# Patient Record
Sex: FEMALE | Race: BLACK OR AFRICAN AMERICAN | NOT HISPANIC OR LATINO | URBAN - METROPOLITAN AREA
[De-identification: names, ages, dates, MRNs, and addresses within clinical notes are randomized per-mention and may not be internally consistent; named-entity substitution may affect disease eponyms.]

---

## 2017-06-23 ENCOUNTER — OUTPATIENT (OUTPATIENT)
Dept: OUTPATIENT SERVICES | Facility: HOSPITAL | Age: 62
LOS: 1 days | End: 2017-06-23
Payer: COMMERCIAL

## 2017-06-23 DIAGNOSIS — Z98.89 OTHER SPECIFIED POSTPROCEDURAL STATES: Chronic | ICD-10-CM

## 2017-06-23 PROCEDURE — G0202: CPT | Mod: 26

## 2017-06-23 PROCEDURE — 77067 SCR MAMMO BI INCL CAD: CPT

## 2017-06-26 ENCOUNTER — FORM ENCOUNTER (OUTPATIENT)
Age: 62
End: 2017-06-26

## 2017-06-27 ENCOUNTER — OUTPATIENT (OUTPATIENT)
Dept: OUTPATIENT SERVICES | Facility: HOSPITAL | Age: 62
LOS: 1 days | End: 2017-06-27
Payer: COMMERCIAL

## 2017-06-27 ENCOUNTER — APPOINTMENT (OUTPATIENT)
Dept: ORTHOPEDIC SURGERY | Facility: CLINIC | Age: 62
End: 2017-06-27

## 2017-06-27 DIAGNOSIS — Z98.89 OTHER SPECIFIED POSTPROCEDURAL STATES: Chronic | ICD-10-CM

## 2017-06-27 PROCEDURE — 73564 X-RAY EXAM KNEE 4 OR MORE: CPT

## 2017-06-27 PROCEDURE — 73564 X-RAY EXAM KNEE 4 OR MORE: CPT | Mod: 26,LT

## 2017-08-08 ENCOUNTER — RX RENEWAL (OUTPATIENT)
Age: 62
End: 2017-08-08

## 2018-06-21 ENCOUNTER — FORM ENCOUNTER (OUTPATIENT)
Age: 63
End: 2018-06-21

## 2018-06-22 ENCOUNTER — APPOINTMENT (OUTPATIENT)
Dept: ORTHOPEDIC SURGERY | Facility: CLINIC | Age: 63
End: 2018-06-22
Payer: COMMERCIAL

## 2018-06-22 ENCOUNTER — OUTPATIENT (OUTPATIENT)
Dept: OUTPATIENT SERVICES | Facility: HOSPITAL | Age: 63
LOS: 1 days | End: 2018-06-22
Payer: COMMERCIAL

## 2018-06-22 VITALS — BODY MASS INDEX: 36.65 KG/M2 | WEIGHT: 220 LBS | HEIGHT: 65 IN

## 2018-06-22 DIAGNOSIS — S80.02XA CONTUSION OF LEFT KNEE, INITIAL ENCOUNTER: ICD-10-CM

## 2018-06-22 DIAGNOSIS — M17.12 UNILATERAL PRIMARY OSTEOARTHRITIS, LEFT KNEE: ICD-10-CM

## 2018-06-22 DIAGNOSIS — Z98.89 OTHER SPECIFIED POSTPROCEDURAL STATES: Chronic | ICD-10-CM

## 2018-06-22 DIAGNOSIS — S80.12XA CONTUSION OF LEFT KNEE, INITIAL ENCOUNTER: ICD-10-CM

## 2018-06-22 PROCEDURE — 20610 DRAIN/INJ JOINT/BURSA W/O US: CPT | Mod: LT

## 2018-06-22 PROCEDURE — 72020 X-RAY EXAM OF SPINE 1 VIEW: CPT | Mod: 26

## 2018-06-22 PROCEDURE — 73564 X-RAY EXAM KNEE 4 OR MORE: CPT | Mod: 26,LT

## 2018-06-22 PROCEDURE — 73564 X-RAY EXAM KNEE 4 OR MORE: CPT

## 2018-06-22 PROCEDURE — 73521 X-RAY EXAM HIPS BI 2 VIEWS: CPT

## 2018-06-22 PROCEDURE — 99214 OFFICE O/P EST MOD 30 MIN: CPT | Mod: 25

## 2018-06-22 PROCEDURE — 72020 X-RAY EXAM OF SPINE 1 VIEW: CPT

## 2018-06-22 PROCEDURE — 73521 X-RAY EXAM HIPS BI 2 VIEWS: CPT | Mod: 26

## 2018-06-25 ENCOUNTER — APPOINTMENT (OUTPATIENT)
Dept: MAMMOGRAPHY | Facility: HOSPITAL | Age: 63
End: 2018-06-25
Payer: COMMERCIAL

## 2018-06-25 ENCOUNTER — OUTPATIENT (OUTPATIENT)
Dept: OUTPATIENT SERVICES | Facility: HOSPITAL | Age: 63
LOS: 1 days | End: 2018-06-25
Payer: COMMERCIAL

## 2018-06-25 DIAGNOSIS — Z98.89 OTHER SPECIFIED POSTPROCEDURAL STATES: Chronic | ICD-10-CM

## 2018-06-25 PROCEDURE — 77063 BREAST TOMOSYNTHESIS BI: CPT | Mod: 26

## 2018-06-25 PROCEDURE — 77067 SCR MAMMO BI INCL CAD: CPT

## 2018-06-25 PROCEDURE — 77063 BREAST TOMOSYNTHESIS BI: CPT

## 2018-06-25 PROCEDURE — 77067 SCR MAMMO BI INCL CAD: CPT | Mod: 26

## 2018-07-10 ENCOUNTER — TRANSCRIPTION ENCOUNTER (OUTPATIENT)
Age: 63
End: 2018-07-10

## 2018-07-27 ENCOUNTER — APPOINTMENT (OUTPATIENT)
Dept: INTERNAL MEDICINE | Facility: CLINIC | Age: 63
End: 2018-07-27
Payer: COMMERCIAL

## 2018-07-27 VITALS — BODY MASS INDEX: 37.11 KG/M2 | WEIGHT: 223 LBS

## 2018-07-27 PROCEDURE — 97802 MEDICAL NUTRITION INDIV IN: CPT

## 2018-08-20 ENCOUNTER — APPOINTMENT (OUTPATIENT)
Dept: INTERNAL MEDICINE | Facility: CLINIC | Age: 63
End: 2018-08-20

## 2018-09-07 ENCOUNTER — APPOINTMENT (OUTPATIENT)
Dept: INTERNAL MEDICINE | Facility: CLINIC | Age: 63
End: 2018-09-07
Payer: COMMERCIAL

## 2018-09-07 VITALS — BODY MASS INDEX: 37.11 KG/M2 | WEIGHT: 223 LBS

## 2018-09-07 PROCEDURE — 97803 MED NUTRITION INDIV SUBSEQ: CPT

## 2018-09-24 ENCOUNTER — OTHER (OUTPATIENT)
Age: 63
End: 2018-09-24

## 2018-09-25 ENCOUNTER — FORM ENCOUNTER (OUTPATIENT)
Age: 63
End: 2018-09-25

## 2018-09-26 ENCOUNTER — OUTPATIENT (OUTPATIENT)
Dept: OUTPATIENT SERVICES | Facility: HOSPITAL | Age: 63
LOS: 1 days | End: 2018-09-26
Payer: COMMERCIAL

## 2018-09-26 ENCOUNTER — APPOINTMENT (OUTPATIENT)
Dept: ORTHOPEDIC SURGERY | Facility: CLINIC | Age: 63
End: 2018-09-26
Payer: COMMERCIAL

## 2018-09-26 VITALS
HEIGHT: 65 IN | DIASTOLIC BLOOD PRESSURE: 80 MMHG | BODY MASS INDEX: 36.65 KG/M2 | WEIGHT: 220 LBS | SYSTOLIC BLOOD PRESSURE: 120 MMHG

## 2018-09-26 DIAGNOSIS — M51.36 OTHER INTERVERTEBRAL DISC DEGENERATION, LUMBAR REGION: ICD-10-CM

## 2018-09-26 DIAGNOSIS — Z98.89 OTHER SPECIFIED POSTPROCEDURAL STATES: Chronic | ICD-10-CM

## 2018-09-26 PROCEDURE — 72084 X-RAY EXAM ENTIRE SPI 6/> VW: CPT | Mod: 26

## 2018-09-26 PROCEDURE — 72082 X-RAY EXAM ENTIRE SPI 2/3 VW: CPT

## 2018-09-26 PROCEDURE — 72110 X-RAY EXAM L-2 SPINE 4/>VWS: CPT

## 2018-09-26 PROCEDURE — 72110 X-RAY EXAM L-2 SPINE 4/>VWS: CPT | Mod: 26,59

## 2018-09-26 PROCEDURE — 99215 OFFICE O/P EST HI 40 MIN: CPT

## 2018-09-26 RX ORDER — HYDROCHLOROTHIAZIDE 25 MG/1
25 TABLET ORAL
Qty: 90 | Refills: 0 | Status: DISCONTINUED | COMMUNITY
Start: 2018-03-11 | End: 2018-09-26

## 2018-09-26 RX ORDER — MELOXICAM 15 MG/1
15 TABLET ORAL
Qty: 30 | Refills: 1 | Status: DISCONTINUED | COMMUNITY
Start: 2017-06-27 | End: 2018-09-26

## 2018-10-07 ENCOUNTER — FORM ENCOUNTER (OUTPATIENT)
Age: 63
End: 2018-10-07

## 2018-10-08 ENCOUNTER — APPOINTMENT (OUTPATIENT)
Dept: MRI IMAGING | Facility: HOSPITAL | Age: 63
End: 2018-10-08
Payer: COMMERCIAL

## 2018-10-08 ENCOUNTER — OUTPATIENT (OUTPATIENT)
Dept: OUTPATIENT SERVICES | Facility: HOSPITAL | Age: 63
LOS: 1 days | End: 2018-10-08
Payer: COMMERCIAL

## 2018-10-08 DIAGNOSIS — Z98.89 OTHER SPECIFIED POSTPROCEDURAL STATES: Chronic | ICD-10-CM

## 2018-10-08 PROCEDURE — 72148 MRI LUMBAR SPINE W/O DYE: CPT

## 2018-10-08 PROCEDURE — 72148 MRI LUMBAR SPINE W/O DYE: CPT | Mod: 26

## 2018-10-12 ENCOUNTER — APPOINTMENT (OUTPATIENT)
Dept: ORTHOPEDIC SURGERY | Facility: CLINIC | Age: 63
End: 2018-10-12
Payer: COMMERCIAL

## 2018-10-12 PROCEDURE — 20610 DRAIN/INJ JOINT/BURSA W/O US: CPT | Mod: LT

## 2018-10-12 RX ORDER — CHROMIUM 200 MCG
TABLET ORAL
Refills: 0 | Status: DISCONTINUED | COMMUNITY
End: 2018-10-12

## 2018-10-12 RX ADMIN — Medication 1 MG/2ML: at 00:00

## 2018-10-19 ENCOUNTER — APPOINTMENT (OUTPATIENT)
Dept: ORTHOPEDIC SURGERY | Facility: CLINIC | Age: 63
End: 2018-10-19
Payer: COMMERCIAL

## 2018-10-19 PROCEDURE — 20610 DRAIN/INJ JOINT/BURSA W/O US: CPT | Mod: RT

## 2018-10-19 RX ADMIN — Medication 1 MG/2ML: at 00:00

## 2018-10-26 ENCOUNTER — APPOINTMENT (OUTPATIENT)
Dept: ORTHOPEDIC SURGERY | Facility: CLINIC | Age: 63
End: 2018-10-26
Payer: COMMERCIAL

## 2018-10-26 PROCEDURE — 20610 DRAIN/INJ JOINT/BURSA W/O US: CPT | Mod: LT

## 2018-10-26 RX ADMIN — Medication 1 MG/2ML: at 00:00

## 2018-10-29 ENCOUNTER — APPOINTMENT (OUTPATIENT)
Dept: ORTHOPEDIC SURGERY | Facility: HOSPITAL | Age: 63
End: 2018-10-29
Payer: COMMERCIAL

## 2018-10-29 PROCEDURE — 99214 OFFICE O/P EST MOD 30 MIN: CPT

## 2018-11-02 ENCOUNTER — APPOINTMENT (OUTPATIENT)
Dept: INTERNAL MEDICINE | Facility: CLINIC | Age: 63
End: 2018-11-02

## 2019-06-04 ENCOUNTER — TRANSCRIPTION ENCOUNTER (OUTPATIENT)
Age: 64
End: 2019-06-04

## 2019-06-19 ENCOUNTER — FORM ENCOUNTER (OUTPATIENT)
Age: 64
End: 2019-06-19

## 2019-06-20 ENCOUNTER — APPOINTMENT (OUTPATIENT)
Dept: ORTHOPEDIC SURGERY | Facility: CLINIC | Age: 64
End: 2019-06-20
Payer: MEDICAID

## 2019-06-20 ENCOUNTER — OUTPATIENT (OUTPATIENT)
Dept: OUTPATIENT SERVICES | Facility: HOSPITAL | Age: 64
LOS: 1 days | End: 2019-06-20
Payer: COMMERCIAL

## 2019-06-20 VITALS — BODY MASS INDEX: 36.65 KG/M2 | HEIGHT: 65 IN | WEIGHT: 220 LBS

## 2019-06-20 DIAGNOSIS — Z98.89 OTHER SPECIFIED POSTPROCEDURAL STATES: Chronic | ICD-10-CM

## 2019-06-20 PROCEDURE — 99213 OFFICE O/P EST LOW 20 MIN: CPT | Mod: 25

## 2019-06-20 PROCEDURE — 73564 X-RAY EXAM KNEE 4 OR MORE: CPT

## 2019-06-20 PROCEDURE — 73564 X-RAY EXAM KNEE 4 OR MORE: CPT | Mod: 26,50

## 2019-06-20 PROCEDURE — 20610 DRAIN/INJ JOINT/BURSA W/O US: CPT | Mod: RT

## 2019-06-20 RX ORDER — DEXAMETHASONE SODIUM PHOSPHATE 4 MG/ML
120 INJECTION, SOLUTION INTRA-ARTICULAR; INTRALESIONAL; INTRAMUSCULAR; INTRAVENOUS; SOFT TISSUE
Refills: 0 | Status: COMPLETED | OUTPATIENT
Start: 2019-06-20

## 2019-06-20 RX ADMIN — DEXAMETHASONE SODIUM PHOSPHATE 1 MG/30ML: 4 INJECTION, SOLUTION INTRA-ARTICULAR; INTRALESIONAL; INTRAMUSCULAR; INTRAVENOUS; SOFT TISSUE at 00:00

## 2019-06-20 NOTE — PHYSICAL EXAM
[de-identified] : Constitutional: Well appearing.  No acute distress.\par Mental Status: Alert & oriented to person, place and time. Normal affect.\par Pulmonary: No respiratory distress. Normal chest excursion.\par Abdomen: Soft and not distended.\par Gait: Slight Antalgic.\par Ambulatory assist devices: None.\par \par Cervical spine: Skin intact. No visible deformity.  Painless active ROM without evident restriction.\par Bilateral upper extremities: Skin intact. No deformity. Painless active ROM without evident restriction.\par Thoracolumbar spine: No deformity. No tenderness. No radicular pain on passive straight leg raise bilaterally.\par Pelvis: No pelvic obliquity. No tenderness.\par Leg lengths: Equal.\par \par Bilateral Hips: Well-healed surgical scars. No swelling or deformity. No focal tenderness. Painless and unrestricted range of motion.  No crepitation. Hip flexor and abductor power 5/5.\par \par Right Knee:\par Skin intact.\par No surgical scars.\par No erythema or ecchymosis.\par Moderate effusion.\par No deformity.\par Medial > lateral tenderness.\par Mildly painful ROM from full extension to 115 degrees of flexion\par Central patellar tracking.\par Pain with patellar grind.\par + crepitation.\par No instability.\par Quadriceps power 5/5. \par \par Left Knee:\par Skin intact.\par No surgical scars.\par No erythema or ecchymosis.\par Mild effusion.\par No deformity.\par Medial and lateral joint line tenderness.\par ROM from full extension to 120 degrees of flexion, pain with terminal flexion.\par Central patellar tracking.\par Pain with patellar grind.\par + crepitation.\par No instability.\par Quadriceps power 5/5. \par  \par Neurological: Intact distal crude touch sensation. Normal distal motor power. Symmetric knee jerk and ankle jerk reflexes bilaterally.\par Cardiovascular: Palpable dorsalis pedis and posterior tibialis pulses. Brisk capillary refill. No peripheral edema.\par Lymphatics: No peripheral adenopathy appreciated. \par  [de-identified] : X-rays taken today demonstrate bilateral knees with severe joint space narrowing with subchondral sclerosis and osteophyte formation, right worse than left dyspnea

## 2019-06-20 NOTE — HISTORY OF PRESENT ILLNESS
[de-identified] : Patient presents for follow up of bilateral knee pain. States last HA injections provided about 4 months of relief and she would like repeat injections. Her pain is worse when walking long distances and going up and downstairs. She is taking Advil with mild relief and finds this to be more effective than Meloxicam.

## 2019-06-20 NOTE — PROCEDURE
[Injection] : Injection [Bilateral] : of bilateral [Knee Joint] : knee joint [Osteoarthritis] : Osteoarthritis [Joint Pain] : Joint pain [Patient] : patient [Risk] : risk [Benefits] : benefits [Alternatives] : alternatives [Bleeding] : bleeding [Allergic Reaction] : allergic reaction [Infection] : infection [Verbal Consent Obtained] : verbal consent was obtained prior to the procedure [Alcohol] : Alcohol [Betadine] : Betadine [Ethyl Chloride Spray] : ethyl chloride spray was used as a topical anesthetic [Lateral] : lateral [20] : a 20-gauge [1.5  inch] : 1.5 inch needle [1% Lidocaine___(mL)] : [unfilled] mL of 1% Lidocaine [Dexameth. 4mg/mL___(mL)] : [unfilled] ~U mL of 4 mg/mL dexamethasone [Tolerated Well] : The patient tolerated the procedure well [Bandage Applied] : a bandage [No Strenuous Activity___day(s)] : avoid strenuous activity for [unfilled] day(s) [None] : none [PRN] : as needed

## 2019-06-20 NOTE — ADDENDUM
[FreeTextEntry1] : The patient’s case was personally discussed with Dr. Raygoza who was in agreement with the assessment and plan\par \par

## 2019-06-20 NOTE — DISCUSSION/SUMMARY
[de-identified] : Diagnosis, prognosis and treatment options discussed. Conservative management including activity modification, therapeutic exercise with PT, topical and oral antiinflammatories, steroid and HA injections discussed. Patient understands the nature and progression of arthritis and that they will likely require knee replacement in the future. Will request HA authorization for HA. Patient was given bilateral steroid injections today for pain relief while awaiting HA approval. Follow up for injections.

## 2019-07-02 ENCOUNTER — APPOINTMENT (OUTPATIENT)
Dept: MAMMOGRAPHY | Facility: HOSPITAL | Age: 64
End: 2019-07-02
Payer: MEDICAID

## 2019-07-17 ENCOUNTER — OUTPATIENT (OUTPATIENT)
Dept: OUTPATIENT SERVICES | Facility: HOSPITAL | Age: 64
LOS: 1 days | End: 2019-07-17
Payer: COMMERCIAL

## 2019-07-17 ENCOUNTER — APPOINTMENT (OUTPATIENT)
Dept: MAMMOGRAPHY | Facility: HOSPITAL | Age: 64
End: 2019-07-17
Payer: MEDICAID

## 2019-07-17 DIAGNOSIS — Z98.89 OTHER SPECIFIED POSTPROCEDURAL STATES: Chronic | ICD-10-CM

## 2019-07-17 PROCEDURE — 77067 SCR MAMMO BI INCL CAD: CPT | Mod: 26

## 2019-07-17 PROCEDURE — 77063 BREAST TOMOSYNTHESIS BI: CPT | Mod: 26

## 2019-07-17 PROCEDURE — 77067 SCR MAMMO BI INCL CAD: CPT

## 2019-07-17 PROCEDURE — 77063 BREAST TOMOSYNTHESIS BI: CPT

## 2019-07-31 ENCOUNTER — FORM ENCOUNTER (OUTPATIENT)
Age: 64
End: 2019-07-31

## 2019-08-01 ENCOUNTER — APPOINTMENT (OUTPATIENT)
Dept: ORTHOPEDIC SURGERY | Facility: CLINIC | Age: 64
End: 2019-08-01
Payer: MEDICAID

## 2019-08-01 ENCOUNTER — OUTPATIENT (OUTPATIENT)
Dept: OUTPATIENT SERVICES | Facility: HOSPITAL | Age: 64
LOS: 1 days | End: 2019-08-01
Payer: COMMERCIAL

## 2019-08-01 VITALS — WEIGHT: 220 LBS | HEIGHT: 65 IN | BODY MASS INDEX: 36.65 KG/M2

## 2019-08-01 DIAGNOSIS — Z98.89 OTHER SPECIFIED POSTPROCEDURAL STATES: Chronic | ICD-10-CM

## 2019-08-01 PROCEDURE — 99214 OFFICE O/P EST MOD 30 MIN: CPT

## 2019-08-01 PROCEDURE — 73521 X-RAY EXAM HIPS BI 2 VIEWS: CPT | Mod: 26

## 2019-08-01 PROCEDURE — 73521 X-RAY EXAM HIPS BI 2 VIEWS: CPT

## 2019-08-01 NOTE — DISCUSSION/SUMMARY
[de-identified] : Ms. VALERO  has progressively severe hip pain and disability secondary to DJD and has failed extensive conservative care including activity modification, analgesic medications and therapeutic exercise. The patient was indicated for left total hip replacement.\par \par We discussed the details of the procedure, the expected recovery period, and the expected outcome. Bearing surface and fixation options were discussed, along with surgical approaches. For this patient I recommended a posterior approach to ensure adequate surgical exposure.\par \par We discussed the likelihood of satisfaction after complete recovery, and the potential causes of dissatisfaction. Specific risks of total hip replacement were discussed in detail. We discussed the risk of surgical site complications including but not limited to: surgical site infection, wound healing complications, bone fracture, prosthetic hip dislocation, neurovascular injury, hemorrhage, limb length inequality, persistent pain and need for reoperation. We discussed surgical blood loss and the possible need for blood transfusion. We discussed the risk of perioperative medical complications, including but not limited to catheter-associated urinary tract infection, venous thromboembolism and other cardiopulmonary complications. We discussed anesthetic options and the risk of anesthesia-related complications. We discussed the durability of prosthetic hips and limitations related to wear, osteolysis and loosening. The patient was given a copy of my preoperative packet with additional information about the procedure.\par \par I advised the patient to attempt to be the most lean, limber, strong and coordinated version of herself prior to the operation. I encouraged her to lose weight and referred her to a nutritionist program.\par \par The patient gave informed consent for the surgical procedure and was instructed to speak to my surgical coordinator to arrange the logistics of surgical scheduling, presurgical testing, and medical optimization and clearance.

## 2019-08-01 NOTE — PHYSICAL EXAM
[de-identified] : Well appearing. NAD. Alert and oriented x 3. No respiratory distress.\par Bilateral upper extremities: Skin intact. No deformity. Painless active ROM without evident restriction.\par Cervical, thoracic and lumbar spine: No visible deformity. Painless active ROM without evident restriction. No radicular pain on passive straight leg raise bilaterally.\par \par Pelvis: No pelvic obliquity. No tenderness.\par Leg lengths: Similar. Left ~2 mm shorter than right.\par \par Gait: Left coxalgic.\par Ambulatory assist devices: None.\par \par Right Hip:\par Skin intact. (+) Well healed posterior surgical scar. No erythema. No ecchymosis. No swelling. No deformity. No focal tenderness.\par Painless ROM from full extension to 115 degrees of flexion. 5 degrees of internal rotation. 55 degrees of external rotation. 60 degrees of abduction. 10 degrees of adduction.\par JOSE painless. Impingement (FADIR) painless. Stinchfield painless.\par No crepitation. No instability. Abductor power 5/5. \par \par Left Hip:\par Skin intact. No surgical scars. No erythema. No ecchymosis. No swelling. No deformity. No focal tenderness.\par Painful ROM from full extension to 90 degrees of flexion. 15 degrees of obligate external rotation. 40 degrees of external rotation. 25 degrees of abduction. 0 degrees of adduction.\par JOSE painful. Impingement (FADIR) painful. Stinchfield painful.\par No crepitation. No instability.\par Abductor power 5/5. Flexor power 4-/5.\par \par Bilateral Knees: Skin intact. No surgical scars. No erythema or ecchymosis. No swelling or effusion. No deformity. No focal tenderness. Central patellar tracking. (+) crepitation. No instability. ROM from <5 degree flexion contracture to 115 degrees of flexion.\par \par Neurological: Intact distal crude touch sensation. Normal distal motor power. \par Cardiovascular: Lower extremities warm and well perfused. No peripheral edema. [de-identified] : X-ray imaging of the AP pelvis and bilateral hips done here today demonstrates right hip with well-fixed right THR in overall good alignment, severe osteoarthritis of left hip, bone on bone with subchondral sclerosis, cysts and osteophyte formation\par

## 2019-08-01 NOTE — HISTORY OF PRESENT ILLNESS
[de-identified] : 63 year old F presents today for follow up evaluation of left hip pain. She reports moderate, daily left hip pain localized to the side of the hip and the buttock that occurs upon rising in the morning. She also reports some bilateral knee pain but the hip is more severe and disabling. Patient can walk less than 5 blocks with a slight limp. She has difficulty using stairs. \par Ms. Lai is s/p right THR 10/25/16 and is doing well overall.

## 2019-08-01 NOTE — END OF VISIT
[>50% of Time Spent on Counseling for ____] : Greater than 50% of the encounter time was spent on counseling for [unfilled] [FreeTextEntry3] : All medical record entries made by Michelle Espinoza acting as a scribe for the performing provider (Al Raygoza MD and/or KRISTEN De La Torre) on 08/01/2019. All entries were dictated to me by the performing medical provider. In signing this record, the medical provider affirms that they have personally performed the history, physical exam, assessment and plan and have also directed, reviewed and agreed to the documentation in the chart. [Time Spent: ___ minutes] : I have spent [unfilled] minutes of face to face time with the patient

## 2019-08-14 ENCOUNTER — OTHER (OUTPATIENT)
Age: 64
End: 2019-08-14

## 2019-08-19 ENCOUNTER — FORM ENCOUNTER (OUTPATIENT)
Age: 64
End: 2019-08-19

## 2019-08-20 ENCOUNTER — OUTPATIENT (OUTPATIENT)
Dept: OUTPATIENT SERVICES | Facility: HOSPITAL | Age: 64
LOS: 1 days | End: 2019-08-20
Payer: COMMERCIAL

## 2019-08-20 ENCOUNTER — APPOINTMENT (OUTPATIENT)
Dept: ORTHOPEDIC SURGERY | Facility: CLINIC | Age: 64
End: 2019-08-20
Payer: MEDICAID

## 2019-08-20 DIAGNOSIS — Z98.89 OTHER SPECIFIED POSTPROCEDURAL STATES: Chronic | ICD-10-CM

## 2019-08-20 PROCEDURE — 99215 OFFICE O/P EST HI 40 MIN: CPT

## 2019-08-20 PROCEDURE — 72110 X-RAY EXAM L-2 SPINE 4/>VWS: CPT

## 2019-08-20 PROCEDURE — 72110 X-RAY EXAM L-2 SPINE 4/>VWS: CPT | Mod: 26

## 2019-08-25 ENCOUNTER — FORM ENCOUNTER (OUTPATIENT)
Age: 64
End: 2019-08-25

## 2019-08-26 ENCOUNTER — APPOINTMENT (OUTPATIENT)
Dept: CT IMAGING | Facility: HOSPITAL | Age: 64
End: 2019-08-26
Payer: MEDICAID

## 2019-08-26 ENCOUNTER — OUTPATIENT (OUTPATIENT)
Dept: OUTPATIENT SERVICES | Facility: HOSPITAL | Age: 64
LOS: 1 days | End: 2019-08-26
Payer: MEDICAID

## 2019-08-26 ENCOUNTER — OUTPATIENT (OUTPATIENT)
Dept: OUTPATIENT SERVICES | Facility: HOSPITAL | Age: 64
LOS: 1 days | End: 2019-08-26

## 2019-08-26 DIAGNOSIS — Z98.89 OTHER SPECIFIED POSTPROCEDURAL STATES: Chronic | ICD-10-CM

## 2019-08-26 DIAGNOSIS — Z01.818 ENCOUNTER FOR OTHER PREPROCEDURAL EXAMINATION: ICD-10-CM

## 2019-08-26 DIAGNOSIS — Z22.321 CARRIER OR SUSPECTED CARRIER OF METHICILLIN SUSCEPTIBLE STAPHYLOCOCCUS AUREUS: ICD-10-CM

## 2019-08-26 LAB
ANION GAP SERPL CALC-SCNC: 10 MMOL/L — SIGNIFICANT CHANGE UP (ref 5–17)
APPEARANCE UR: CLEAR — SIGNIFICANT CHANGE UP
APTT BLD: 37.8 SEC — HIGH (ref 27.5–36.3)
BILIRUB UR-MCNC: NEGATIVE — SIGNIFICANT CHANGE UP
BUN SERPL-MCNC: 15 MG/DL — SIGNIFICANT CHANGE UP (ref 7–23)
CALCIUM SERPL-MCNC: 10 MG/DL — SIGNIFICANT CHANGE UP (ref 8.4–10.5)
CHLORIDE SERPL-SCNC: 105 MMOL/L — SIGNIFICANT CHANGE UP (ref 96–108)
CO2 SERPL-SCNC: 27 MMOL/L — SIGNIFICANT CHANGE UP (ref 22–31)
COLOR SPEC: YELLOW — SIGNIFICANT CHANGE UP
CREAT SERPL-MCNC: 0.65 MG/DL — SIGNIFICANT CHANGE UP (ref 0.5–1.3)
DIFF PNL FLD: NEGATIVE — SIGNIFICANT CHANGE UP
GLUCOSE SERPL-MCNC: 86 MG/DL — SIGNIFICANT CHANGE UP (ref 70–99)
GLUCOSE UR QL: NEGATIVE — SIGNIFICANT CHANGE UP
HBA1C BLD-MCNC: 5.5 % — SIGNIFICANT CHANGE UP (ref 4–5.6)
HCT VFR BLD CALC: 40.3 % — SIGNIFICANT CHANGE UP (ref 34.5–45)
HGB BLD-MCNC: 12.7 G/DL — SIGNIFICANT CHANGE UP (ref 11.5–15.5)
INR BLD: 1.12 — SIGNIFICANT CHANGE UP (ref 0.88–1.16)
KETONES UR-MCNC: ABNORMAL MG/DL
LEUKOCYTE ESTERASE UR-ACNC: NEGATIVE — SIGNIFICANT CHANGE UP
MCHC RBC-ENTMCNC: 30.2 PG — SIGNIFICANT CHANGE UP (ref 27–34)
MCHC RBC-ENTMCNC: 31.5 GM/DL — LOW (ref 32–36)
MCV RBC AUTO: 95.7 FL — SIGNIFICANT CHANGE UP (ref 80–100)
MRSA PCR RESULT.: NEGATIVE — SIGNIFICANT CHANGE UP
NITRITE UR-MCNC: NEGATIVE — SIGNIFICANT CHANGE UP
NRBC # BLD: 0 /100 WBCS — SIGNIFICANT CHANGE UP (ref 0–0)
PH UR: 5.5 — SIGNIFICANT CHANGE UP (ref 5–8)
PLATELET # BLD AUTO: 244 K/UL — SIGNIFICANT CHANGE UP (ref 150–400)
POTASSIUM SERPL-MCNC: 4.2 MMOL/L — SIGNIFICANT CHANGE UP (ref 3.5–5.3)
POTASSIUM SERPL-SCNC: 4.2 MMOL/L — SIGNIFICANT CHANGE UP (ref 3.5–5.3)
PROT UR-MCNC: NEGATIVE MG/DL — SIGNIFICANT CHANGE UP
PROTHROM AB SERPL-ACNC: 12.7 SEC — SIGNIFICANT CHANGE UP (ref 10–12.9)
RBC # BLD: 4.21 M/UL — SIGNIFICANT CHANGE UP (ref 3.8–5.2)
RBC # FLD: 14.4 % — SIGNIFICANT CHANGE UP (ref 10.3–14.5)
S AUREUS DNA NOSE QL NAA+PROBE: NEGATIVE — SIGNIFICANT CHANGE UP
SODIUM SERPL-SCNC: 142 MMOL/L — SIGNIFICANT CHANGE UP (ref 135–145)
SP GR SPEC: >=1.03 — SIGNIFICANT CHANGE UP (ref 1–1.03)
UROBILINOGEN FLD QL: 0.2 E.U./DL — SIGNIFICANT CHANGE UP
WBC # BLD: 6.47 K/UL — SIGNIFICANT CHANGE UP (ref 3.8–10.5)
WBC # FLD AUTO: 6.47 K/UL — SIGNIFICANT CHANGE UP (ref 3.8–10.5)

## 2019-08-26 PROCEDURE — 71046 X-RAY EXAM CHEST 2 VIEWS: CPT | Mod: 26

## 2019-08-26 PROCEDURE — 71046 X-RAY EXAM CHEST 2 VIEWS: CPT

## 2019-08-26 PROCEDURE — 85610 PROTHROMBIN TIME: CPT

## 2019-08-26 PROCEDURE — 73700 CT LOWER EXTREMITY W/O DYE: CPT | Mod: 26,LT

## 2019-08-26 PROCEDURE — 87086 URINE CULTURE/COLONY COUNT: CPT

## 2019-08-26 PROCEDURE — 93005 ELECTROCARDIOGRAM TRACING: CPT

## 2019-08-26 PROCEDURE — 80048 BASIC METABOLIC PNL TOTAL CA: CPT

## 2019-08-26 PROCEDURE — 85027 COMPLETE CBC AUTOMATED: CPT

## 2019-08-26 PROCEDURE — 73700 CT LOWER EXTREMITY W/O DYE: CPT

## 2019-08-26 PROCEDURE — 85730 THROMBOPLASTIN TIME PARTIAL: CPT

## 2019-08-26 PROCEDURE — 93010 ELECTROCARDIOGRAM REPORT: CPT

## 2019-08-26 PROCEDURE — 81003 URINALYSIS AUTO W/O SCOPE: CPT

## 2019-08-26 PROCEDURE — 83036 HEMOGLOBIN GLYCOSYLATED A1C: CPT

## 2019-08-28 LAB
CULTURE RESULTS: SIGNIFICANT CHANGE UP
SPECIMEN SOURCE: SIGNIFICANT CHANGE UP

## 2019-08-29 LAB
ANABASINE UR-MCNC: <2 NG/ML — SIGNIFICANT CHANGE UP
COTININE UR-MCNC: <5 NG/ML — SIGNIFICANT CHANGE UP
NICOTINE UR-MCNC: <5 NG/ML — SIGNIFICANT CHANGE UP
NORNICOTINE UR-MCNC: <2 NG/ML — SIGNIFICANT CHANGE UP

## 2019-09-01 NOTE — PHYSICAL EXAM
[de-identified] : Spine: Spine: General: patient is well developed, well nourished, in no acute \par distress, alert and oriented x 3. \par \par Mood and affect: normal\par \par Respiratory: no respiratory distress noted\par \par Skin: no scars over spine, skin intact, no erythema, increased warmth\par \par Alignment:The spine is well compensated in the coronal and sagittal plane. There is no asymmetry on the perry forward bend test\par \par Gait: The patient is able to toe walk and heel walk without difficulty. The patient is able to tandem gait without difficulty.\par \par Palpation: no tenderness to palpation spine or paraspinal region\par \par Range of motion: Lumbar spine ROM is restricted\par \par Neurologic Exam:\par Motor: Manual Muscle testing in the lower extremities is 5 out of 5 in all muscle groups. There is no evidence of muscular atrophy in the lower extremities. Sensory: Sensation to light touch is grossly intact in the lower extremities\par \par Reflexes: DTR are present and symmetric throughout, negative lugo bilaterally, negative inverted radial reflex bilaterally, no clonus, plantar responses are flexor\par \par Hip Exam: + mild pain with IR of left hip, No pain with internal or external rotation of right hip\par \par Special tests: Straight leg raise test negative. Cross straight leg test negative. JOSE test negative\par \par Vascular: Examination of the peripheral vascular system demonstrates no evidence of congestion or edema. no evidence of lymphedema bilateral lower extremities, pulses are present and symmetric in both lower extremities.\par   [de-identified] : XR thoracolumbar 9/26/18: 9mm L4/5 anterolisthesis without instability on flex/ext; multilevel moderate degenerative changes; moderate osteoarthritis left hip; no acute fractures. \par \par MRI lumbar 10/8/18: L4/5 moderate/severe central canal stenosis, moderate/severe bilateral foraminal stenosis, L4 on L5 anterolisthesis; multilevel degenerative changes with mild central canal stenosis, mild/moderate bilateral foraminal stenosis

## 2019-09-01 NOTE — HISTORY OF PRESENT ILLNESS
[de-identified] : Follow Up 8/20/19: Patient continues to have low back and buttock pain. Denies new neurologic symptoms. Has seen Dr. Goetz for pain management. Has left THR scheduled with Dr. Raygoza 9/23/19.\par \par Follow Up 10/29/18: Patient continues to have right sided low back pain that radiates to her right buttock. Pain is mild/moderate in severity and unchanged since last visit 1 month ago. Denies new neurologic symptoms. Denies bowel/bladder symptoms. Here to discuss MRI lumbar. \par \par Initial 9/26/18: Ms. VALERO is a very pleasant 62 year old female who complains of right sided low back pain for 1 year, atraumatic. On initial presentation symptoms were as follows: Patient described the pain as intermittent. Patient rated the pain as 2/10 today, average 2/10 this week, 5/10 at its worst. The pain localized to the right side of the lower back and radiates to her right buttock. Patient denies extremity numbness and paresthesias. The pain is relieved by lying down and sitting. The pain is worsened by activity. Patient currently taking ibuprofen and tylenol as needed, with moderate temporary relief. The patient has not had physical therapy for her low back or steroid injections. Patient had right JOVI with Dr. Raygoza 10/2016 with complete relief of right hip pain at at that time. Low back and buttock pain onset about 1 year after JOVI.\par \par The patient reports no loss of hand dexterity.\par The patient states there no loss of balance when walking.\par There no sensory loss in the arms or legs\par The patent no difficulty with urination.\par \par The patient no history of previous spine surgery.\par The patient no previous spine consultation.\par \par Pain:\par Back 50 Right Leg 50 Left Leg 0\par \par The patient has no history of unexpected weight loss, no history of active cancer, no history bladder or bowel dysfunction, no night pain, no fevers or chills.\par \par The past medical history, surgical history, family history, allergies, medications, review of systems, family history and social history were reviewed and non contributory.

## 2019-09-01 NOTE — DISCUSSION/SUMMARY
[de-identified] : Discussed the results of the patient's history, physical exam, and imaging. Ms. Lai continues to suffer from low back and right buttock pain. She is planning a left THR with Dr. Raygoza 9/23/19. Neurologically intact on exam. MRI lumbar shows L4/5 severe central canal stenosis and moderate/severe bilateral foraminal stenosis. She also has an unstable 9mm L4 on L5 anterolisthesis. This likely explains her low back and buttock pain. Discussed this may require surgical intervention in the future. I am recommending an updated XR lumbar 4 view and MRI lumbar spine without contrast. She will complete MRI lumbar and then follow up with Dr. Goetz with new imaging for consideration of LISETTE. Can proceed with left THR, consult with Dr. Raygoza if scheduling LISETTE around the time of scheduled THR.  The patient will follow up with me 12/2019, sooner if there is an issue.  All questions were answered.\par \par \par \par \par

## 2019-09-20 VITALS
WEIGHT: 225.09 LBS | TEMPERATURE: 97 F | SYSTOLIC BLOOD PRESSURE: 99 MMHG | HEART RATE: 98 BPM | OXYGEN SATURATION: 99 % | RESPIRATION RATE: 18 BRPM | DIASTOLIC BLOOD PRESSURE: 65 MMHG | HEIGHT: 64 IN

## 2019-09-20 NOTE — PRE-OP CHECKLIST - BOWEL PREP
[address] [id number]   [facility] [address]     DISCHARGE SUMMARY     PATIENT: Margarito Pemberton     MRN: [id number]   ADMIT DATE: 2017   [facility]   DISCHARGE DATE:      ATTENDING: Ashley Stout MD   DICTATING: RYNE Stein         ADMISSION DIAGNOSIS: OSTEOARTHRITIS LEFT KNEE  Rheumatoid arthritis involving left knee ([facility] [address] [address]    DISCHARGE DIAGNOSIS: Status post LEFT TOTAL KNEE ARTHROPLASTY    HISTORY OF PRESENT ILLNESS: The patient is a 36y.o. year-old female   with ongoing left knee pain secondary to rheumatoid arthritis of her left knee. The patient's pain has persisted and progressed despite conservative treatments and therapies. The patient has at this time opted for surgical intervention. PAST MEDICAL HISTORY:   Past Medical History:   Diagnosis Date    Arthritis 2005    Rhematoid arthritis    Autoimmune disease ([facility] [address]     RA    Chronic pain     RA    Osteoarthritis of left knee 2017    Rheumatoid arthritis involving left knee ([facility] 2017       PAST SURGICAL HISTORY:   Past Surgical History:   Procedure Laterality Date    HX GYN      BTL       ALLERGIES: No Known Allergies     CURRENT MEDICATIONS:  A list of medications prior to the time of admission include:  Prior to Admission medications    Medication Sig Start Date End Date Taking? Authorizing Provider   acetaminophen (TYLENOL ARTHRITIS PAIN) 650 mg CR tablet Take 650 mg by mouth every six (6) hours as needed for Pain (low to moderate pain). Yes Historical Provider   aspirin (ASPIRIN) 325 mg tablet Take 1 Tab by mouth two (2) times a day for 21 days. 17 Yes RYNE Stein   meloxicam (MOBIC) 7.5 mg tablet Take 1 Tab by mouth two (2) times a day for 14 days. 17 Yes RYNE Stein   oxyCODONE-acetaminophen (PERCOCET) 5-325 mg per tablet Take 1-2 tablets by mouth every 4-6 hours as needed for pain.  17  Yes RYNE Stein   predniSONE (DELTASONE) 5 mg tablet Take 5 mg by mouth daily as needed. Yes Historical Provider   tofacitinib (XELJANZ XR) 11 mg Tb24 Take 11 mg by mouth nightly. Indications: RHEUMATOID ARTHRITIS   Yes Historical Provider   OTHER     Historical Provider       FAMILY HISTORY: History reviewed. No pertinent family history. SOCIAL HISTORY:   Social History     Social History    Marital status: SINGLE     Spouse name: N/A    Number of children: N/A    Years of education: N/A     Social History Main Topics    Smoking status: Never Smoker    Smokeless tobacco: Never Used    Alcohol use 0.6 oz/week     1 Shots of liquor per week    Drug use: No    Sexual activity: Not Asked     Other Topics Concern    None     Social History Narrative       REVIEW OF SYSTEMS: All review of systems are negative. PHYSICAL EXAMINATION: For a detailed physical exam, please refer to the patient's chart. HOSPITAL COURSE: The patient was taken to surgery the day of admission. she underwent a left total knee replacement. Operative course was benign. Estimated blood loss was approximately 150 cc. The patient was taken to the PACU in stable condition and was later taken to the floor in stable condition. During her hospital stay, the patient progressed well with physical therapy and occupational therapy, adherent to instructions. she had been cleared by physical therapy with stair training. she was placed on Aspirin for DVT prophylaxis. her pain has been well controlled with oral pain medications. her vitals have remained stable. she has also remained hemodynamically stable. The patient has been recommended for discharge home. DISCHARGE INSTRUCTIONS: The patient is to be discharged home. she is to continue on her prior medications per the medication reconciliation form, to which we will add:  1. Aspirin 325 mg; 1 tablet po bid x 21 days  2. Mobic 7.5 mg; 1 tablet po bid x 14 days  3.  Percocet 5/325 mg; 1-2 tablets p.o. every 4 to 6 hours p.r.n. for pain    The patient is to continue at home with home physical therapy 3 times a week to work on gait training, range of motion, strengthening, and weightbearing exercises as tolerated on the left lower extremity. The patient is to progress from a walker to a cane to complete total weightbearing as tolerable. The patient is to continue to keep her incision dry. The patient is to followup with Dr. Hussain Mendoza, Mary Jacques PA-C and/or Oliver Horn PA-C in the office approximately 10-14 days status post for x-rays and further evaluation.     Jovana Bower [id number], [id number]  07/07/17  7:19 AM n/a

## 2019-09-20 NOTE — PRE-OP CHECKLIST - 2.
ERAS- Gatorade done. Meds - Eemend, Celebrex and Tylenol given ERAS- Gatorade done. Meds - Emend, Celebrex and Tylenol given

## 2019-09-23 ENCOUNTER — INPATIENT (INPATIENT)
Facility: HOSPITAL | Age: 64
LOS: 2 days | Discharge: HOME HEALTH | DRG: 470 | End: 2019-09-26
Attending: ORTHOPAEDIC SURGERY | Admitting: ORTHOPAEDIC SURGERY
Payer: COMMERCIAL

## 2019-09-23 ENCOUNTER — APPOINTMENT (OUTPATIENT)
Dept: ORTHOPEDIC SURGERY | Facility: HOSPITAL | Age: 64
End: 2019-09-23

## 2019-09-23 ENCOUNTER — RESULT REVIEW (OUTPATIENT)
Age: 64
End: 2019-09-23

## 2019-09-23 DIAGNOSIS — M16.12 UNILATERAL PRIMARY OSTEOARTHRITIS, LEFT HIP: ICD-10-CM

## 2019-09-23 DIAGNOSIS — E78.5 HYPERLIPIDEMIA, UNSPECIFIED: ICD-10-CM

## 2019-09-23 DIAGNOSIS — Z98.89 OTHER SPECIFIED POSTPROCEDURAL STATES: Chronic | ICD-10-CM

## 2019-09-23 DIAGNOSIS — I10 ESSENTIAL (PRIMARY) HYPERTENSION: ICD-10-CM

## 2019-09-23 DIAGNOSIS — J45.909 UNSPECIFIED ASTHMA, UNCOMPLICATED: ICD-10-CM

## 2019-09-23 DIAGNOSIS — Z96.641 PRESENCE OF RIGHT ARTIFICIAL HIP JOINT: Chronic | ICD-10-CM

## 2019-09-23 LAB
APPEARANCE UR: CLEAR — SIGNIFICANT CHANGE UP
BACTERIA # UR AUTO: ABNORMAL /HPF
BILIRUB UR-MCNC: NEGATIVE — SIGNIFICANT CHANGE UP
COLOR SPEC: YELLOW — SIGNIFICANT CHANGE UP
DIFF PNL FLD: NEGATIVE — SIGNIFICANT CHANGE UP
EPI CELLS # UR: ABNORMAL /HPF (ref 0–5)
GLUCOSE UR QL: NEGATIVE — SIGNIFICANT CHANGE UP
KETONES UR-MCNC: NEGATIVE — SIGNIFICANT CHANGE UP
LEUKOCYTE ESTERASE UR-ACNC: ABNORMAL
NITRITE UR-MCNC: NEGATIVE — SIGNIFICANT CHANGE UP
PH UR: 8 — SIGNIFICANT CHANGE UP (ref 5–8)
PROT UR-MCNC: NEGATIVE MG/DL — SIGNIFICANT CHANGE UP
RBC CASTS # UR COMP ASSIST: < 5 /HPF — SIGNIFICANT CHANGE UP
SP GR SPEC: 1.01 — SIGNIFICANT CHANGE UP (ref 1–1.03)
UROBILINOGEN FLD QL: 0.2 E.U./DL — SIGNIFICANT CHANGE UP
WBC UR QL: ABNORMAL /HPF

## 2019-09-23 PROCEDURE — 20985 CPTR-ASST DIR MS PX: CPT

## 2019-09-23 PROCEDURE — 27130 TOTAL HIP ARTHROPLASTY: CPT | Mod: LT

## 2019-09-23 PROCEDURE — 72170 X-RAY EXAM OF PELVIS: CPT | Mod: 26

## 2019-09-23 RX ORDER — APREPITANT 80 MG/1
40 CAPSULE ORAL ONCE
Refills: 0 | Status: COMPLETED | OUTPATIENT
Start: 2019-09-23 | End: 2019-09-23

## 2019-09-23 RX ORDER — VANCOMYCIN HCL 1 G
1500 VIAL (EA) INTRAVENOUS ONCE
Refills: 0 | Status: COMPLETED | OUTPATIENT
Start: 2019-09-23 | End: 2019-09-23

## 2019-09-23 RX ORDER — SENNA PLUS 8.6 MG/1
2 TABLET ORAL AT BEDTIME
Refills: 0 | Status: DISCONTINUED | OUTPATIENT
Start: 2019-09-23 | End: 2019-09-26

## 2019-09-23 RX ORDER — CHLORHEXIDINE GLUCONATE 213 G/1000ML
1 SOLUTION TOPICAL ONCE
Refills: 0 | Status: COMPLETED | OUTPATIENT
Start: 2019-09-23 | End: 2019-09-23

## 2019-09-23 RX ORDER — OXYCODONE HYDROCHLORIDE 5 MG/1
10 TABLET ORAL EVERY 4 HOURS
Refills: 0 | Status: DISCONTINUED | OUTPATIENT
Start: 2019-09-23 | End: 2019-09-26

## 2019-09-23 RX ORDER — MORPHINE SULFATE 50 MG/1
4 CAPSULE, EXTENDED RELEASE ORAL
Refills: 0 | Status: DISCONTINUED | OUTPATIENT
Start: 2019-09-23 | End: 2019-09-26

## 2019-09-23 RX ORDER — PANTOPRAZOLE SODIUM 20 MG/1
40 TABLET, DELAYED RELEASE ORAL
Refills: 0 | Status: DISCONTINUED | OUTPATIENT
Start: 2019-09-23 | End: 2019-09-26

## 2019-09-23 RX ORDER — OXYCODONE HYDROCHLORIDE 5 MG/1
5 TABLET ORAL EVERY 4 HOURS
Refills: 0 | Status: DISCONTINUED | OUTPATIENT
Start: 2019-09-23 | End: 2019-09-26

## 2019-09-23 RX ORDER — SODIUM CHLORIDE 9 MG/ML
1000 INJECTION, SOLUTION INTRAVENOUS
Refills: 0 | Status: DISCONTINUED | OUTPATIENT
Start: 2019-09-23 | End: 2019-09-26

## 2019-09-23 RX ORDER — ACETAMINOPHEN 500 MG
1000 TABLET ORAL ONCE
Refills: 0 | Status: COMPLETED | OUTPATIENT
Start: 2019-09-23 | End: 2019-09-23

## 2019-09-23 RX ORDER — POLYETHYLENE GLYCOL 3350 17 G/17G
17 POWDER, FOR SOLUTION ORAL DAILY
Refills: 0 | Status: DISCONTINUED | OUTPATIENT
Start: 2019-09-23 | End: 2019-09-26

## 2019-09-23 RX ORDER — MORPHINE SULFATE 50 MG/1
4 CAPSULE, EXTENDED RELEASE ORAL EVERY 4 HOURS
Refills: 0 | Status: DISCONTINUED | OUTPATIENT
Start: 2019-09-23 | End: 2019-09-26

## 2019-09-23 RX ORDER — CELECOXIB 200 MG/1
200 CAPSULE ORAL
Refills: 0 | Status: DISCONTINUED | OUTPATIENT
Start: 2019-09-23 | End: 2019-09-23

## 2019-09-23 RX ORDER — CELECOXIB 200 MG/1
400 CAPSULE ORAL ONCE
Refills: 0 | Status: COMPLETED | OUTPATIENT
Start: 2019-09-23 | End: 2019-09-23

## 2019-09-23 RX ORDER — TRIAMTERENE/HYDROCHLOROTHIAZID 75 MG-50MG
1 TABLET ORAL DAILY
Refills: 0 | Status: DISCONTINUED | OUTPATIENT
Start: 2019-09-23 | End: 2019-09-26

## 2019-09-23 RX ORDER — ONDANSETRON 8 MG/1
4 TABLET, FILM COATED ORAL EVERY 6 HOURS
Refills: 0 | Status: DISCONTINUED | OUTPATIENT
Start: 2019-09-23 | End: 2019-09-26

## 2019-09-23 RX ORDER — KETOROLAC TROMETHAMINE 30 MG/ML
15 SYRINGE (ML) INJECTION EVERY 6 HOURS
Refills: 0 | Status: DISCONTINUED | OUTPATIENT
Start: 2019-09-23 | End: 2019-09-25

## 2019-09-23 RX ORDER — CELECOXIB 200 MG/1
200 CAPSULE ORAL
Refills: 0 | Status: DISCONTINUED | OUTPATIENT
Start: 2019-09-23 | End: 2019-09-26

## 2019-09-23 RX ORDER — DOCUSATE SODIUM 100 MG
100 CAPSULE ORAL THREE TIMES A DAY
Refills: 0 | Status: DISCONTINUED | OUTPATIENT
Start: 2019-09-23 | End: 2019-09-26

## 2019-09-23 RX ORDER — ASPIRIN/CALCIUM CARB/MAGNESIUM 324 MG
325 TABLET ORAL
Refills: 0 | Status: DISCONTINUED | OUTPATIENT
Start: 2019-09-23 | End: 2019-09-26

## 2019-09-23 RX ORDER — ACETAMINOPHEN 500 MG
975 TABLET ORAL EVERY 8 HOURS
Refills: 0 | Status: DISCONTINUED | OUTPATIENT
Start: 2019-09-23 | End: 2019-09-26

## 2019-09-23 RX ORDER — MAGNESIUM HYDROXIDE 400 MG/1
30 TABLET, CHEWABLE ORAL DAILY
Refills: 0 | Status: DISCONTINUED | OUTPATIENT
Start: 2019-09-23 | End: 2019-09-26

## 2019-09-23 RX ORDER — BUPIVACAINE 13.3 MG/ML
20 INJECTION, SUSPENSION, LIPOSOMAL INFILTRATION ONCE
Refills: 0 | Status: DISCONTINUED | OUTPATIENT
Start: 2019-09-23 | End: 2019-09-26

## 2019-09-23 RX ADMIN — Medication 100 MILLIGRAM(S): at 22:17

## 2019-09-23 RX ADMIN — Medication 15 MILLIGRAM(S): at 23:50

## 2019-09-23 RX ADMIN — Medication 15 MILLIGRAM(S): at 23:35

## 2019-09-23 RX ADMIN — APREPITANT 40 MILLIGRAM(S): 80 CAPSULE ORAL at 10:34

## 2019-09-23 RX ADMIN — Medication 1000 MILLIGRAM(S): at 10:34

## 2019-09-23 RX ADMIN — Medication 975 MILLIGRAM(S): at 22:16

## 2019-09-23 RX ADMIN — CELECOXIB 400 MILLIGRAM(S): 200 CAPSULE ORAL at 10:34

## 2019-09-23 RX ADMIN — Medication 300 MILLIGRAM(S): at 23:35

## 2019-09-23 RX ADMIN — CHLORHEXIDINE GLUCONATE 1 APPLICATION(S): 213 SOLUTION TOPICAL at 10:03

## 2019-09-23 RX ADMIN — MORPHINE SULFATE 4 MILLIGRAM(S): 50 CAPSULE, EXTENDED RELEASE ORAL at 18:54

## 2019-09-23 RX ADMIN — Medication 975 MILLIGRAM(S): at 23:16

## 2019-09-23 RX ADMIN — Medication 15 MILLIGRAM(S): at 18:30

## 2019-09-23 RX ADMIN — MORPHINE SULFATE 4 MILLIGRAM(S): 50 CAPSULE, EXTENDED RELEASE ORAL at 18:39

## 2019-09-23 RX ADMIN — Medication 15 MILLIGRAM(S): at 18:15

## 2019-09-23 NOTE — H&P ADULT - NSHPLABSRESULTS_GEN_ALL_CORE
Preop CBC, BMP, PT/INR, PTT, UA WNL  Urine culture with >100,000 Lactobacillus jensenii - likely contaminant - will repeat UA and urine culture DOS  Nasal swab neg  Preop CXR WNL per clearance   Preop EKG WNL per clearance

## 2019-09-23 NOTE — H&P ADULT - PROBLEM SELECTOR PLAN 1
Admit to Orthopedic service  For elective left total hip replacement, robotic assisted.  Medically cleared for surgery by Admit to Orthopedic service  For elective left total hip replacement, robotic assisted.  Medically cleared for surgery by Dr. Vásquez, cardiac clearance by Dr. Hutson

## 2019-09-23 NOTE — H&P ADULT - NSICDXPASTMEDICALHX_GEN_ALL_CORE_FT
PAST MEDICAL HISTORY:  Asthma childhood    HLD (hyperlipidemia)     Hypertension     Obesity     Osteoarthritis of hips, bilateral

## 2019-09-23 NOTE — PHYSICAL THERAPY INITIAL EVALUATION ADULT - GENERAL OBSERVATIONS, REHAB EVAL
Received supine in NAD, denies pain at rest +EKG, IV hep, B SCD. left as found in recovery pain L hip 3/10

## 2019-09-23 NOTE — H&P ADULT - HISTORY OF PRESENT ILLNESS
62 yo F with left hip pain x     Presents today for elective left total hip replacement, robotic assisted. 62 yo F with left hip pain x 1 month worsened over time without improvement. Failed conservative treatments. Denies numbness, tingling. Ambulates without assist. She had her right hip replaced 3 years ago without issue. Pt denies any recent fevers/chills, chest pain, or shortness of breath. Denies history of DVT/PE. Presents today for elective left total hip replacement, robotic assisted.

## 2019-09-23 NOTE — PHYSICAL THERAPY INITIAL EVALUATION ADULT - PERTINENT HX OF CURRENT PROBLEM, REHAB EVAL
64 yo F with left hip pain x 1 month worsened over time without improvement. Failed conservative treatments. Denies numbness, tingling. Ambulates without assist. She had her right hip replaced 3 years ago without issue

## 2019-09-23 NOTE — H&P ADULT - NSHPPHYSICALEXAM_GEN_ALL_CORE
MSK: decreased ROM of left hip secondary to pain    Rest of PE per medical clearance NAD, sitting comfortably in chair  MSK: Decreased ROM of left hip secondary to pain, sensation intact to light touch in bilateral lower extremities, DP 2+ bilaterally, EHL/FHL/TA/GS 5/5 BLE  Rest of PE per medical clearance

## 2019-09-23 NOTE — PROGRESS NOTE ADULT - SUBJECTIVE AND OBJECTIVE BOX
POST OPERATIVE DAY #:  0  STATUS POST: Left total hip replacement                SUBJECTIVE: Patient seen and examined. States to doing well. Patient denies any CP, SOB, fever, chills, numbness/tingling.     Pain:  well controlled      OBJECTIVE:     Vital Signs Last 24 Hrs  T(C): 36.2 (23 Sep 2019 18:19), Max: 36.2 (23 Sep 2019 18:19)  T(F): 97.1 (23 Sep 2019 18:19), Max: 97.1 (23 Sep 2019 18:19)  HR: 88 (23 Sep 2019 18:19) (88 - 100)  BP: 147/89 (23 Sep 2019 18:19) (115/72 - 147/89)  BP(mean): 109 (23 Sep 2019 17:22) (89 - 109)  RR: 17 (23 Sep 2019 18:19) (12 - 20)  SpO2: 100% (23 Sep 2019 18:19) (96% - 100%)    Affected extremity:          Dressing: clean/dry/intact          Sensation: intact to light touch          Motor exam: 5/5 to EHL/TA/GS         warm, well-perfused; capillary refill < 3 seconds              I&O's Detail      LABS:            Urinalysis Basic - ( 23 Sep 2019 10:57 )    Color: Yellow / Appearance: Clear / S.015 / pH: x  Gluc: x / Ketone: NEGATIVE  / Bili: Negative / Urobili: 0.2 E.U./dL   Blood: x / Protein: NEGATIVE mg/dL / Nitrite: NEGATIVE   Leuk Esterase: Small / RBC: < 5 /HPF / WBC 5-10 /HPF   Sq Epi: x / Non Sq Epi: Moderate /HPF / Bacteria: Many /HPF        MEDICATIONS:    acetaminophen   Tablet .. 975 milliGRAM(s) Oral every 8 hours  celecoxib 200 milliGRAM(s) Oral two times a day  ketorolac   Injectable 15 milliGRAM(s) IV Push every 6 hours  morphine  - Injectable 4 milliGRAM(s) IV Push every 15 minutes PRN  morphine  - Injectable 4 milliGRAM(s) IV Push every 4 hours PRN  ondansetron Injectable 4 milliGRAM(s) IV Push every 6 hours PRN  oxyCODONE    IR 5 milliGRAM(s) Oral every 4 hours PRN  oxyCODONE    IR 10 milliGRAM(s) Oral every 4 hours PRN    aspirin enteric coated 325 milliGRAM(s) Oral two times a day      RADIOLOGY & ADDITIONAL STUDIES: Left hip: Hardware is intact and in good alignment. No acute fracture or dislocation,     ASSESSMENT AND PLAN:     1. Analgesic pain control  2. DVT prophylaxis: ASA       SCDs      Other:   3. Continue PT  4. Weight Bearing Status:  Weight bearing as tolerated     5. Disposition: Pending PT eval

## 2019-09-23 NOTE — PHYSICAL THERAPY INITIAL EVALUATION ADULT - LEVEL OF INDEPENDENCE: SUPINE/SIT, REHAB EVAL
dangled x 10 sec, patient became dizzy and nauseous; further therapy not appropriate, VSS, 144/87 mmHg, 105 bpm, 96% on room air/minimum assist (75% patients effort)

## 2019-09-23 NOTE — H&P ADULT - NSICDXPASTSURGICALHX_GEN_ALL_CORE_FT
PAST SURGICAL HISTORY:  H/O breast surgery bilateral lift    History of hip replacement, total, right 2016

## 2019-09-24 ENCOUNTER — TRANSCRIPTION ENCOUNTER (OUTPATIENT)
Age: 64
End: 2019-09-24

## 2019-09-24 ENCOUNTER — MEDICATION RENEWAL (OUTPATIENT)
Age: 64
End: 2019-09-24

## 2019-09-24 LAB
ANION GAP SERPL CALC-SCNC: 10 MMOL/L — SIGNIFICANT CHANGE UP (ref 5–17)
BUN SERPL-MCNC: 14 MG/DL — SIGNIFICANT CHANGE UP (ref 7–23)
CALCIUM SERPL-MCNC: 8.5 MG/DL — SIGNIFICANT CHANGE UP (ref 8.4–10.5)
CHLORIDE SERPL-SCNC: 106 MMOL/L — SIGNIFICANT CHANGE UP (ref 96–108)
CO2 SERPL-SCNC: 25 MMOL/L — SIGNIFICANT CHANGE UP (ref 22–31)
CREAT SERPL-MCNC: 0.62 MG/DL — SIGNIFICANT CHANGE UP (ref 0.5–1.3)
CULTURE RESULTS: SIGNIFICANT CHANGE UP
GLUCOSE SERPL-MCNC: 130 MG/DL — HIGH (ref 70–99)
HCT VFR BLD CALC: 32.9 % — LOW (ref 34.5–45)
HCV AB S/CO SERPL IA: 0.13 S/CO — SIGNIFICANT CHANGE UP
HCV AB SERPL-IMP: SIGNIFICANT CHANGE UP
HGB BLD-MCNC: 10.6 G/DL — LOW (ref 11.5–15.5)
MCHC RBC-ENTMCNC: 30.5 PG — SIGNIFICANT CHANGE UP (ref 27–34)
MCHC RBC-ENTMCNC: 32.2 GM/DL — SIGNIFICANT CHANGE UP (ref 32–36)
MCV RBC AUTO: 94.5 FL — SIGNIFICANT CHANGE UP (ref 80–100)
NRBC # BLD: 0 /100 WBCS — SIGNIFICANT CHANGE UP (ref 0–0)
PLATELET # BLD AUTO: 194 K/UL — SIGNIFICANT CHANGE UP (ref 150–400)
POTASSIUM SERPL-MCNC: 4.3 MMOL/L — SIGNIFICANT CHANGE UP (ref 3.5–5.3)
POTASSIUM SERPL-SCNC: 4.3 MMOL/L — SIGNIFICANT CHANGE UP (ref 3.5–5.3)
RBC # BLD: 3.48 M/UL — LOW (ref 3.8–5.2)
RBC # FLD: 13.7 % — SIGNIFICANT CHANGE UP (ref 10.3–14.5)
SODIUM SERPL-SCNC: 141 MMOL/L — SIGNIFICANT CHANGE UP (ref 135–145)
SPECIMEN SOURCE: SIGNIFICANT CHANGE UP
WBC # BLD: 14.17 K/UL — HIGH (ref 3.8–10.5)
WBC # FLD AUTO: 14.17 K/UL — HIGH (ref 3.8–10.5)

## 2019-09-24 RX ORDER — PANTOPRAZOLE SODIUM 20 MG/1
1 TABLET, DELAYED RELEASE ORAL
Qty: 30 | Refills: 0
Start: 2019-09-24 | End: 2019-10-23

## 2019-09-24 RX ORDER — POLYETHYLENE GLYCOL 3350 17 G/17G
17 POWDER, FOR SOLUTION ORAL
Qty: 0 | Refills: 0 | DISCHARGE
Start: 2019-09-24

## 2019-09-24 RX ORDER — ACETAMINOPHEN 500 MG
2 TABLET ORAL
Qty: 0 | Refills: 0 | DISCHARGE

## 2019-09-24 RX ORDER — SENNA PLUS 8.6 MG/1
2 TABLET ORAL
Qty: 0 | Refills: 0 | DISCHARGE
Start: 2019-09-24

## 2019-09-24 RX ORDER — CELECOXIB 200 MG/1
1 CAPSULE ORAL
Qty: 0 | Refills: 0 | DISCHARGE
Start: 2019-09-24

## 2019-09-24 RX ORDER — ASPIRIN/CALCIUM CARB/MAGNESIUM 324 MG
1 TABLET ORAL
Qty: 56 | Refills: 0
Start: 2019-09-24 | End: 2019-10-21

## 2019-09-24 RX ORDER — DOCUSATE SODIUM 100 MG
1 CAPSULE ORAL
Qty: 0 | Refills: 0 | DISCHARGE
Start: 2019-09-24

## 2019-09-24 RX ORDER — ACETAMINOPHEN 500 MG
3 TABLET ORAL
Qty: 0 | Refills: 0 | DISCHARGE
Start: 2019-09-24

## 2019-09-24 RX ADMIN — Medication 15 MILLIGRAM(S): at 19:53

## 2019-09-24 RX ADMIN — Medication 975 MILLIGRAM(S): at 13:20

## 2019-09-24 RX ADMIN — Medication 100 MILLIGRAM(S): at 21:06

## 2019-09-24 RX ADMIN — OXYCODONE HYDROCHLORIDE 10 MILLIGRAM(S): 5 TABLET ORAL at 13:20

## 2019-09-24 RX ADMIN — Medication 100 MILLIGRAM(S): at 05:19

## 2019-09-24 RX ADMIN — OXYCODONE HYDROCHLORIDE 10 MILLIGRAM(S): 5 TABLET ORAL at 07:57

## 2019-09-24 RX ADMIN — Medication 15 MILLIGRAM(S): at 05:33

## 2019-09-24 RX ADMIN — POLYETHYLENE GLYCOL 3350 17 GRAM(S): 17 POWDER, FOR SOLUTION ORAL at 12:54

## 2019-09-24 RX ADMIN — Medication 975 MILLIGRAM(S): at 05:19

## 2019-09-24 RX ADMIN — ONDANSETRON 4 MILLIGRAM(S): 8 TABLET, FILM COATED ORAL at 15:23

## 2019-09-24 RX ADMIN — Medication 975 MILLIGRAM(S): at 12:56

## 2019-09-24 RX ADMIN — PANTOPRAZOLE SODIUM 40 MILLIGRAM(S): 20 TABLET, DELAYED RELEASE ORAL at 05:19

## 2019-09-24 RX ADMIN — CELECOXIB 200 MILLIGRAM(S): 200 CAPSULE ORAL at 20:15

## 2019-09-24 RX ADMIN — Medication 15 MILLIGRAM(S): at 13:20

## 2019-09-24 RX ADMIN — OXYCODONE HYDROCHLORIDE 10 MILLIGRAM(S): 5 TABLET ORAL at 12:57

## 2019-09-24 RX ADMIN — CELECOXIB 200 MILLIGRAM(S): 200 CAPSULE ORAL at 19:53

## 2019-09-24 RX ADMIN — Medication 15 MILLIGRAM(S): at 20:15

## 2019-09-24 RX ADMIN — OXYCODONE HYDROCHLORIDE 10 MILLIGRAM(S): 5 TABLET ORAL at 08:30

## 2019-09-24 RX ADMIN — Medication 975 MILLIGRAM(S): at 06:19

## 2019-09-24 RX ADMIN — Medication 1 TABLET(S): at 12:59

## 2019-09-24 RX ADMIN — Medication 325 MILLIGRAM(S): at 05:18

## 2019-09-24 RX ADMIN — Medication 15 MILLIGRAM(S): at 05:18

## 2019-09-24 RX ADMIN — Medication 975 MILLIGRAM(S): at 21:06

## 2019-09-24 RX ADMIN — Medication 15 MILLIGRAM(S): at 12:56

## 2019-09-24 RX ADMIN — Medication 325 MILLIGRAM(S): at 19:53

## 2019-09-24 RX ADMIN — Medication 100 MILLIGRAM(S): at 12:57

## 2019-09-24 NOTE — PROGRESS NOTE ADULT - ASSESSMENT
A/P: 63yFemale s/p L JOVI  - Stable  - Pain/Nausea Control  - Home meds  - AM labs unremarkable  - DVT ppx:  BID  - WBS: WBAT LLE  - PT: pending full PT eval      Ortho Pager [phone]

## 2019-09-24 NOTE — DISCHARGE NOTE PROVIDER - CARE PROVIDER_API CALL
Al Raygoza)  Orthopaedic Surgery  [address], [address] [place] [address]  Phone: [phone]  Fax: [phone]  Follow Up Time:

## 2019-09-24 NOTE — DISCHARGE NOTE PROVIDER - HOSPITAL COURSE
Admitted    Surgery 09/23/2019 - left total hip replacement    Rosanne-op Antibiotics    Pain control    DVT prophylaxis    OOB/Physical Therapy

## 2019-09-24 NOTE — DISCHARGE NOTE PROVIDER - NSDCFUADDINST_GEN_ALL_CORE_FT
See attached instructions from Dr. Raygoza  Weight bear as tolerated with assistive device  No strenuous activity, heavy lifting, driving or returning to work until cleared by MD.  You may shower - dressing is water-resistant, no soaking in bathtubs.  Remove dressing after post op day 5-7, then leave incision open to air. Keep incision clean and dry.  Try to have regular bowel movements, take stool softener or laxative if necessary.  May take Pepcid or Zantac for upset stomach.  May take Aleve or Naproxen instead of Meloxicam/Celebrex.  Swelling may travel all the way down leg to foot, this is normal and will subside in a few weeks.  Call to schedule an appt with Dr. Raygoza for follow up, if you have staples or sutures they will be removed in office.  Contact your doctor if you experience: fever greater than 101.5, chills, chest pain, difficulty breathing, redness or excessive drainage around the incision, other concerns.  Follow up with your primary care provider. **Please see Dr. Raygoza's separate discharge instructions sheet. Your medications were sent to [facility], located on the first floor of [facility]. Take medications as prescribed.    _______________________________    Weight bear as tolerated with assistive device  No strenuous activity, heavy lifting, driving or returning to work until cleared by MD.  You may shower - dressing is water-resistant, no soaking in bathtubs.  Remove dressing after post op day 5-7, then leave incision open to air. Keep incision clean and dry.  Try to have regular bowel movements, take stool softener or laxative if necessary.  May take Pepcid or Zantac for upset stomach.  May take Aleve or Naproxen instead of Meloxicam/Celebrex.  Swelling may travel all the way down leg to foot, this is normal and will subside in a few weeks.  Call to schedule an appt with Dr. Raygoza for follow up, if you have staples or sutures they will be removed in office.  Contact your doctor if you experience: fever greater than 101.5, chills, chest pain, difficulty breathing, redness or excessive drainage around the incision, other concerns.  Follow up with your primary care provider.

## 2019-09-24 NOTE — DISCHARGE NOTE PROVIDER - NSDCCPTREATMENT_GEN_ALL_CORE_FT
PRINCIPAL PROCEDURE  Procedure: Left total hip arthroplasty  Findings and Treatment: superior approach

## 2019-09-24 NOTE — DISCHARGE NOTE PROVIDER - NSDCCPCAREPLAN_GEN_ALL_CORE_FT
PRINCIPAL DISCHARGE DIAGNOSIS  Diagnosis: Osteoarthritis of left hip  Assessment and Plan of Treatment: Osteoarthritis of left hip

## 2019-09-24 NOTE — PROGRESS NOTE ADULT - SUBJECTIVE AND OBJECTIVE BOX
Ortho Note    Pt comfortable without complaints, pain controlled  Denies CP, SOB, N/V, numbness/tingling     Vital Signs Last 24 Hrs  T(C): 36.8 (24 Sep 2019 04:55), Max: 36.8 (24 Sep 2019 04:55)  T(F): 98.3 (24 Sep 2019 04:55), Max: 98.3 (24 Sep 2019 04:55)  HR: 88 (24 Sep 2019 04:55) (88 - 100)  BP: 112/68 (24 Sep 2019 07:55) (100/61 - 147/89)  BP(mean): 109 (23 Sep 2019 17:22) (89 - 109)  RR: 17 (24 Sep 2019 04:55) (12 - 20)  SpO2: 96% (24 Sep 2019 04:55) (96% - 100%)      VSS  General: A&Ox3, NAD  LLE: Aquacell DSG C/D/I  Pulses: Foot WWP; DP pulse 2+; Cap refill < 2 sec  Sensation: SILT distally and symmetric to contralateral extremity  Motor: TA/EHL/FHL/GS 5/5 and symmetric to contralateral extremity                          10.6   14.17 )-----------( 194      ( 24 Sep 2019 05:59 )             32.9     24 Sep 2019 05:59    141    |  106    |  14     ----------------------------<  130    4.3     |  25     |  0.62     Ca    8.5        24 Sep 2019 05:59

## 2019-09-24 NOTE — DISCHARGE NOTE PROVIDER - NSDCHOSPICE_GEN_A_CORE
Chief complaint:  Cough and other troublesome symptoms    This note was dictated using voice recognition software and may contain errors.    History:    She had a 2:00 p.m. appointment on Tuesday September 3, 2019.  Information in her medical record regarding her past medical history, family history, and social history, were reviewed and updated today.  Significant additions, if any, are as noted below.    She stated after her appointment with me on April 1, 2019 she did not perceive improvement in her coughing subsequent to receiving the intramuscular injection of Depo-Medrol 40 mg.  She continues to experience coughing at this time.  The cough may be productive of clear mucus.  The cough can awaken her from sleep.  There is no associated shortness of breath with the coughing.  She stated she is found using Mucinex DM in Zyrtec to be of some benefit.    She mentions that she is experiencing excessive tearing and anterior rhinorrhea.    She stated that she does experience GE reflux.  Currently I do not believe that she is taking any medication for the treatment of reflux.    Review of systems:  See above.  She stated that she has developed tenderness over her anterior face in the past few days.  She stated that she has developed some sores or ulcers inside the right nostril within the past few days.  She stated during the past 2 or 3 weeks she perceives a change in her sensation of taste.  In addition to excessive tearing she stated that she has experienced some crusting of her eyelashes in the morning.    Exam:    In general she is in no distress.  She is alert oriented well-developed in good mood and attentive  Gait steady  Skin no rash noted  Head no swelling noted  Eyes scleral white conjunctiva pink  Nose patent no polyp seen no obvious ulcers noted in the right nasal passage her nostril  Ears not inflamed tympanic membranes not inflamed  Neck no masses or thyromegaly noted  Lymph nodes no significant  cervical or epitrochlear lymphadenopathy noted  Lungs clear to auscultation.  Normal breath sounds.  No wheezing heard.  Heart regular rhythm no murmurs heard  Extremities no swelling or inflammation of the hands legs noted    Impression:    1.  Cough, exact cause uncertain  2.  Chronic rhinitis  3.  GE reflux  4.  Other health concerns as noted in her medical record    Assessment plan:    Her appointment was 60 min in duration spent entirely in face-to-face contact.  More than 50% of the visit was spent in counseling and coordination of care.    We discussed options for treatment.  She elected receive an intramuscular injection of Celestone 9 mg.  While she was here today the injection medication was administered by my nurse.  She was observed for 15 min thereafter.  She tolerated administration of the medication.    I reviewed with her the reasoning for using Celestone as an anti-inflammatory agent.  I told her I could not state was certainty wire cough has been persistent since earlier this year.    We discussed the problem of dry eyes.  We discussed reflex tearing.  She stated that she is aware of the fact that she does have dry eyes.  She uses Systane eye drops.    In view of her facial tenderness I recommended that blood be drawn for performance of a CBC, sed rate, and C reactive protein.  I recommended that sinus x-rays be performed.  In view of the persistence of her cough I recommended that a chest x-ray be performed.  This is agreeable with her.  Arrangements were made to have the diagnostic tests performed after her appointment with me today.  When the results are available to review with her, I will do so.    Using anatomical teaching models of the nose and head I reviewed anatomy with her.  She was instructed in the proper technique for using a nasal spray.    Should she continue to perceive altered taste and other symptoms originating from or involving her tongue, I have recommended that she be seen in  consultation by ENT.  Should the inflammatory lesions in the right nostril and nasal passage persist I recommended she be seen in consultation by ENT    She may consider putting a bland substance such as Vaseline inside the right nostril, a thin film.    Should anterior rhinorrhea continue to be a troublesome problem, evaluation of Atrovent nasal spray may be considered.    She was given the office phone number.  Should she have additional questions or concerns she was instructed to call.         No

## 2019-09-24 NOTE — PROGRESS NOTE ADULT - SUBJECTIVE AND OBJECTIVE BOX
Ortho Note    Pt comfortable without complaints, pain controlled  Denies CP, SOB, N/V, numbness/tingling     Vital Signs Last 24 Hrs  T(C): 37.2 (09-24-19 @ 08:30), Max: 37.2 (09-24-19 @ 08:30)  T(F): 99 (09-24-19 @ 08:30), Max: 99 (09-24-19 @ 08:30)  HR: 78 (09-24-19 @ 08:30) (78 - 78)  BP: 115/62 (09-24-19 @ 08:30) (112/68 - 115/62)  BP(mean): --  RR: 15 (09-24-19 @ 08:30) (15 - 15)  SpO2: 98% (09-24-19 @ 08:30) (98% - 98%)  AVSS    General: Pt Alert and oriented, NAD  DSG C/D/I  Pulses: +2DP, WWP feet  Sensation: SILT  Motor: 5/5 EHL/FHL/TA/GS                          10.6   14.17 )-----------( 194      ( 24 Sep 2019 05:59 )             32.9   24 Sep 2019 05:59    141    |  106    |  14     ----------------------------<  130    4.3     |  25     |  0.62           A/P: 63yFemale POD#1 s/p left total hip replacement (superior)  - Stable  - Pain Control  - DVT ppx: ASA bid  - PT, WBS: WBAT  - continue bowel regimen  - OOB for meals, I/S  - dispo: home with [facility], pending clearance    Ortho Pager [phone]

## 2019-09-24 NOTE — DISCHARGE NOTE PROVIDER - NSDCHHNEEDSERVICE_GEN_ALL_CORE
Observation and assessment/Rehabilitation services/Wound care and assessment/Medication teaching and assessment/Teaching and training

## 2019-09-25 PROCEDURE — 99223 1ST HOSP IP/OBS HIGH 75: CPT

## 2019-09-25 RX ADMIN — OXYCODONE HYDROCHLORIDE 10 MILLIGRAM(S): 5 TABLET ORAL at 11:27

## 2019-09-25 RX ADMIN — Medication 975 MILLIGRAM(S): at 21:22

## 2019-09-25 RX ADMIN — PANTOPRAZOLE SODIUM 40 MILLIGRAM(S): 20 TABLET, DELAYED RELEASE ORAL at 06:30

## 2019-09-25 RX ADMIN — Medication 100 MILLIGRAM(S): at 06:30

## 2019-09-25 RX ADMIN — Medication 975 MILLIGRAM(S): at 06:27

## 2019-09-25 RX ADMIN — Medication 975 MILLIGRAM(S): at 06:30

## 2019-09-25 RX ADMIN — CELECOXIB 200 MILLIGRAM(S): 200 CAPSULE ORAL at 17:29

## 2019-09-25 RX ADMIN — Medication 975 MILLIGRAM(S): at 22:22

## 2019-09-25 RX ADMIN — CELECOXIB 200 MILLIGRAM(S): 200 CAPSULE ORAL at 06:29

## 2019-09-25 RX ADMIN — CELECOXIB 200 MILLIGRAM(S): 200 CAPSULE ORAL at 17:30

## 2019-09-25 RX ADMIN — Medication 100 MILLIGRAM(S): at 14:32

## 2019-09-25 RX ADMIN — Medication 975 MILLIGRAM(S): at 14:32

## 2019-09-25 RX ADMIN — Medication 1 TABLET(S): at 06:30

## 2019-09-25 RX ADMIN — CELECOXIB 200 MILLIGRAM(S): 200 CAPSULE ORAL at 06:30

## 2019-09-25 RX ADMIN — Medication 325 MILLIGRAM(S): at 06:30

## 2019-09-25 RX ADMIN — Medication 325 MILLIGRAM(S): at 17:29

## 2019-09-25 RX ADMIN — POLYETHYLENE GLYCOL 3350 17 GRAM(S): 17 POWDER, FOR SOLUTION ORAL at 11:27

## 2019-09-25 RX ADMIN — Medication 100 MILLIGRAM(S): at 21:22

## 2019-09-25 RX ADMIN — OXYCODONE HYDROCHLORIDE 10 MILLIGRAM(S): 5 TABLET ORAL at 12:27

## 2019-09-25 NOTE — CONSULT NOTE ADULT - SUBJECTIVE AND OBJECTIVE BOX
Patient is a 63y old  Female who presents with a chief complaint of Left hip pain (25 Sep 2019 09:37)      HPI:  64 yo F with left hip pain x 1 month worsened over time without improvement. Failed conservative treatments. Denies numbness, tingling. Ambulates without assist. She had her right hip replaced 3 years ago without issue. Pt denies any recent fevers/chills, chest pain, or shortness of breath. Denies history of DVT/PE. Presents today for elective left total hip replacement, robotic assisted. (23 Sep 2019 08:35)    Seen after OR. Pain controlled.     REVIEW OF SYSTEMS otherwise negative          Allergies    latex (Rash)  penicillin (Rash)    Intolerances        Home Medications:  acetaminophen 325 mg oral tablet: 3 tab(s) orally every 8 hours (24 Sep 2019 13:47)  celecoxib 200 mg oral capsule: 1 cap(s) orally 2 times a day (24 Sep 2019 13:47)  docusate sodium 100 mg oral capsule: 1 cap(s) orally 3 times a day (24 Sep 2019 13:47)  gabapentin 100 mg oral tablet: 100 milligram(s) orally (23 Sep 2019 10:19)  polyethylene glycol 3350 oral powder for reconstitution: 17 gram(s) orally once a day (24 Sep 2019 13:47)  senna oral tablet: 2 tab(s) orally once a day (at bedtime), As needed, Constipation (24 Sep 2019 13:47)  triamterene-hydrochlorothiazide 37.5 mg- 25 mg oral capsule: 1 cap(s) orally once a day (23 Sep 2019 10:19)  vitamin c: 500 milligram(s) orally once a day (23 Sep 2019 10:19)      PAST MEDICAL & SURGICAL HISTORY:  HLD (hyperlipidemia)  Osteoarthritis of hips, bilateral  Obesity  Hypertension  Asthma: childhood  History of hip replacement, total, right: 2016  H/O breast surgery: bilateral lift        Vital Signs Last 24 Hrs  T(C): 36.9 (25 Sep 2019 10:08), Max: 36.9 (25 Sep 2019 10:08)  T(F): 98.4 (25 Sep 2019 10:08), Max: 98.4 (25 Sep 2019 10:08)  HR: 91 (25 Sep 2019 10:08) (76 - 91)  BP: 125/66 (25 Sep 2019 10:08) (103/68 - 125/66)  BP(mean): --  RR: 16 (25 Sep 2019 10:08) (16 - 18)  SpO2: 96% (25 Sep 2019 10:08) (94% - 96%)   I&O's Detail    24 Sep 2019 07:01  -  25 Sep 2019 07:00  --------------------------------------------------------  IN:    Oral Fluid: 600 mL  Total IN: 600 mL    OUT:  Total OUT: 0 mL    Total NET: 600 mL        Daily     Daily     Physical Exam:   GEN: NAD, AAOx3  HEENT: MMM, no icterus  CV: S1 S2 RRR, no MRG  Lung: CTAB  Abd: soft NT ND +BS  Ext: no c/c/e  Neuro: no focal neuro deficit    MEDICATIONS  (STANDING):  acetaminophen   Tablet .. 975 milliGRAM(s) Oral every 8 hours  aspirin enteric coated 325 milliGRAM(s) Oral two times a day  BUpivacaine liposome 1.3% Injectable (no eMAR) 20 milliLiter(s) Local Injection once  celecoxib 200 milliGRAM(s) Oral two times a day  docusate sodium 100 milliGRAM(s) Oral three times a day  lactated ringers. 1000 milliLiter(s) (140 mL/Hr) IV Continuous <Continuous>  pantoprazole    Tablet 40 milliGRAM(s) Oral before breakfast  polyethylene glycol 3350 17 Gram(s) Oral daily  triamterene 37.5 mG/hydrochlorothiazide 25 mG Tablet 1 Tablet(s) Oral daily    MEDICATIONS  (PRN):  aluminum hydroxide/magnesium hydroxide/simethicone Suspension 30 milliLiter(s) Oral four times a day PRN Indigestion  bisacodyl Suppository 10 milliGRAM(s) Rectal daily PRN If no bowel movement by POD#2  magnesium hydroxide Suspension 30 milliLiter(s) Oral daily PRN Constipation  morphine  - Injectable 4 milliGRAM(s) IV Push every 15 minutes PRN pacu pain  morphine  - Injectable 4 milliGRAM(s) IV Push every 4 hours PRN breakthrough floor pain  ondansetron Injectable 4 milliGRAM(s) IV Push every 6 hours PRN Nausea and/or Vomiting  oxyCODONE    IR 5 milliGRAM(s) Oral every 4 hours PRN Moderate Pain (4 - 6)  oxyCODONE    IR 10 milliGRAM(s) Oral every 4 hours PRN Severe Pain (7 - 10)  senna 2 Tablet(s) Oral at bedtime PRN Constipation                LABS:                        10.6   14.17 )-----------( 194      ( 24 Sep 2019 05:59 )             32.9     09-24    141  |  106  |  14  ----------------------------<  130<H>  4.3   |  25  |  0.62    Ca    8.5      24 Sep 2019 05:59            CAPILLARY BLOOD GLUCOSE          Culture - Urine (collected 23 Sep 2019 11:02)  Source: .Urine None  Final Report (24 Sep 2019 08:25):    Specimen appears CONTAMINATED. Lab suggests repeat clean catch specimen.      RADIOLOGY & ADDITIONAL STUDIES:

## 2019-09-25 NOTE — PROGRESS NOTE ADULT - ASSESSMENT
A/P: 63yFemale s/p L superior JOVI  - Stable  - Pain/Nausea Control  - Home meds  - DVT ppx:  BID  - WBS: WBAT LLE  - PT: Home w HPT  - Plan to d/c home when clears      Ortho Pager [phone]

## 2019-09-25 NOTE — CONSULT NOTE ADULT - ASSESSMENT
63 year old F s/p L JOVI, medicine following for post-op medical management    1) Post-op state: pain controlled, c/w pain control, c/w IS, c/w bowel regimen  2) HTN: c/w triamterene/HCTZ  3) Obesity  4) Asthma  5) DVT ppx:  mg BID  6) Leukocytosis: likely reactive to surgery, no signs of infection  7) Anemia: likely blood loss from OR

## 2019-09-25 NOTE — PROGRESS NOTE ADULT - SUBJECTIVE AND OBJECTIVE BOX
Ortho Note    Pt comfortable without complaints, pain controlled  Denies CP, SOB, N/V, numbness/tingling     Vital Signs Last 24 Hrs  T(C): 36.7 (25 Sep 2019 06:01), Max: 37.2 (24 Sep 2019 08:30)  T(F): 98 (25 Sep 2019 06:01), Max: 99 (24 Sep 2019 08:30)  HR: 80 (25 Sep 2019 06:01) (76 - 90)  BP: 112/70 (25 Sep 2019 06:01) (103/68 - 115/62)  BP(mean): --  RR: 18 (25 Sep 2019 06:01) (15 - 18)  SpO2: 95% (25 Sep 2019 06:01) (94% - 98%)    VSS  General: A&Ox3, NAD  LLE: Aquacell DSG C/D/I  Pulses: Foot WWP; DP pulse 2+; Cap refill < 2 sec  Sensation: SILT distally and symmetric to contralateral extremity  Motor: TA/EHL/FHL/GS 5/5 and symmetric to contralateral extremity                          10.6   14.17 )-----------( 194      ( 24 Sep 2019 05:59 )             32.9     24 Sep 2019 05:59    141    |  106    |  14     ----------------------------<  130    4.3     |  25     |  0.62     Ca    8.5        24 Sep 2019 05:59

## 2019-09-26 ENCOUNTER — TRANSCRIPTION ENCOUNTER (OUTPATIENT)
Age: 64
End: 2019-09-26

## 2019-09-26 VITALS
HEART RATE: 94 BPM | TEMPERATURE: 98 F | DIASTOLIC BLOOD PRESSURE: 74 MMHG | SYSTOLIC BLOOD PRESSURE: 109 MMHG | RESPIRATION RATE: 17 BRPM | OXYGEN SATURATION: 95 %

## 2019-09-26 PROCEDURE — 99233 SBSQ HOSP IP/OBS HIGH 50: CPT

## 2019-09-26 RX ADMIN — CELECOXIB 200 MILLIGRAM(S): 200 CAPSULE ORAL at 06:31

## 2019-09-26 RX ADMIN — Medication 325 MILLIGRAM(S): at 05:31

## 2019-09-26 RX ADMIN — OXYCODONE HYDROCHLORIDE 10 MILLIGRAM(S): 5 TABLET ORAL at 04:40

## 2019-09-26 RX ADMIN — OXYCODONE HYDROCHLORIDE 10 MILLIGRAM(S): 5 TABLET ORAL at 05:40

## 2019-09-26 RX ADMIN — Medication 975 MILLIGRAM(S): at 05:31

## 2019-09-26 RX ADMIN — Medication 1 TABLET(S): at 05:31

## 2019-09-26 RX ADMIN — Medication 975 MILLIGRAM(S): at 06:31

## 2019-09-26 RX ADMIN — Medication 100 MILLIGRAM(S): at 05:31

## 2019-09-26 RX ADMIN — ONDANSETRON 4 MILLIGRAM(S): 8 TABLET, FILM COATED ORAL at 11:52

## 2019-09-26 RX ADMIN — CELECOXIB 200 MILLIGRAM(S): 200 CAPSULE ORAL at 05:31

## 2019-09-26 RX ADMIN — PANTOPRAZOLE SODIUM 40 MILLIGRAM(S): 20 TABLET, DELAYED RELEASE ORAL at 05:31

## 2019-09-26 RX ADMIN — MAGNESIUM HYDROXIDE 30 MILLILITER(S): 400 TABLET, CHEWABLE ORAL at 05:33

## 2019-09-26 NOTE — PROGRESS NOTE ADULT - ASSESSMENT
A/P: 63yFemale s/p L superior JOVI  - Stable  - Pain/Nausea Control  - Home meds  - DVT ppx:  BID  - WBS: WBAT LLE  - PT: home w home PT  - Pt likely to clear PT today -- d/c when clears      Ortho Pager [phone]

## 2019-09-26 NOTE — PROGRESS NOTE ADULT - ASSESSMENT
63 year old F s/p L JOVI, medicine following for post-op medical management    1) Post-op state: pain controlled, c/w pain control, c/w IS, c/w bowel regimen  2) HTN: c/w triamterene/HCTZ, DASH diet  3) Obesity  4) Asthma  5) DVT ppx:  mg BID  6) Leukocytosis: likely reactive to surgery, no signs of infection

## 2019-09-26 NOTE — DISCHARGE NOTE NURSING/CASE MANAGEMENT/SOCIAL WORK - PATIENT PORTAL LINK FT
You can access the FollowMyHealth Patient Portal offered by [facility] by registering at the following website: [email or web address]. By joining [facility]’s FollowMyHealth portal, you will also be able to view your health information using other applications (apps) compatible with our system.

## 2019-09-26 NOTE — PROGRESS NOTE ADULT - SUBJECTIVE AND OBJECTIVE BOX
OVERNIGHT EVENTS: JAG    SUBJECTIVE / INTERVAL HPI: Patient seen and examined at bedside. Also seen walking with PT in the halls.     VITAL SIGNS:  Vital Signs Last 24 Hrs  T(C): 36.7 (26 Sep 2019 08:30), Max: 36.9 (25 Sep 2019 20:15)  T(F): 98 (26 Sep 2019 08:30), Max: 98.5 (25 Sep 2019 20:15)  HR: 94 (26 Sep 2019 08:30) (88 - 94)  BP: 109/74 (26 Sep 2019 08:30) (109/71 - 125/74)  BP(mean): --  RR: 17 (26 Sep 2019 08:30) (17 - 18)  SpO2: 95% (26 Sep 2019 08:30) (94% - 97%)    Physical Exam:   GEN: NAD, AAOx3  HEENT: MMM, no icterus  CV: S1 S2 RRR, no MRG  Lung: CTAB  Abd: soft NT ND +BS  Ext: no c/c/e  Neuro: no focal neuro deficit    MEDICATIONS:  MEDICATIONS  (STANDING):  acetaminophen   Tablet .. 975 milliGRAM(s) Oral every 8 hours  aspirin enteric coated 325 milliGRAM(s) Oral two times a day  BUpivacaine liposome 1.3% Injectable (no eMAR) 20 milliLiter(s) Local Injection once  celecoxib 200 milliGRAM(s) Oral two times a day  docusate sodium 100 milliGRAM(s) Oral three times a day  lactated ringers. 1000 milliLiter(s) (140 mL/Hr) IV Continuous <Continuous>  pantoprazole    Tablet 40 milliGRAM(s) Oral before breakfast  polyethylene glycol 3350 17 Gram(s) Oral daily  triamterene 37.5 mG/hydrochlorothiazide 25 mG Tablet 1 Tablet(s) Oral daily    MEDICATIONS  (PRN):  aluminum hydroxide/magnesium hydroxide/simethicone Suspension 30 milliLiter(s) Oral four times a day PRN Indigestion  bisacodyl Suppository 10 milliGRAM(s) Rectal daily PRN If no bowel movement by POD#2  magnesium hydroxide Suspension 30 milliLiter(s) Oral daily PRN Constipation  morphine  - Injectable 4 milliGRAM(s) IV Push every 15 minutes PRN pacu pain  morphine  - Injectable 4 milliGRAM(s) IV Push every 4 hours PRN breakthrough floor pain  ondansetron Injectable 4 milliGRAM(s) IV Push every 6 hours PRN Nausea and/or Vomiting  oxyCODONE    IR 5 milliGRAM(s) Oral every 4 hours PRN Moderate Pain (4 - 6)  oxyCODONE    IR 10 milliGRAM(s) Oral every 4 hours PRN Severe Pain (7 - 10)  senna 2 Tablet(s) Oral at bedtime PRN Constipation      ALLERGIES:  Allergies    latex (Rash)  penicillin (Rash)    Intolerances        LABS:              CAPILLARY BLOOD GLUCOSE          RADIOLOGY & ADDITIONAL TESTS: Reviewed.    ASSESSMENT:    PLAN:

## 2019-09-26 NOTE — PROGRESS NOTE ADULT - SUBJECTIVE AND OBJECTIVE BOX
Ortho Note    Pt comfortable without complaints, pain controlled  Denies CP, SOB, N/V, numbness/tingling     Vital Signs Last 24 Hrs  T(C): 36.8 (09-26-19 @ 04:35), Max: 36.8 (09-26-19 @ 04:35)  T(F): 98.3 (09-26-19 @ 04:35), Max: 98.3 (09-26-19 @ 04:35)  HR: 88 (09-26-19 @ 04:35) (88 - 88)  BP: 125/74 (09-26-19 @ 05:30) (116/65 - 125/74)  BP(mean): --  RR: 17 (09-26-19 @ 04:35) (17 - 17)  SpO2: 97% (09-26-19 @ 04:35) (97% - 97%)      VSS  General: A&Ox3, NAD  LLE: Aquacell DSG C/D/I  Pulses: Foot WWP; DP pulse 2+; Cap refill < 2 sec  Sensation: SILT distally and symmetric to contralateral extremity  Motor: TA/EHL/FHL/GS 5/5 and symmetric to contralateral extremity

## 2019-09-27 LAB — SURGICAL PATHOLOGY STUDY: SIGNIFICANT CHANGE UP

## 2019-09-30 DIAGNOSIS — J45.909 UNSPECIFIED ASTHMA, UNCOMPLICATED: ICD-10-CM

## 2019-09-30 DIAGNOSIS — Z96.641 PRESENCE OF RIGHT ARTIFICIAL HIP JOINT: ICD-10-CM

## 2019-09-30 DIAGNOSIS — Z88.0 ALLERGY STATUS TO PENICILLIN: ICD-10-CM

## 2019-09-30 DIAGNOSIS — Z98.890 OTHER SPECIFIED POSTPROCEDURAL STATES: ICD-10-CM

## 2019-09-30 DIAGNOSIS — Z91.040 LATEX ALLERGY STATUS: ICD-10-CM

## 2019-09-30 DIAGNOSIS — E66.9 OBESITY, UNSPECIFIED: ICD-10-CM

## 2019-09-30 DIAGNOSIS — K21.9 GASTRO-ESOPHAGEAL REFLUX DISEASE WITHOUT ESOPHAGITIS: ICD-10-CM

## 2019-09-30 DIAGNOSIS — E78.5 HYPERLIPIDEMIA, UNSPECIFIED: ICD-10-CM

## 2019-09-30 DIAGNOSIS — M16.12 UNILATERAL PRIMARY OSTEOARTHRITIS, LEFT HIP: ICD-10-CM

## 2019-09-30 DIAGNOSIS — I10 ESSENTIAL (PRIMARY) HYPERTENSION: ICD-10-CM

## 2019-09-30 DIAGNOSIS — D72.829 ELEVATED WHITE BLOOD CELL COUNT, UNSPECIFIED: ICD-10-CM

## 2019-10-02 PROBLEM — E78.5 HYPERLIPIDEMIA, UNSPECIFIED: Chronic | Status: ACTIVE | Noted: 2019-09-20

## 2019-10-03 PROCEDURE — 97161 PT EVAL LOW COMPLEX 20 MIN: CPT

## 2019-10-03 PROCEDURE — 81001 URINALYSIS AUTO W/SCOPE: CPT

## 2019-10-03 PROCEDURE — 88300 SURGICAL PATH GROSS: CPT

## 2019-10-03 PROCEDURE — 97116 GAIT TRAINING THERAPY: CPT

## 2019-10-03 PROCEDURE — 87086 URINE CULTURE/COLONY COUNT: CPT

## 2019-10-03 PROCEDURE — S2900: CPT

## 2019-10-03 PROCEDURE — 86803 HEPATITIS C AB TEST: CPT

## 2019-10-03 PROCEDURE — 85027 COMPLETE CBC AUTOMATED: CPT

## 2019-10-03 PROCEDURE — 72170 X-RAY EXAM OF PELVIS: CPT

## 2019-10-03 PROCEDURE — 86901 BLOOD TYPING SEROLOGIC RH(D): CPT

## 2019-10-03 PROCEDURE — C1776: CPT

## 2019-10-03 PROCEDURE — 86850 RBC ANTIBODY SCREEN: CPT

## 2019-10-03 PROCEDURE — 86900 BLOOD TYPING SEROLOGIC ABO: CPT

## 2019-10-03 PROCEDURE — 80048 BASIC METABOLIC PNL TOTAL CA: CPT

## 2019-10-03 PROCEDURE — 97110 THERAPEUTIC EXERCISES: CPT

## 2019-10-03 PROCEDURE — 36415 COLL VENOUS BLD VENIPUNCTURE: CPT

## 2019-10-07 ENCOUNTER — FORM ENCOUNTER (OUTPATIENT)
Age: 64
End: 2019-10-07

## 2019-10-08 ENCOUNTER — OUTPATIENT (OUTPATIENT)
Dept: OUTPATIENT SERVICES | Facility: HOSPITAL | Age: 64
LOS: 1 days | End: 2019-10-08
Payer: COMMERCIAL

## 2019-10-08 ENCOUNTER — APPOINTMENT (OUTPATIENT)
Dept: ORTHOPEDIC SURGERY | Facility: CLINIC | Age: 64
End: 2019-10-08
Payer: MEDICAID

## 2019-10-08 DIAGNOSIS — Z98.89 OTHER SPECIFIED POSTPROCEDURAL STATES: Chronic | ICD-10-CM

## 2019-10-08 DIAGNOSIS — Z96.641 PRESENCE OF RIGHT ARTIFICIAL HIP JOINT: Chronic | ICD-10-CM

## 2019-10-08 PROCEDURE — 73501 X-RAY EXAM HIP UNI 1 VIEW: CPT

## 2019-10-08 PROCEDURE — 73501 X-RAY EXAM HIP UNI 1 VIEW: CPT | Mod: 26,LT

## 2019-10-08 PROCEDURE — 99024 POSTOP FOLLOW-UP VISIT: CPT

## 2019-10-08 RX ORDER — PANTOPRAZOLE 40 MG/1
40 TABLET, DELAYED RELEASE ORAL DAILY
Qty: 30 | Refills: 0 | Status: DISCONTINUED | COMMUNITY
Start: 2019-09-24 | End: 2019-10-08

## 2019-10-08 RX ORDER — OXYCODONE AND ACETAMINOPHEN 5; 325 MG/1; MG/1
5-325 TABLET ORAL
Qty: 50 | Refills: 0 | Status: DISCONTINUED | COMMUNITY
Start: 2019-09-24 | End: 2019-10-08

## 2019-10-08 NOTE — HISTORY OF PRESENT ILLNESS
[Doing Well] : is doing well [No Sign of Infection] : is showing no signs of infection [Excellent Pain Control] : has excellent pain control [de-identified] : Patient is alert and oriented x3.\par Leg lengths clinically equal \par Left hip: Well-healed posterior surgical scar without erythema or ecchymosis. Acceptable post op swelling. ROM from full extension to 90 degrees of flexion. External rotation 40 degrees, internal rotation neutral, abduction 45 degrees, adduction 10 degrees. Calf soft and nontender. NVI. Flexor power 5-/5.  [de-identified] : Patient presents for first post op visit s/p left posterior JOVI 9/23/19. She states she is doiong well overall. Pain is minimal, well controlled without narcotic pain medication. She is ambulating with a cane. She did not receive homecare post op but has been doing HEP. She is taking ASA BID for DVT ppx. [de-identified] : First post op left total hip replacement, surgical date 9/23/19 [de-identified] : X-rays taken today demonstrate well-fixed left total hip replacement in overall good alignment [de-identified] : Patient is progressing well 2 weeks post op. Advised to gradually increase activity as tolerated. Continue ASA BID for DVT ppx. Follow up in 4-6 weeks.

## 2019-10-08 NOTE — ADDENDUM
[FreeTextEntry1] : Dr. Raygoza was physically present during the key portions the encounter, provided assistance as needed, and formulated the assessment and plan as documented above.\par \par

## 2019-11-04 ENCOUNTER — FORM ENCOUNTER (OUTPATIENT)
Age: 64
End: 2019-11-04

## 2019-11-05 ENCOUNTER — APPOINTMENT (OUTPATIENT)
Dept: ORTHOPEDIC SURGERY | Facility: CLINIC | Age: 64
End: 2019-11-05
Payer: MEDICAID

## 2019-11-05 ENCOUNTER — OUTPATIENT (OUTPATIENT)
Dept: OUTPATIENT SERVICES | Facility: HOSPITAL | Age: 64
LOS: 1 days | End: 2019-11-05
Payer: COMMERCIAL

## 2019-11-05 DIAGNOSIS — Z96.641 PRESENCE OF RIGHT ARTIFICIAL HIP JOINT: Chronic | ICD-10-CM

## 2019-11-05 DIAGNOSIS — Z98.89 OTHER SPECIFIED POSTPROCEDURAL STATES: Chronic | ICD-10-CM

## 2019-11-05 PROCEDURE — 73501 X-RAY EXAM HIP UNI 1 VIEW: CPT

## 2019-11-05 PROCEDURE — 99024 POSTOP FOLLOW-UP VISIT: CPT

## 2019-11-05 PROCEDURE — 73501 X-RAY EXAM HIP UNI 1 VIEW: CPT | Mod: 26,LT

## 2019-11-05 RX ORDER — CELECOXIB 200 MG/1
200 CAPSULE ORAL TWICE DAILY
Qty: 84 | Refills: 0 | Status: DISCONTINUED | COMMUNITY
Start: 2019-09-24 | End: 2019-11-05

## 2019-11-05 RX ORDER — ASPIRIN/ACETAMINOPHEN/CAFFEINE 500-325-65
325 POWDER IN PACKET (EA) ORAL
Qty: 60 | Refills: 0 | Status: DISCONTINUED | COMMUNITY
Start: 2019-09-24 | End: 2019-11-05

## 2019-11-05 NOTE — HISTORY OF PRESENT ILLNESS
[Clean/Dry/Intact] : clean, dry and intact [Healed] : healed [Doing Well] : is doing well [Excellent Pain Control] : has excellent pain control [No Sign of Infection] : is showing no signs of infection [Chills] : no chills [Fever] : no fever [de-identified] : Second post op left total hip replacement, surgical date 9/23/19 [de-identified] : 64 year old female presents for second post op. She is doing well overall and reports that she is very satisfied with the surgery. She does not report any left hip pain. She can place shoes on the left foot with ease. Patient can walk less than 5 blocks and uses a cane for longer walks. She can sit comfortably in an ordinary chair but reports that she cannot enter public transportation. She takes Aleve as needed for pain.\par Of note, patient plans to have an epidural injection in the next few weeks. [de-identified] : Patient is alert and oriented x3.\par Leg lengths clinically equal.\par Using cane.\par Left hip: Well-healed posterior surgical scar without erythema or ecchymosis. Acceptable post-op swelling. Painless ROM from full extension to 95 degrees of flexion. 10 degrees of internal rotation. 50 degrees of external rotation. 55 degrees of abduction. 15 degrees of adduction. Flexor power 5/5.\par Calf is soft and nontender.\par NVI. [de-identified] : X-ray imaging of the bilateral hips done here today shows well-fixed bilateral THRs in overall good alignment. [de-identified] : Ms. Lai is doing well. She is okay to have a spine epidural injection. \par Follow up in 6-8 weeks.

## 2019-11-05 NOTE — END OF VISIT
[FreeTextEntry3] : All medical record entries made by Michelle Espinoza acting as a scribe for the performing provider (Al Raygoza MD and/or KRISTEN De La Torre) on 11/05/2019. All entries were dictated to me by the performing medical provider. In signing this record, the medical provider affirms that they have personally performed the history, physical exam, assessment and plan and have also directed, reviewed and agreed to the documentation in the chart.

## 2019-12-02 ENCOUNTER — FORM ENCOUNTER (OUTPATIENT)
Age: 64
End: 2019-12-02

## 2019-12-03 ENCOUNTER — OUTPATIENT (OUTPATIENT)
Dept: OUTPATIENT SERVICES | Facility: HOSPITAL | Age: 64
LOS: 1 days | End: 2019-12-03
Payer: COMMERCIAL

## 2019-12-03 ENCOUNTER — APPOINTMENT (OUTPATIENT)
Dept: ORTHOPEDIC SURGERY | Facility: CLINIC | Age: 64
End: 2019-12-03
Payer: MEDICAID

## 2019-12-03 VITALS — BODY MASS INDEX: 36.65 KG/M2 | WEIGHT: 220 LBS | HEIGHT: 65 IN

## 2019-12-03 DIAGNOSIS — Z96.641 PRESENCE OF RIGHT ARTIFICIAL HIP JOINT: Chronic | ICD-10-CM

## 2019-12-03 DIAGNOSIS — Z98.89 OTHER SPECIFIED POSTPROCEDURAL STATES: Chronic | ICD-10-CM

## 2019-12-03 PROCEDURE — 99024 POSTOP FOLLOW-UP VISIT: CPT

## 2019-12-03 PROCEDURE — 73501 X-RAY EXAM HIP UNI 1 VIEW: CPT | Mod: 26,LT

## 2019-12-03 PROCEDURE — 73501 X-RAY EXAM HIP UNI 1 VIEW: CPT

## 2019-12-03 NOTE — END OF VISIT
[FreeTextEntry3] : All medical record entries made by Michelle Espinoza acting as a scribe for the performing provider (Al Raygoza MD and/or KRISTEN De La Torre) on 12/03/2019. All entries were dictated to me by the performing medical provider. In signing this record, the medical provider affirms that they have personally performed the history, physical exam, assessment and plan and have also directed, reviewed and agreed to the documentation in the chart.

## 2019-12-03 NOTE — HISTORY OF PRESENT ILLNESS
[Clean/Dry/Intact] : clean, dry and intact [Healed] : healed [Doing Well] : is doing well [No Sign of Infection] : is showing no signs of infection [Excellent Pain Control] : has excellent pain control [Chills] : no chills none [Fever] : no fever [de-identified] : post op s/p left total hip replacement, surgical date 9/23/19.  [de-identified] : 64 year old female presents for third post op. She is doing well and reports that she feels that she is progressively improving. She reports slight, occasional left hip pain localized to the side of the hip. The pain occurs upon rising in the morning and after prolonged sitting. She also reports that sometimes when she lays on her side she feels a firmness around the left hip. She uses a tool to place shoes on the left foot. Patient can walk about 5 blocks unassisted and uses a cane for longer walks. She has difficulty using stairs. She can comfortably sit in an ordinary chair. She reports that she takes Aleve for pain. [de-identified] : Patient is alert and oriented x3.\par Leg lengths clinically equal.\par Using cane.\par Left hip: Well-healed surgical scar without erythema or ecchymosis. Acceptable post-op swelling. ROM from full extension to 100 degrees of flexion. 0 degrees of internal rotation. 50-55 degrees of external rotation. 55 degrees of abduction. 10 degrees of adduction. Flexor power 5/5.\par Calf is soft and nontender.\par NVI. [de-identified] : X-ray imaging of the left hip done here today shows a well-fixed left THR in overall good alignment. [de-identified] : Ms. Lai is doing well s/p left THR. I informed patient that the firmness she feels when lying on the left hip is probably due to swelling and should subside with time. Continue to increase activity as tolerated. She may take OTC NSAIDs prn.\par Follow up one year post surgery or PRN.

## 2019-12-11 NOTE — H&P ADULT - PROBLEM SELECTOR PLAN 2
- Continue supportive care with robitussin for cough as well as sodium chloride nasal spray for nasal congestion   - Patient encouraged to call or return if symptoms worsen or fail to resolve
continue home regimen

## 2019-12-22 ENCOUNTER — FORM ENCOUNTER (OUTPATIENT)
Age: 64
End: 2019-12-22

## 2020-01-01 NOTE — PROGRESS NOTE ADULT - PROVIDER SPECIALTY LIST ADULT
Internal Medicine
Orthopedics
2020 01:15

## 2020-05-28 ENCOUNTER — RESULT REVIEW (OUTPATIENT)
Age: 65
End: 2020-05-28

## 2020-05-28 ENCOUNTER — APPOINTMENT (OUTPATIENT)
Dept: ORTHOPEDIC SURGERY | Facility: CLINIC | Age: 65
End: 2020-05-28
Payer: MEDICAID

## 2020-05-28 ENCOUNTER — OUTPATIENT (OUTPATIENT)
Dept: OUTPATIENT SERVICES | Facility: HOSPITAL | Age: 65
LOS: 1 days | End: 2020-05-28
Payer: COMMERCIAL

## 2020-05-28 VITALS — TEMPERATURE: 94.7 F | WEIGHT: 220 LBS | BODY MASS INDEX: 37.1 KG/M2 | HEIGHT: 64.5 IN

## 2020-05-28 DIAGNOSIS — Z98.89 OTHER SPECIFIED POSTPROCEDURAL STATES: Chronic | ICD-10-CM

## 2020-05-28 DIAGNOSIS — Z96.641 PRESENCE OF RIGHT ARTIFICIAL HIP JOINT: Chronic | ICD-10-CM

## 2020-05-28 PROCEDURE — 73564 X-RAY EXAM KNEE 4 OR MORE: CPT

## 2020-05-28 PROCEDURE — 99214 OFFICE O/P EST MOD 30 MIN: CPT | Mod: 25

## 2020-05-28 PROCEDURE — 73564 X-RAY EXAM KNEE 4 OR MORE: CPT | Mod: 26,50

## 2020-05-28 PROCEDURE — 72170 X-RAY EXAM OF PELVIS: CPT | Mod: 26

## 2020-05-28 PROCEDURE — 20610 DRAIN/INJ JOINT/BURSA W/O US: CPT | Mod: RT

## 2020-05-28 PROCEDURE — 72170 X-RAY EXAM OF PELVIS: CPT

## 2020-05-28 NOTE — PROCEDURE
[Injection] : Injection [Bilateral] : of bilateral [Knee Joint] : knee joint [Osteoarthritis] : Osteoarthritis [Inflammation] : Inflammation [Joint Pain] : Joint pain [Patient] : patient [Alcohol] : Alcohol [Betadine] : Betadine [Ethyl Chloride Spray] : ethyl chloride spray was used as a topical anesthetic [Lateral] : lateral [Anterior] : anterior [1% Lidocaine___(mL)] : [unfilled] mL of 1% Lidocaine [20] : a 20-gauge [Triamcinolone 40mg/mL___(mL)] : [unfilled] ~UmL of 40mg/mL triamcinolone [Bandage Applied] : a bandage [None] : none [Tolerated Well] : The patient tolerated the procedure well

## 2020-05-28 NOTE — PHYSICAL EXAM
[de-identified] : General appearance: well nourished and hydrated, pleasant, alert and oriented x 3, cooperative.  Obese body habitus.\par HEENT: normocephalic, EOM intact, wearing mask, external auditory canal clear.  \par Cardiovascular: no lower leg edema, no varicosities, dorsalis pedis pulses palpable and symmetric.  \par Lymphatics: no palpable lymphadenopathy, no lymphedema.  \par Neurologic: sensation is normal, no muscle weakness in upper or lower extremities, patella tendon reflexes present and symmetric.  \par Dermatologic: skin moist, warm, no rash.  \par Spine: cervical spine with normal lordosis and painless range of motion, thoracic spine with normal kyphosis and painless range of motion, lumbosacral spine with normal lordosis and restricted uncomfortable range of motion.  No tenderness to palpation along midline spine and paraspinal musculature.  Sacroiliac joints nontender bilaterally.\par Gait: normal.  \par \par Left knee:\par - Inspection: difficult to assess swelling; negative ecchymosis and erythema.  \par - Wounds: none.  \par - Alignment: normal.  \par - Palpation: medial and lateral joint line, peripatellar tenderness on palpation.  \par - ROM active: 0-110, discomfort with deep flexion\par - Ligamentous laxity: negative Lachman, negative ant. drawer test, negative post. drawer test, negative pivot shift test, stable to varus stress, stable to valgus stress.  \par - Popliteal angle: 45 degrees\par - Muscle Test: 5/5 quad strength.  \par \par Right knee:\par - Inspection: difficult to assess swelling; negative ecchymosis and erythema.  \par - Wounds: none.  \par - Alignment: normal.  \par - Palpation: medial and lateral joint line, peripatellar tenderness on palpation.  \par - ROM active: 0-120, discomfort with deep flexion\par - Ligamentous laxity: negative Lachman, negative ant. drawer test, negative post. drawer test, negative pivot shift test, stable to varus stress, stable to valgus stress.  \par - Popliteal angle: 45 degrees\par - Muscle Test: 5/5 quad strength.  \par \par Limb lengths clinically equal; no contractures or deformity noted at hips, knees, or ankles\par \par Left hip:\par - Healed posterolateral incision and pelvic stab incisions\par - No swelling/ecchymosis\par - No specific tenderness on palpation\par - ROM: 90 flexion, 0 extension, 10 adduction, 40 abduction, 15 internal rotation, 75 external rotation\par - JOSE painless\par - FADIR painless\par - Desiree negative\par - Stinchfield negative\par - Flexor power 5/5\par - Abductor power 5/5\par \par Right hip:\par - Healed posterolateral incision and pelvic stab incisions\par - No swelling/ecchymosis\par - No specific tenderness on palpation\par - ROM: 90 flexion, 0 extension, 10 adduction, 40 abduction, 15 internal rotation, 75 external rotation\par - JOSE painless\par - FADIR painless\par - Desiree negative\par - Stinchfield negative\par - Flexor power 5/5\par - Abductor power 5/5 [de-identified] : AP pelvis and 4 views of the bilateral knees (weightbearing AP, weightbearing [place], weightbearing lateral, and [place]) were obtained today.\par \par Bilateral hips demonstrate cementless total hip arthroplasties in unchanged position as compared to earlier exams. Alignment is normal. No radiographic evidence of fracture, osteolysis, or loosening.\par \par Bilateral sacroiliac joints appear normal without arthrosis.\par \par The left knee demonstrates normal alignment in the coronal and sagittal planes. There is tricompartmental arthritis most pronounced in the medial and patellofemoral compartments, Kellgren-Capo 4. The patella sits at appropriate height and displays marked lateral tilt.\par \par The right knee demonstrates normal alignment in the coronal and sagittal planes. There is tricompartmental arthritis most pronounced in the medial and patellofemoral compartments, Kellgren-Capo 4. The patella sits at appropriate height and displays marked lateral tilt.

## 2020-05-28 NOTE — DISCUSSION/SUMMARY
[de-identified] : 63y/o female with bilateral knee osteoarthritis, s/p bilateral total hip replacements\par - Bilateral knee CSI applied as above\par - Order bilateral HA, begin once authorized - consider superolateral portal in future as inferolateral was somewhat difficult today\par - Refer for PT, encouraged HEP\par - Weight loss discussed; refer to nutritionist\par - RTC q3-6 months following HA for repeat injections

## 2020-05-28 NOTE — HISTORY OF PRESENT ILLNESS
[Intermit.] : ~He/She~ states the symptoms seem to be intermittent [5] : a current pain level of 5/10 [de-identified] : 63y/o female presenting for bilateral knee osteoarthritis. Also s/p bilateral JOVI (R Oct 2016, L Sept 2019 by Dr. Raygoza, robotic superior), functioning well without complaints. The knees have been a chronic issue for at least 3-4 years. Has been treating with periodic injections, Tylenol, Aleve, and daily ambulation. She sees Dr. Velazco for low back pain and is taking gabapentin as well. She has also been receiving lumbar spinal injections with some relief. She denies radiating leg pain, numbness, and weakness. Last series of injections were gel shots in 2018. Currently she is able to walk about 4-5 blocks without a cane before the knees stop her. THe left is worse than the right. She is retired. Symptoms are not to the point that she is ready to consider surgical treatment; she is interested in further injections. [de-identified] : describes it as constant moderate achy/sharp pain

## 2021-01-20 ENCOUNTER — APPOINTMENT (OUTPATIENT)
Dept: ORTHOPEDIC SURGERY | Facility: CLINIC | Age: 66
End: 2021-01-20
Payer: MEDICAID

## 2021-01-20 ENCOUNTER — OUTPATIENT (OUTPATIENT)
Dept: OUTPATIENT SERVICES | Facility: HOSPITAL | Age: 66
LOS: 1 days | End: 2021-01-20
Payer: COMMERCIAL

## 2021-01-20 ENCOUNTER — RESULT REVIEW (OUTPATIENT)
Age: 66
End: 2021-01-20

## 2021-01-20 VITALS — TEMPERATURE: 96.4 F

## 2021-01-20 DIAGNOSIS — Z98.89 OTHER SPECIFIED POSTPROCEDURAL STATES: Chronic | ICD-10-CM

## 2021-01-20 DIAGNOSIS — Z96.641 PRESENCE OF RIGHT ARTIFICIAL HIP JOINT: Chronic | ICD-10-CM

## 2021-01-20 PROCEDURE — 99072 ADDL SUPL MATRL&STAF TM PHE: CPT

## 2021-01-20 PROCEDURE — 72110 X-RAY EXAM L-2 SPINE 4/>VWS: CPT

## 2021-01-20 PROCEDURE — 99214 OFFICE O/P EST MOD 30 MIN: CPT

## 2021-01-20 PROCEDURE — 72110 X-RAY EXAM L-2 SPINE 4/>VWS: CPT | Mod: 26

## 2021-01-22 NOTE — DISCUSSION/SUMMARY
[de-identified] : Discussed the results of the patient's history, physical exam, and imaging. Ms. Lai continues to suffer from low back and right buttock pain. She has a significant spondylolisthesis on xray today.  On prior MRI lumbar had severe stenosis at this segment.   As imaging now over 1 year old, recommending updated MRI lumbar without contrast, order given. The patient will follow up with me after imaging is completed, sooner if there is an issue.  All questions were answered.\par \par \par \par \par

## 2021-01-22 NOTE — HISTORY OF PRESENT ILLNESS
[de-identified] : Follow up 1/20/21: Patient continues to have low back and right buttock pain. Rates as 7/10 severity. Patient states is limited in distance she can ambulate, has difficulty sleeping due to pain. Sees Dr Goetz for pain management, last LISETTE was last summer with 2-3 weeks of significant relief of pain. Denies new neurologic symptoms.\par \par Follow Up 8/20/19: Patient continues to have low back and buttock pain. Denies new neurologic symptoms. Has seen Dr. Goetz for pain management. Has left THR scheduled with Dr. Raygoza 9/23/19.\par \par Follow Up 10/29/18: Patient continues to have right sided low back pain that radiates to her right buttock. Pain is mild/moderate in severity and unchanged since last visit 1 month ago. Denies new neurologic symptoms. Denies bowel/bladder symptoms. Here to discuss MRI lumbar. \par \par Initial 9/26/18: Ms. VALERO is a very pleasant 62 year old female who complains of right sided low back pain for 1 year, atraumatic. On initial presentation symptoms were as follows: Patient described the pain as intermittent. Patient rated the pain as 2/10 today, average 2/10 this week, 5/10 at its worst. The pain localized to the right side of the lower back and radiates to her right buttock. Patient denies extremity numbness and paresthesias. The pain is relieved by lying down and sitting. The pain is worsened by activity. Patient currently taking ibuprofen and tylenol as needed, with moderate temporary relief. The patient has not had physical therapy for her low back or steroid injections. Patient had right JOVI with Dr. Raygoza 10/2016 with complete relief of right hip pain at at that time. Low back and buttock pain onset about 1 year after JOVI.\par \par The patient reports no loss of hand dexterity.\par The patient states there no loss of balance when walking.\par There no sensory loss in the arms or legs\par The patent no difficulty with urination.\par \par The patient no history of previous spine surgery.\par The patient no previous spine consultation.\par \par Pain:\par Back 50 Right Leg 50 Left Leg 0\par \par The patient has no history of unexpected weight loss, no history of active cancer, no history bladder or bowel dysfunction, no night pain, no fevers or chills.\par \par The past medical history, surgical history, family history, allergies, medications, review of systems, family history and social history were reviewed and non contributory.

## 2021-01-22 NOTE — PHYSICAL EXAM
[de-identified] : Spine: Spine: General: patient is well developed, well nourished, in no acute \par distress, alert and oriented x 3. \par \par Mood and affect: normal\par \par Respiratory: no respiratory distress noted\par \par Skin: no scars over spine, skin intact, no erythema, increased warmth\par \par Alignment:The spine is well compensated in the coronal and sagittal plane. There is no asymmetry on the perry forward bend test\par \par Gait: The patient is able to toe walk and heel walk without difficulty. The patient is able to tandem gait without difficulty.\par \par Palpation: no tenderness to palpation spine or paraspinal region\par \par Range of motion: Lumbar spine ROM is restricted\par \par Neurologic Exam:\par Motor: Manual Muscle testing in the lower extremities is 5 out of 5 in all muscle groups. There is no evidence of muscular atrophy in the lower extremities. Sensory: Sensation to light touch is grossly intact in the lower extremities\par \par Reflexes: DTR are present and symmetric throughout\par \par Hip Exam: No pain with internal or external rotation of bilateral hips\par \par Special tests: Straight leg raise test negative. Cross straight leg test negative. JOSE test negative\par \par Vascular: Examination of the peripheral vascular system demonstrates no evidence of congestion or edema. no evidence of lymphedema bilateral lower extremities, pulses are present and symmetric in both lower extremities.\par   [de-identified] : XR 1/20/21 (my read): 15mm grade 2 anterolisthesis L4 on L5 without instability on flex/ext; moderate multilevel degenerative changes, no fractures\par \par MRI lumbar 12/2019 [facility] (my read): grade 1/2 anterolisthesis L4 on L5, disc bulge and posterior element hypertrophy resulting in severe central canal stenosis, moderate bilateral foraminal stenosis; L5/S1 central disc herniation\par \par XR thoracolumbar 9/26/18: 9mm L4/5 anterolisthesis without instability on flex/ext; multilevel moderate degenerative changes; moderate osteoarthritis left hip; no acute fractures. \par \par MRI lumbar 10/8/18: L4/5 moderate/severe central canal stenosis, moderate/severe bilateral foraminal stenosis, L4 on L5 anterolisthesis; multilevel degenerative changes with mild central canal stenosis, mild/moderate bilateral foraminal stenosis

## 2021-02-02 ENCOUNTER — APPOINTMENT (OUTPATIENT)
Dept: ORTHOPEDIC SURGERY | Facility: CLINIC | Age: 66
End: 2021-02-02

## 2021-02-04 ENCOUNTER — APPOINTMENT (OUTPATIENT)
Dept: ORTHOPEDIC SURGERY | Facility: CLINIC | Age: 66
End: 2021-02-04
Payer: MEDICAID

## 2021-02-04 VITALS — BODY MASS INDEX: 38.79 KG/M2 | HEIGHT: 64.5 IN | WEIGHT: 230 LBS

## 2021-02-04 VITALS — OXYGEN SATURATION: 98 % | HEART RATE: 90 BPM | DIASTOLIC BLOOD PRESSURE: 80 MMHG | SYSTOLIC BLOOD PRESSURE: 122 MMHG

## 2021-02-04 PROCEDURE — 99072 ADDL SUPL MATRL&STAF TM PHE: CPT

## 2021-02-04 PROCEDURE — 99213 OFFICE O/P EST LOW 20 MIN: CPT | Mod: 25

## 2021-02-04 PROCEDURE — 20610 DRAIN/INJ JOINT/BURSA W/O US: CPT | Mod: RT

## 2021-02-04 NOTE — DISCUSSION/SUMMARY
[de-identified] : 66y/o female with bilateral knee osteoarthritis, bilateral well-functioning total hip replacements\par - CSI administered today to both knees\par - New referral for PT\par - HEP encouraged\par - Tylenol + NSAIDs as needed for pain\par - Reorder HA; she is aware that her insurer is unlikely to cover. We also discussed Visco-3 as a possibility\par - RTC q3+mo as needed for repeat injections or earlier if she would like to do the Visco-3. We reviewed again that bilateral TKA will likely be needed for definitive treatment down the line, though she doesn't feel that it is necessary at this time.\par \par Note: her inferolateral knee portals are about 1cm distal to the typical site.

## 2021-02-04 NOTE — PHYSICAL EXAM
[de-identified] : General appearance: well nourished and hydrated, pleasant, alert and oriented x 3, cooperative. Obese body habitus.\par HEENT: normocephalic, EOM intact, wearing mask, external auditory canal clear. \par Cardiovascular: no lower leg edema, no varicosities, dorsalis pedis pulses palpable and symmetric. \par Lymphatics: no palpable lymphadenopathy, no lymphedema. \par Neurologic: sensation is normal, no muscle weakness in upper or lower extremities, patella tendon reflexes present and symmetric. \par Dermatologic: skin moist, warm, no rash. \par Spine: cervical spine with normal lordosis and painless range of motion, thoracic spine with normal kyphosis and painless range of motion, lumbosacral spine with normal lordosis and restricted uncomfortable range of motion. \par Gait: slow to stand and get started. Bilaterally antalgic with hunched forward posture.\par \par Left knee:\par - Inspection: difficult to assess swelling; negative ecchymosis and erythema. \par - Wounds: none. \par - Alignment: normal. \par - Palpation: medial and lateral joint line, peripatellar tenderness on palpation. \par - ROM active: 0-125, discomfort with deep flexion\par - Ligamentous laxity: negative Lachman, negative ant. drawer test, negative post. drawer test, negative pivot shift test, stable to varus stress, stable to valgus stress. \par - Popliteal angle: 45 degrees\par - Muscle Test: 5/5 quad strength. \par \par Right knee:\par - Inspection: difficult to assess swelling; negative ecchymosis and erythema. \par - Wounds: none. \par - Alignment: normal. \par - Palpation: medial and lateral joint line, peripatellar tenderness on palpation. \par - ROM active: 0-125, discomfort with deep flexion\par - Ligamentous laxity: negative Lachman, negative ant. drawer test, negative post. drawer test, negative pivot shift test, stable to varus stress, stable to valgus stress. \par - Popliteal angle: 45 degrees\par - Muscle Test: 5/5 quad strength. \par \par Limb lengths clinically equal; no contractures or deformity noted at hips, knees, or ankles\par \par Left hip:\par - Healed posterolateral incision and pelvic stab incisions\par - No swelling/ecchymosis\par - No specific tenderness on palpation\par - ROM: 90 flexion, 0 extension, 10 adduction, 40 abduction, 15 internal rotation, 75 external rotation\par - JOSE painless\par - FADIR painless\par - Desiree negative\par - Stinchfield negative\par - Flexor power 5/5\par - Abductor power 5/5\par \par Right hip:\par - Healed posterolateral incision and pelvic stab incisions\par - No swelling/ecchymosis\par - No specific tenderness on palpation\par - ROM: 90 flexion, 0 extension, 10 adduction, 40 abduction, 15 internal rotation, 75 external rotation\par - JOSE painless\par - FADIR painless\par - Desiree negative\par - Stinchfield negative\par - Flexor power 5/5\par - Abductor power 5/5

## 2021-02-04 NOTE — PROCEDURE
[Injection] : Injection [Bilateral] : of bilateral [Knee Joint] : knee joint [Osteoarthritis] : Osteoarthritis [Patient] : patient [Alcohol] : Alcohol [Betadine] : Betadine [Ethyl Chloride Spray] : ethyl chloride spray was used as a topical anesthetic [Lateral] : lateral [Anterior] : anterior [20] : a 20-gauge [1% Lidocaine___(mL)] : [unfilled] mL of 1% Lidocaine [Triamcinolone 40mg/mL___(mL)] : [unfilled] ~UmL of 40mg/mL triamcinolone [Bandage Applied] : a bandage [Tolerated Well] : The patient tolerated the procedure well [None] : none

## 2021-02-04 NOTE — HISTORY OF PRESENT ILLNESS
[de-identified] : 64y/o female following up for bilateral knee osteoarthritis. Also s/p bilateral JOVI (R Oct 2016, L Sept 2019 by Dr. Raygoza, robotic superior). The bilateral knee CSI from May was very effective but has recently begun to wear off, and she would like to repeat them today. The HA was never authorized. She does recall having had a good response to HA in the past, but this was under a difference insurance plan. The hips remain fine. She is having increasing problems relating to her low back and is planning to follow up with Dr. Velazco for possible surgical discussion. She is not currently exercising at all and would be open to getting back into PT.

## 2021-02-10 ENCOUNTER — APPOINTMENT (OUTPATIENT)
Dept: ORTHOPEDIC SURGERY | Facility: CLINIC | Age: 66
End: 2021-02-10
Payer: MEDICAID

## 2021-02-10 VITALS — TEMPERATURE: 96.9 F

## 2021-02-10 DIAGNOSIS — Z01.818 ENCOUNTER FOR OTHER PREPROCEDURAL EXAMINATION: ICD-10-CM

## 2021-02-10 DIAGNOSIS — M54.41 LUMBAGO WITH SCIATICA, RIGHT SIDE: ICD-10-CM

## 2021-02-10 DIAGNOSIS — M48.061 SPINAL STENOSIS, LUMBAR REGION WITHOUT NEUROGENIC CLAUDICATION: ICD-10-CM

## 2021-02-10 DIAGNOSIS — G89.29 LUMBAGO WITH SCIATICA, RIGHT SIDE: ICD-10-CM

## 2021-02-10 PROCEDURE — 99072 ADDL SUPL MATRL&STAF TM PHE: CPT

## 2021-02-10 PROCEDURE — 99215 OFFICE O/P EST HI 40 MIN: CPT

## 2021-02-10 NOTE — HISTORY OF PRESENT ILLNESS
[de-identified] : Follow up 2/10/21: Patient presents for follow up. Continues to have low back and right buttock pain.  Patient states is limited in distance she can ambulate, has difficulty sleeping due to pain. Denies new neurologic symptoms. Completed MRI lumbar, has CT lumbar scheduled. \par \par Follow up 1/20/21: Patient continues to have low back and right buttock pain. Rates as 7/10 severity. Patient states is limited in distance she can ambulate, has difficulty sleeping due to pain. Sees Dr Goetz for pain management, last LISETTE was last summer with 2-3 weeks of significant relief of pain. Denies new neurologic symptoms.\par \par Follow Up 8/20/19: Patient continues to have low back and buttock pain. Denies new neurologic symptoms. Has seen Dr. Goetz for pain management. Has left THR scheduled with Dr. Raygoza 9/23/19.\par \par Follow Up 10/29/18: Patient continues to have right sided low back pain that radiates to her right buttock. Pain is mild/moderate in severity and unchanged since last visit 1 month ago. Denies new neurologic symptoms. Denies bowel/bladder symptoms. Here to discuss MRI lumbar. \par \par Initial 9/26/18: Ms. VALERO is a very pleasant 62 year old female who complains of right sided low back pain for 1 year, atraumatic. On initial presentation symptoms were as follows: Patient described the pain as intermittent. Patient rated the pain as 2/10 today, average 2/10 this week, 5/10 at its worst. The pain localized to the right side of the lower back and radiates to her right buttock. Patient denies extremity numbness and paresthesias. The pain is relieved by lying down and sitting. The pain is worsened by activity. Patient currently taking ibuprofen and tylenol as needed, with moderate temporary relief. The patient has not had physical therapy for her low back or steroid injections. Patient had right JOVI with Dr. Raygoza 10/2016 with complete relief of right hip pain at at that time. Low back and buttock pain onset about 1 year after JOVI.\par \par The patient reports no loss of hand dexterity.\par The patient states there no loss of balance when walking.\par There no sensory loss in the arms or legs\par The patent no difficulty with urination.\par \par The patient no history of previous spine surgery.\par The patient no previous spine consultation.\par \par Pain:\par Back 50 Right Leg 50 Left Leg 0\par \par The patient has no history of unexpected weight loss, no history of active cancer, no history bladder or bowel dysfunction, no night pain, no fevers or chills.\par \par The past medical history, surgical history, family history, allergies, medications, review of systems, family history and social history were reviewed and non contributory.

## 2021-02-10 NOTE — PHYSICAL EXAM
[de-identified] : Spine: Spine: General: patient is well developed, well nourished, in no acute \par distress, alert and oriented x 3. \par \par Mood and affect: normal\par \par Respiratory: no respiratory distress noted\par \par Skin: no scars over spine, skin intact, no erythema, increased warmth\par \par Alignment:The spine is well compensated in the coronal and sagittal plane. There is no asymmetry on the perry forward bend test\par \par Gait: The patient is able to toe walk and heel walk without difficulty. The patient is able to tandem gait without difficulty.\par \par Palpation: no tenderness to palpation spine or paraspinal region\par \par Range of motion: Lumbar spine ROM is restricted\par \par Neurologic Exam:\par Motor: Manual Muscle testing in the lower extremities is 5 out of 5 in all muscle groups. There is no evidence of muscular atrophy in the lower extremities. Sensory: Sensation to light touch is grossly intact in the lower extremities\par \par Reflexes: DTR are present and symmetric throughout\par \par Hip Exam: No pain with internal or external rotation of bilateral hips\par \par Special tests: Straight leg raise test negative. Cross straight leg test negative. JOSE test negative\par \par Vascular: Examination of the peripheral vascular system demonstrates no evidence of congestion or edema. no evidence of lymphedema bilateral lower extremities, pulses are present and symmetric in both lower extremities.\par   [de-identified] : MRI lumbar 2/2021 (my read): L4/L5 grade 1 anterolisthesis, disc bulge and posterior element hypertrophy resulting in severe central canal stenosis, moderate bilateral foraminal stenosis; L5/S1 disc bulge without significant stenosis\par \par XR 1/20/21: 15mm grade 2 anterolisthesis L4 on L5 without instability on flex/ext; moderate multilevel degenerative changes, no fractures\par \par MRI lumbar 12/2019 [facility] (my read): grade 1/2 anterolisthesis L4 on L5, disc bulge and posterior element hypertrophy resulting in severe central canal stenosis, moderate bilateral foraminal stenosis; L5/S1 central disc herniation\par \par XR thoracolumbar 9/26/18: 9mm L4/5 anterolisthesis without instability on flex/ext; multilevel moderate degenerative changes; moderate osteoarthritis left hip; no acute fractures. \par \par MRI lumbar 10/8/18: L4/5 moderate/severe central canal stenosis, moderate/severe bilateral foraminal stenosis, L4 on L5 anterolisthesis; multilevel degenerative changes with mild central canal stenosis, mild/moderate bilateral foraminal stenosis

## 2021-02-10 NOTE — DISCUSSION/SUMMARY
[de-identified] : Discussed the results of the patient's history, physical exam, and imaging. Ms. Lai continues to suffer from severe disabling low back and lower extremity radiating pains.  Patient with grade 2 spondylolisthesis of L4/5 (reduces to grade 1 on supine imaging) with severe stenosis, grade 1 unstable spondylolisthesis of L3/4 with facet diastasis and effusions.  Discussed options including operative and non operative.  Patient has failed extensive non operative treatment and wishes to proceed with surgical management.  Risks/benefits/alternatives discussed at length, patient asked cogent questions, which were answered, and demonstrated understanding.  Recommend L4/5 ALIF with L3-5 laminectomy, facetectomies, foraminotomies, instrumentation, PSF, navigation.  Needs CT lumbar preop along with medical workup.  Will be scheduled appropriately.\par

## 2021-03-19 ENCOUNTER — APPOINTMENT (OUTPATIENT)
Dept: VASCULAR SURGERY | Facility: CLINIC | Age: 66
End: 2021-03-19
Payer: MEDICAID

## 2021-03-19 PROCEDURE — 99203 OFFICE O/P NEW LOW 30 MIN: CPT

## 2021-03-19 PROCEDURE — 99072 ADDL SUPL MATRL&STAF TM PHE: CPT

## 2021-03-19 NOTE — ASSESSMENT
[FreeTextEntry1] : 66 yo female significant history of back issues. Followed by Dr. Velazco who recommends L4/L5 ALIF with L3-L5 laminectomy, facetectomies, foraminotomies, instrumentation, PSF, navigation. We will be participating in surgery and patient is here to establish care and discuss surgical planning. \par \par Discussed surgery at great length, will be available to assist in surgery with Dr. Velazco.

## 2021-03-19 NOTE — HISTORY OF PRESENT ILLNESS
[FreeTextEntry1] : 66 yo female significant history of back issues. Followed by Dr. Velazco who recommends L4/L5 ALIF with L3-L5 laminectomy, facetectomies, foraminotomies, instrumentation, PSF, navigation. We will be participating in surgery and patient is here to establish care and discuss surgical planning.

## 2021-03-19 NOTE — PHYSICAL EXAM
[2+] : left 2+ [Alert] : alert [Oriented to Person] : oriented to person [Oriented to Place] : oriented to place [Oriented to Time] : oriented to time [Calm] : calm [Ankle Swelling (On Exam)] : not present [Varicose Veins Of Lower Extremities] : not present [] : not present [de-identified] : well appearing

## 2021-03-22 ENCOUNTER — RESULT REVIEW (OUTPATIENT)
Age: 66
End: 2021-03-22

## 2021-03-22 ENCOUNTER — OUTPATIENT (OUTPATIENT)
Dept: OUTPATIENT SERVICES | Facility: HOSPITAL | Age: 66
LOS: 1 days | End: 2021-03-22
Payer: COMMERCIAL

## 2021-03-22 DIAGNOSIS — Z98.89 OTHER SPECIFIED POSTPROCEDURAL STATES: Chronic | ICD-10-CM

## 2021-03-22 DIAGNOSIS — Z96.641 PRESENCE OF RIGHT ARTIFICIAL HIP JOINT: Chronic | ICD-10-CM

## 2021-03-22 PROCEDURE — 71046 X-RAY EXAM CHEST 2 VIEWS: CPT | Mod: 26

## 2021-03-22 PROCEDURE — 71046 X-RAY EXAM CHEST 2 VIEWS: CPT

## 2021-05-03 ENCOUNTER — LABORATORY RESULT (OUTPATIENT)
Age: 66
End: 2021-05-03

## 2021-05-03 ENCOUNTER — OUTPATIENT (OUTPATIENT)
Dept: OUTPATIENT SERVICES | Facility: HOSPITAL | Age: 66
LOS: 1 days | End: 2021-05-03
Payer: COMMERCIAL

## 2021-05-03 DIAGNOSIS — Z98.89 OTHER SPECIFIED POSTPROCEDURAL STATES: Chronic | ICD-10-CM

## 2021-05-03 DIAGNOSIS — Z01.818 ENCOUNTER FOR OTHER PREPROCEDURAL EXAMINATION: ICD-10-CM

## 2021-05-03 DIAGNOSIS — Z96.641 PRESENCE OF RIGHT ARTIFICIAL HIP JOINT: Chronic | ICD-10-CM

## 2021-05-03 LAB
APPEARANCE UR: CLEAR — SIGNIFICANT CHANGE UP
BILIRUB UR-MCNC: NEGATIVE — SIGNIFICANT CHANGE UP
COLOR SPEC: YELLOW — SIGNIFICANT CHANGE UP
DIFF PNL FLD: NEGATIVE — SIGNIFICANT CHANGE UP
GLUCOSE UR QL: NEGATIVE — SIGNIFICANT CHANGE UP
KETONES UR-MCNC: NEGATIVE — SIGNIFICANT CHANGE UP
LEUKOCYTE ESTERASE UR-ACNC: ABNORMAL
NITRITE UR-MCNC: NEGATIVE — SIGNIFICANT CHANGE UP
PH UR: 7 — SIGNIFICANT CHANGE UP (ref 5–8)
PROT UR-MCNC: NEGATIVE MG/DL — SIGNIFICANT CHANGE UP
SP GR SPEC: 1.02 — SIGNIFICANT CHANGE UP (ref 1–1.03)
UROBILINOGEN FLD QL: 0.2 E.U./DL — SIGNIFICANT CHANGE UP

## 2021-05-03 PROCEDURE — 81001 URINALYSIS AUTO W/SCOPE: CPT

## 2021-05-03 PROCEDURE — 87086 URINE CULTURE/COLONY COUNT: CPT

## 2021-05-04 LAB
CULTURE RESULTS: NO GROWTH — SIGNIFICANT CHANGE UP
SPECIMEN SOURCE: SIGNIFICANT CHANGE UP

## 2021-05-05 ENCOUNTER — TRANSCRIPTION ENCOUNTER (OUTPATIENT)
Age: 66
End: 2021-05-05

## 2021-05-05 VITALS
SYSTOLIC BLOOD PRESSURE: 113 MMHG | HEIGHT: 65 IN | DIASTOLIC BLOOD PRESSURE: 77 MMHG | HEART RATE: 88 BPM | OXYGEN SATURATION: 96 % | RESPIRATION RATE: 16 BRPM | TEMPERATURE: 98 F | WEIGHT: 241.63 LBS

## 2021-05-05 RX ORDER — POVIDONE-IODINE 5 %
1 AEROSOL (ML) TOPICAL ONCE
Refills: 0 | Status: DISCONTINUED | OUTPATIENT
Start: 2021-05-06 | End: 2021-05-06

## 2021-05-05 NOTE — H&P ADULT - HISTORY OF PRESENT ILLNESS
65F c/o low back pain x       Present for Stage 1: ALIF L4-L5. Stage 2: Laminectomy/PSF L3-L5  65F c/o low back pain x >1 year. She reports pain in the low back that radiates to the right buttock. Denies radiating pain down the leg. Reports symptoms are worse in the AM. She has a history of treatment including Gabapentin, Aleve, hot compress, PT, without significant symptom relief. She has used a cane intermittently.    No history of DM2, stroke, MI or DVT.    History of bilateral hip replacements without complication.     Present for Stage 1: ALIF L4-L5. Stage 2: Laminectomy/PSF L3-L5

## 2021-05-05 NOTE — H&P ADULT - ASSESSMENT
65F with lumbar spine spondylolisthesis  65F with low back pain secondary to lumbar spondylolisthesis

## 2021-05-05 NOTE — H&P ADULT - NSHPPHYSICALEXAM_GEN_ALL_CORE
MSK + Decreased ROM 2/2 pain lumbar spine       Remainder of exam per medical clearance note 64 y/o female, NAD  MSK + Decreased ROM 2/2 pain lumbar spine   Calves soft, nontender bilaterally   Sensation intact to light touch bilateral extremities  DP 2+, brisk capillary refill  EHL/FHL/TA/GS 5/5 bilaterally     Rest of PE per MD clearance

## 2021-05-05 NOTE — H&P ADULT - NSHPLABSRESULTS_GEN_ALL_CORE
Covid Negative 5/4/21  3M ordered Covid Negative 5/4/21  3M ordered  Preop CBC, BMP, PT/PTT/INR, UA - WNL per medical clearance   Cr .85   UA contaminated - repeated  5/4  - Neg   Preop EKG - sinus rhythm - WNL per medical clearance   Preop CXR - WNL per medical clearance   Echo 4/6/21 - WNL , LV size normal

## 2021-05-05 NOTE — H&P ADULT - PROBLEM SELECTOR PLAN 1
Admit to Orthopaedic Service.  Presents today for elective ALIF L4-L5, PSF L3-L5  Pt medically stable and cleared for procedure today by  Admit to Orthopaedic Service.  Presents today for elective ALIF L4-L5, PSF L3-L5  Pt medically stable and cleared for procedure today by Dr. Vásquez and Dr. Hutson

## 2021-05-06 ENCOUNTER — INPATIENT (INPATIENT)
Facility: HOSPITAL | Age: 66
LOS: 11 days | Discharge: HOME HEALTH | DRG: 454 | End: 2021-05-18
Attending: ORTHOPAEDIC SURGERY | Admitting: ORTHOPAEDIC SURGERY
Payer: COMMERCIAL

## 2021-05-06 ENCOUNTER — APPOINTMENT (OUTPATIENT)
Dept: ORTHOPEDIC SURGERY | Facility: HOSPITAL | Age: 66
End: 2021-05-06

## 2021-05-06 DIAGNOSIS — M43.10 SPONDYLOLISTHESIS, SITE UNSPECIFIED: ICD-10-CM

## 2021-05-06 DIAGNOSIS — E78.5 HYPERLIPIDEMIA, UNSPECIFIED: ICD-10-CM

## 2021-05-06 DIAGNOSIS — Z96.641 PRESENCE OF RIGHT ARTIFICIAL HIP JOINT: Chronic | ICD-10-CM

## 2021-05-06 DIAGNOSIS — Z98.89 OTHER SPECIFIED POSTPROCEDURAL STATES: Chronic | ICD-10-CM

## 2021-05-06 DIAGNOSIS — I10 ESSENTIAL (PRIMARY) HYPERTENSION: ICD-10-CM

## 2021-05-06 DIAGNOSIS — E66.9 OBESITY, UNSPECIFIED: ICD-10-CM

## 2021-05-06 DIAGNOSIS — J45.909 UNSPECIFIED ASTHMA, UNCOMPLICATED: ICD-10-CM

## 2021-05-06 LAB
ALBUMIN SERPL ELPH-MCNC: 3.3 G/DL — SIGNIFICANT CHANGE UP (ref 3.3–5)
ALP SERPL-CCNC: 117 U/L — SIGNIFICANT CHANGE UP (ref 40–120)
ALT FLD-CCNC: 23 U/L — SIGNIFICANT CHANGE UP (ref 10–45)
ANION GAP SERPL CALC-SCNC: 8 MMOL/L — SIGNIFICANT CHANGE UP (ref 5–17)
AST SERPL-CCNC: 25 U/L — SIGNIFICANT CHANGE UP (ref 10–40)
BASE EXCESS BLDA CALC-SCNC: -1.8 MMOL/L — SIGNIFICANT CHANGE UP (ref -2–3)
BASOPHILS # BLD AUTO: 0.02 K/UL — SIGNIFICANT CHANGE UP (ref 0–0.2)
BASOPHILS NFR BLD AUTO: 0.2 % — SIGNIFICANT CHANGE UP (ref 0–2)
BILIRUB SERPL-MCNC: 0.8 MG/DL — SIGNIFICANT CHANGE UP (ref 0.2–1.2)
BUN SERPL-MCNC: 17 MG/DL — SIGNIFICANT CHANGE UP (ref 7–23)
CALCIUM SERPL-MCNC: 8.4 MG/DL — SIGNIFICANT CHANGE UP (ref 8.4–10.5)
CHLORIDE SERPL-SCNC: 108 MMOL/L — SIGNIFICANT CHANGE UP (ref 96–108)
CO2 BLDA-SCNC: 24 MMOL/L — SIGNIFICANT CHANGE UP (ref 19–24)
CO2 SERPL-SCNC: 26 MMOL/L — SIGNIFICANT CHANGE UP (ref 22–31)
CREAT SERPL-MCNC: 0.7 MG/DL — SIGNIFICANT CHANGE UP (ref 0.5–1.3)
EOSINOPHIL # BLD AUTO: 0.03 K/UL — SIGNIFICANT CHANGE UP (ref 0–0.5)
EOSINOPHIL NFR BLD AUTO: 0.3 % — SIGNIFICANT CHANGE UP (ref 0–6)
FIBRINOGEN PPP-MCNC: 371 MG/DL — SIGNIFICANT CHANGE UP (ref 258–438)
GAS PNL BLDA: SIGNIFICANT CHANGE UP
GLUCOSE SERPL-MCNC: 111 MG/DL — HIGH (ref 70–99)
HCO3 BLDA-SCNC: 23 MMOL/L — SIGNIFICANT CHANGE UP (ref 21–28)
HCT VFR BLD CALC: 31 % — LOW (ref 34.5–45)
HGB BLD-MCNC: 10.1 G/DL — LOW (ref 11.5–15.5)
IMM GRANULOCYTES NFR BLD AUTO: 0.6 % — SIGNIFICANT CHANGE UP (ref 0–1.5)
INR BLD: 1.18 — HIGH (ref 0.88–1.16)
LACTATE SERPL-SCNC: 0.8 MMOL/L — SIGNIFICANT CHANGE UP (ref 0.5–2)
LYMPHOCYTES # BLD AUTO: 0.96 K/UL — LOW (ref 1–3.3)
LYMPHOCYTES # BLD AUTO: 8.1 % — LOW (ref 13–44)
MAGNESIUM SERPL-MCNC: 1.7 MG/DL — SIGNIFICANT CHANGE UP (ref 1.6–2.6)
MCHC RBC-ENTMCNC: 31.1 PG — SIGNIFICANT CHANGE UP (ref 27–34)
MCHC RBC-ENTMCNC: 32.6 GM/DL — SIGNIFICANT CHANGE UP (ref 32–36)
MCV RBC AUTO: 95.4 FL — SIGNIFICANT CHANGE UP (ref 80–100)
MONOCYTES # BLD AUTO: 0.27 K/UL — SIGNIFICANT CHANGE UP (ref 0–0.9)
MONOCYTES NFR BLD AUTO: 2.3 % — SIGNIFICANT CHANGE UP (ref 2–14)
NEUTROPHILS # BLD AUTO: 10.49 K/UL — HIGH (ref 1.8–7.4)
NEUTROPHILS NFR BLD AUTO: 88.5 % — HIGH (ref 43–77)
NRBC # BLD: 0 /100 WBCS — SIGNIFICANT CHANGE UP (ref 0–0)
PCO2 BLDA: 39 MMHG — HIGH (ref 32–35)
PH BLDA: 7.38 — SIGNIFICANT CHANGE UP (ref 7.35–7.45)
PHOSPHATE SERPL-MCNC: 2.8 MG/DL — SIGNIFICANT CHANGE UP (ref 2.5–4.5)
PLATELET # BLD AUTO: 171 K/UL — SIGNIFICANT CHANGE UP (ref 150–400)
PO2 BLDA: 193 MMHG — HIGH (ref 83–108)
POTASSIUM SERPL-MCNC: 3.7 MMOL/L — SIGNIFICANT CHANGE UP (ref 3.5–5.3)
POTASSIUM SERPL-SCNC: 3.7 MMOL/L — SIGNIFICANT CHANGE UP (ref 3.5–5.3)
PROT SERPL-MCNC: 5.9 G/DL — LOW (ref 6–8.3)
PROTHROM AB SERPL-ACNC: 14.1 SEC — HIGH (ref 10.6–13.6)
RBC # BLD: 3.25 M/UL — LOW (ref 3.8–5.2)
RBC # FLD: 14.5 % — SIGNIFICANT CHANGE UP (ref 10.3–14.5)
SAO2 % BLDA: 99.6 % — HIGH (ref 94–98)
SARS-COV-2 RNA SPEC QL NAA+PROBE: SIGNIFICANT CHANGE UP
SODIUM SERPL-SCNC: 142 MMOL/L — SIGNIFICANT CHANGE UP (ref 135–145)
WBC # BLD: 11.84 K/UL — HIGH (ref 3.8–10.5)
WBC # FLD AUTO: 11.84 K/UL — HIGH (ref 3.8–10.5)

## 2021-05-06 PROCEDURE — 22853 INSJ BIOMECHANICAL DEVICE: CPT

## 2021-05-06 PROCEDURE — 22845 INSERT SPINE FIXATION DEVICE: CPT | Mod: 59

## 2021-05-06 PROCEDURE — 22558 ARTHRD ANT NTRBD MIN DSC LUM: CPT | Mod: 62

## 2021-05-06 PROCEDURE — 22558 ARTHRD ANT NTRBD MIN DSC LUM: CPT | Mod: GC,62

## 2021-05-06 PROCEDURE — 71275 CT ANGIOGRAPHY CHEST: CPT | Mod: 26

## 2021-05-06 PROCEDURE — 71045 X-RAY EXAM CHEST 1 VIEW: CPT | Mod: 26

## 2021-05-06 PROCEDURE — 99222 1ST HOSP IP/OBS MODERATE 55: CPT | Mod: GC

## 2021-05-06 PROCEDURE — 93306 TTE W/DOPPLER COMPLETE: CPT | Mod: 26

## 2021-05-06 PROCEDURE — 93010 ELECTROCARDIOGRAM REPORT: CPT

## 2021-05-06 RX ORDER — ACETAMINOPHEN 500 MG
1000 TABLET ORAL ONCE
Refills: 0 | Status: COMPLETED | OUTPATIENT
Start: 2021-05-06 | End: 2021-05-06

## 2021-05-06 RX ORDER — APREPITANT 80 MG/1
40 CAPSULE ORAL ONCE
Refills: 0 | Status: COMPLETED | OUTPATIENT
Start: 2021-05-06 | End: 2021-05-06

## 2021-05-06 RX ORDER — BUPIVACAINE 13.3 MG/ML
20 INJECTION, SUSPENSION, LIPOSOMAL INFILTRATION ONCE
Refills: 0 | Status: DISCONTINUED | OUTPATIENT
Start: 2021-05-06 | End: 2021-05-06

## 2021-05-06 RX ORDER — PROPOFOL 10 MG/ML
5 INJECTION, EMULSION INTRAVENOUS
Qty: 1000 | Refills: 0 | Status: DISCONTINUED | OUTPATIENT
Start: 2021-05-06 | End: 2021-05-06

## 2021-05-06 RX ORDER — SODIUM CHLORIDE 9 MG/ML
1000 INJECTION, SOLUTION INTRAVENOUS
Refills: 0 | Status: DISCONTINUED | OUTPATIENT
Start: 2021-05-06 | End: 2021-05-09

## 2021-05-06 RX ORDER — CHLORHEXIDINE GLUCONATE 213 G/1000ML
1 SOLUTION TOPICAL ONCE
Refills: 0 | Status: DISCONTINUED | OUTPATIENT
Start: 2021-05-06 | End: 2021-05-06

## 2021-05-06 RX ORDER — SODIUM CHLORIDE 9 MG/ML
1000 INJECTION, SOLUTION INTRAVENOUS
Refills: 0 | Status: DISCONTINUED | OUTPATIENT
Start: 2021-05-06 | End: 2021-05-07

## 2021-05-06 RX ORDER — DEXTROSE 50 % IN WATER 50 %
15 SYRINGE (ML) INTRAVENOUS ONCE
Refills: 0 | Status: DISCONTINUED | OUTPATIENT
Start: 2021-05-06 | End: 2021-05-09

## 2021-05-06 RX ORDER — HYDROMORPHONE HYDROCHLORIDE 2 MG/ML
0.5 INJECTION INTRAMUSCULAR; INTRAVENOUS; SUBCUTANEOUS EVERY 4 HOURS
Refills: 0 | Status: DISCONTINUED | OUTPATIENT
Start: 2021-05-06 | End: 2021-05-08

## 2021-05-06 RX ORDER — CHLORHEXIDINE GLUCONATE 213 G/1000ML
15 SOLUTION TOPICAL EVERY 12 HOURS
Refills: 0 | Status: DISCONTINUED | OUTPATIENT
Start: 2021-05-06 | End: 2021-05-06

## 2021-05-06 RX ORDER — CEFAZOLIN SODIUM 1 G
2000 VIAL (EA) INJECTION EVERY 8 HOURS
Refills: 0 | Status: DISCONTINUED | OUTPATIENT
Start: 2021-05-06 | End: 2021-05-06

## 2021-05-06 RX ORDER — FENTANYL CITRATE 50 UG/ML
0.5 INJECTION INTRAVENOUS
Qty: 2500 | Refills: 0 | Status: DISCONTINUED | OUTPATIENT
Start: 2021-05-06 | End: 2021-05-06

## 2021-05-06 RX ORDER — DEXTROSE 50 % IN WATER 50 %
12.5 SYRINGE (ML) INTRAVENOUS ONCE
Refills: 0 | Status: DISCONTINUED | OUTPATIENT
Start: 2021-05-06 | End: 2021-05-09

## 2021-05-06 RX ORDER — DEXTROSE 50 % IN WATER 50 %
25 SYRINGE (ML) INTRAVENOUS ONCE
Refills: 0 | Status: DISCONTINUED | OUTPATIENT
Start: 2021-05-06 | End: 2021-05-09

## 2021-05-06 RX ORDER — GABAPENTIN 400 MG/1
300 CAPSULE ORAL ONCE
Refills: 0 | Status: COMPLETED | OUTPATIENT
Start: 2021-05-06 | End: 2021-05-06

## 2021-05-06 RX ORDER — PANTOPRAZOLE SODIUM 20 MG/1
40 TABLET, DELAYED RELEASE ORAL DAILY
Refills: 0 | Status: DISCONTINUED | OUTPATIENT
Start: 2021-05-06 | End: 2021-05-09

## 2021-05-06 RX ORDER — HYDROMORPHONE HYDROCHLORIDE 2 MG/ML
1 INJECTION INTRAMUSCULAR; INTRAVENOUS; SUBCUTANEOUS EVERY 4 HOURS
Refills: 0 | Status: DISCONTINUED | OUTPATIENT
Start: 2021-05-06 | End: 2021-05-08

## 2021-05-06 RX ORDER — INSULIN LISPRO 100/ML
VIAL (ML) SUBCUTANEOUS EVERY 6 HOURS
Refills: 0 | Status: DISCONTINUED | OUTPATIENT
Start: 2021-05-06 | End: 2021-05-09

## 2021-05-06 RX ORDER — ONDANSETRON 8 MG/1
4 TABLET, FILM COATED ORAL EVERY 4 HOURS
Refills: 0 | Status: DISCONTINUED | OUTPATIENT
Start: 2021-05-06 | End: 2021-05-10

## 2021-05-06 RX ORDER — ACETAMINOPHEN 500 MG
1000 TABLET ORAL ONCE
Refills: 0 | Status: COMPLETED | OUTPATIENT
Start: 2021-05-07 | End: 2021-05-07

## 2021-05-06 RX ORDER — MAGNESIUM SULFATE 500 MG/ML
2 VIAL (ML) INJECTION ONCE
Refills: 0 | Status: COMPLETED | OUTPATIENT
Start: 2021-05-06 | End: 2021-05-06

## 2021-05-06 RX ORDER — POTASSIUM PHOSPHATE, MONOBASIC POTASSIUM PHOSPHATE, DIBASIC 236; 224 MG/ML; MG/ML
30 INJECTION, SOLUTION INTRAVENOUS ONCE
Refills: 0 | Status: COMPLETED | OUTPATIENT
Start: 2021-05-06 | End: 2021-05-06

## 2021-05-06 RX ORDER — CEFAZOLIN SODIUM 1 G
2000 VIAL (EA) INJECTION EVERY 8 HOURS
Refills: 0 | Status: COMPLETED | OUTPATIENT
Start: 2021-05-06 | End: 2021-05-07

## 2021-05-06 RX ORDER — GLUCAGON INJECTION, SOLUTION 0.5 MG/.1ML
1 INJECTION, SOLUTION SUBCUTANEOUS ONCE
Refills: 0 | Status: DISCONTINUED | OUTPATIENT
Start: 2021-05-06 | End: 2021-05-18

## 2021-05-06 RX ORDER — CHLORHEXIDINE GLUCONATE 213 G/1000ML
1 SOLUTION TOPICAL
Refills: 0 | Status: DISCONTINUED | OUTPATIENT
Start: 2021-05-06 | End: 2021-05-08

## 2021-05-06 RX ADMIN — Medication 400 MILLIGRAM(S): at 18:19

## 2021-05-06 RX ADMIN — Medication 2000 MILLIGRAM(S): at 21:10

## 2021-05-06 RX ADMIN — APREPITANT 40 MILLIGRAM(S): 80 CAPSULE ORAL at 07:12

## 2021-05-06 RX ADMIN — Medication 50 GRAM(S): at 13:31

## 2021-05-06 RX ADMIN — FENTANYL CITRATE 5.48 MICROGRAM(S)/KG/HR: 50 INJECTION INTRAVENOUS at 13:13

## 2021-05-06 RX ADMIN — PROPOFOL 3.29 MICROGRAM(S)/KG/MIN: 10 INJECTION, EMULSION INTRAVENOUS at 12:20

## 2021-05-06 RX ADMIN — Medication 0: at 21:09

## 2021-05-06 RX ADMIN — Medication 1000 MILLIGRAM(S): at 18:49

## 2021-05-06 RX ADMIN — PANTOPRAZOLE SODIUM 40 MILLIGRAM(S): 20 TABLET, DELAYED RELEASE ORAL at 13:13

## 2021-05-06 RX ADMIN — Medication 2000 MILLIGRAM(S): at 14:48

## 2021-05-06 RX ADMIN — POTASSIUM PHOSPHATE, MONOBASIC POTASSIUM PHOSPHATE, DIBASIC 83.33 MILLIMOLE(S): 236; 224 INJECTION, SOLUTION INTRAVENOUS at 14:49

## 2021-05-06 RX ADMIN — Medication 1000 MILLIGRAM(S): at 07:12

## 2021-05-06 RX ADMIN — SODIUM CHLORIDE 120 MILLILITER(S): 9 INJECTION, SOLUTION INTRAVENOUS at 19:59

## 2021-05-06 RX ADMIN — GABAPENTIN 300 MILLIGRAM(S): 400 CAPSULE ORAL at 07:12

## 2021-05-06 NOTE — CONSULT NOTE ADULT - SUBJECTIVE AND OBJECTIVE BOX
HPI: Ally Lai is a 66y/o F with past medical history significant for bilateral hip osteoarthritis s/p bilateral hip replacements (L 2019, R 2016), low back who presents on 5/6/21 for elective Stage 1 ALIF L4-L5 and Stage 2 Laminectomy/PSF L3-L5. Intra-operatively the patient was undergoing the anterior spinal approach, however she had a sudden decline in her vital signs, with a decrease in blood pressure to 80/40, with oxygen desaturation to 91%, CO2 20. Her vital signs recovered, however the posterior portion of the surgery was aborted. SICU was consulted as the patient was transferred to SICU intubated, with plan for CTA PE protocol prior to rule out pulmonary embolism.     Intra-op Course: ALIF L4-5, aborted due to unstable vital signs; ***    HEALTH ISSUES - PROBLEM Dx:  Spondylolisthesis  Asthma  HLD (hyperlipidemia)  Obesity  Hypertension      PAST MEDICAL & SURGICAL HISTORY:  Asthma-childhood  Hypertension  Obesity  Osteoarthritis of hips, bilateral  HLD (hyperlipidemia)  H/O breast surgery-bilateral lift  History of hip replacement, total, right-2016 right , left 2018      Social History:  Denies alcohol use  Denies tobacco use (05 May 2021 09:54)    Allergies  latex (Rash)  penicillin (Rash)    Home Medications:  gabapentin 100 mg oral tablet: 100 milligram(s) orally (06 May 2021 07:08)  triamterene-hydrochlorothiazide 37.5 mg- 25 mg oral capsule: 1 cap(s) orally once a day (06 May 2021 07:08)  Tylenol 8 HR Arthritis Pain 650 mg oral tablet, extended release: 2 tab(s) orally every 8 hours (06 May 2021 07:08)  vitamin c: 500 milligram(s) orally once a day (06 May 2021 07:08)    MEDICATIONS  (STANDING):    MEDICATIONS  (PRN):      Physical Exam: ***  General: Well appearing, resting comfortably in bed in no acute distress  Neuro: Grossly intact bilaterally   HEENT: Normocephalic, atraumatic, no JVD, trachea midline   Chest:   Heart: Regular S1/S2, no murmus rubs or gallops    Lungs: Unlabored breathing on ***; Clear to auscultation bilaterally, no adventitious sounds   Abdomen: Soft, non-tender, normoactive bowel sounds, no tenderness to palpation in all 4 quadrants   Upper Extremities: No edema, freely mobile bilaterally   Lower Extremities: No edema, SCDs in place, 2+ DP pulse bilaterally   Skin: Warm, non-diaphoretic throughout       Labs:         CAPILLARY BLOOD GLUCOSE  POCT Blood Glucose.: 115 mg/dL (06 May 2021 10:24)  POCT Blood Glucose.: 113 mg/dL (06 May 2021 09:52)        Culture - Urine (collected 03 May 2021 12:48)  Source: .Urine Clean Catch (Midstream)  Final Report (04 May 2021 09:34):    No growth        Vital Signs:   Vital Signs Last 24 Hrs  T(C): --  T(F): --  HR: --  BP: --  BP(mean): --  RR: --  SpO2: --      Input/Output:   I&O's Detail    Daily     Daily     Height (cm): 165.1 (05-06-21 @ 06:48)  Weight (kg): 109.6 (05-06-21 @ 06:48)  BMI (kg/m2): 40.2 (05-06-21 @ 06:48)  BSA (m2): 2.14 (05-06-21 @ 06:48) HPI: Ally Lai is a 64y/o F with past medical history significant for bilateral hip osteoarthritis s/p bilateral hip replacements (L 2019, R 2016), low back who presents on 5/6/21 for elective Stage 1 ALIF L4-L5 and Stage 2 Laminectomy/PSF L3-L5. Intra-operatively the patient was undergoing the anterior spinal approach, however she had a sudden decline in her vital signs, with a decrease in blood pressure to 50s systolic, with oxygen desaturation to 91%, ETCO2 30 to 20. Her vital signs recovered, however the posterior portion of the surgery was aborted. SICU was consulted as the patient was transferred to SICU intubated, with plan for CTA PE protocol prior to rule out pulmonary embolism.     Intra-op Course: ALIF L4-5, aborted due to unstable vital signs; 3L LR, EBL 100mL, UOP 100mL     HEALTH ISSUES - PROBLEM Dx:  Spondylolisthesis  Asthma  HLD (hyperlipidemia)  Obesity  Hypertension      PAST MEDICAL & SURGICAL HISTORY:  Asthma-childhood  Hypertension  Obesity  Osteoarthritis of hips, bilateral  HLD (hyperlipidemia)  H/O breast surgery-bilateral lift  History of hip replacement, total, right-2016 right , left 2018      Social History:  Denies alcohol use  Denies tobacco use (05 May 2021 09:54)    Allergies  latex (Rash)  penicillin (Rash)    Home Medications:  gabapentin 100 mg oral tablet: 100 milligram(s) orally (06 May 2021 07:08)  triamterene-hydrochlorothiazide 37.5 mg- 25 mg oral capsule: 1 cap(s) orally once a day (06 May 2021 07:08)  Tylenol 8 HR Arthritis Pain 650 mg oral tablet, extended release: 2 tab(s) orally every 8 hours (06 May 2021 07:08)  vitamin c: 500 milligram(s) orally once a day (06 May 2021 07:08)    MEDICATIONS  (STANDING):  chlorhexidine 4% Liquid 1 Application(s) Topical <User Schedule>  dextrose 40% Gel 15 Gram(s) Oral once  dextrose 5%. 1000 milliLiter(s) (50 mL/Hr) IV Continuous <Continuous>  dextrose 5%. 1000 milliLiter(s) (100 mL/Hr) IV Continuous <Continuous>  dextrose 50% Injectable 25 Gram(s) IV Push once  dextrose 50% Injectable 12.5 Gram(s) IV Push once  dextrose 50% Injectable 25 Gram(s) IV Push once  fentaNYL   Infusion. 0.5 MICROgram(s)/kG/Hr (5.48 mL/Hr) IV Continuous <Continuous>  glucagon  Injectable 1 milliGRAM(s) IntraMuscular once  insulin lispro (ADMELOG) corrective regimen sliding scale   SubCutaneous every 6 hours  pantoprazole  Injectable 40 milliGRAM(s) IV Push daily  propofol Infusion 5 MICROgram(s)/kG/Min (3.29 mL/Hr) IV Continuous <Continuous>      MEDICATIONS  (PRN):      Physical Exam:  General: Overweight female, intubated   Neuro: Grossly intact bilaterally   HEENT: Normocephalic, atraumatic, no JVD, trachea midline   Chest: No subcutaneous emphysema   Heart: Regular S1/S2, no murmurs rubs or gallops    Lungs: Unlabored breathing on ventilator; Clear to auscultation bilaterally, no adventitious sounds, no stridor  Abdomen: Soft, non-tender, normoactive bowel sounds, no tenderness to palpation in all 4 quadrants   Back: NIC with serosang output  Upper Extremities: No edema, freely mobile bilaterally   Lower Extremities: No edema, SCDs in place, 2+ DP pulse bilaterally   Skin: Warm, non-diaphoretic throughout       Labs:         CAPILLARY BLOOD GLUCOSE  POCT Blood Glucose.: 115 mg/dL (06 May 2021 10:24)  POCT Blood Glucose.: 113 mg/dL (06 May 2021 09:52)        Culture - Urine (collected 03 May 2021 12:48)  Source: .Urine Clean Catch (Midstream)  Final Report (04 May 2021 09:34):    No growth        Vital Signs:   Vital Signs Last 24 Hrs  T(C): --  T(F): --  HR: --  BP: --  BP(mean): --  RR: --  SpO2: --      Input/Output:   I&O's Detail    Daily     Daily     Height (cm): 165.1 (05-06-21 @ 06:48)  Weight (kg): 109.6 (05-06-21 @ 06:48)  BMI (kg/m2): 40.2 (05-06-21 @ 06:48)  BSA (m2): 2.14 (05-06-21 @ 06:48)

## 2021-05-06 NOTE — CONSULT NOTE ADULT - ASSESSMENT
Assessment: Ally Lai is a 66y/o F with past medical history significant for bilateral hip osteoarthritis s/p bilateral hip replacements (L 2019, R 2016), low back who presents on 5/6/21 for elective Stage 1 ALIF L4-L5 and Stage 2 Laminectomy/PSF L3-L5. Intra-operatively the patient was undergoing the anterior spinal approach, however she had a sudden decline in her vital signs, with a decrease in blood pressure to 80/40, with oxygen desaturation to 91%, CO2 20. Her vital signs recovered, however the posterior portion of the surgery was aborted. SICU was consulted as the patient was transferred to SICU intubated, with plan for CTA PE protocol prior to rule out PE.     Plan:   Neuro: Fentanyl/Propofol gtt   CV: Maintain MAP >65  Pulm: Intubated   GI/FEN: NPO, PPI  : Frank   Endo: ISS  ID:   Heme: Maintain hgb >7  PPx: SCDs  L/D/T: jonny Gonzáles (5/6--)   PT/OT: Not ordered  Dispo: SICU  Assessment: Ally Lai is a 64y/o F with past medical history significant for bilateral hip osteoarthritis s/p bilateral hip replacements (L 2019, R 2016), low back who presents on 5/6/21 for elective Stage 1 ALIF L4-L5 and Stage 2 Laminectomy/PSF L3-L5. Intra-operatively the patient was undergoing the anterior spinal approach, however she had a sudden decline in her vital signs, with a decrease in blood pressure to 50s systolic, with oxygen desaturation to 91%, ETCO2 30 to 20. Her vital signs recovered, however the posterior portion of the surgery was aborted. SICU was consulted as the patient was transferred to SICU intubated, with plan for CTA PE protocol prior to rule out pulmonary embolism.     Plan:   Neuro: Fentanyl/Propofol gtt   CV: Maintain MAP >65  Pulm: Intubated AC/100/450/12/5  GI/FEN: NPO, PPI  : Monika   Endo: [facility]  ID: Rosanne-op Ancef x24 hrs  Heme: Maintain hgb >7  PPx: SCDs, holding SQH  L/D/T: jonny Gonzáles (5/6--)   PT/OT: Not ordered  Dispo: SICU  Assessment: 64y/o F with past medical history significant for HTN, HLD, childhood asthma, bilateral hip osteoarthritis s/p bilateral hip replacements (L 2019, R 2016), low back who presents on 5/6/21 for elective Stage 1 ALIF L4-L5 and Stage 2 Laminectomy/PSF L3-L5. Intra-operatively the patient was undergoing the anterior spinal approach, however she had a sudden decline in her vital signs, with a decrease in blood pressure to 50s systolic, with oxygen desaturation to 91%, ETCO2 30 to 20. Her vital signs recovered, however the posterior portion of the surgery was aborted. SICU was consulted as the patient was transferred to SICU intubated, with plan for CTA PE protocol prior to rule out pulmonary embolism.     Plan:   Neuro: Fentanyl/Propofol gtt   CV: Maintain MAP >65; HD stable, EKG wnl, TTE pending   Pulm: Intubated AC/100/450/12/5; Concern for PE, CTA negative for saddle PE with enlarged pulmonary artery noted  GI/FEN: NPO, PPI, LR@120/hr  : Monika   Endo: ISS  ID: Rosanne-op Ancef x24 hrs  Heme: Maintain hgb >7  PPx: SCDs, holding SQH  L/D/T: jonny Gonzáles (5/6--)   PT/OT: Not ordered  Dispo: SICU

## 2021-05-06 NOTE — PROGRESS NOTE ADULT - SUBJECTIVE AND OBJECTIVE BOX
Ortho Post Op Check    Procedure: ALIF L4-5, intraop complication hypotension requiring pressors, posterior procedure aborted and patient transferred to SICU intubated     Surgeon: Dr. Velazco     Patient seen and examined at bedside in the SICU   Patient intubated and sedated     Vital Signs Last 24 Hrs  T(C): 34.7 (05-06-21 @ 14:00), Max: 34.7 (05-06-21 @ 13:00)  T(F): 94.5 (05-06-21 @ 14:00), Max: 94.5 (05-06-21 @ 14:00)  HR: 72 (05-06-21 @ 15:00) (68 - 77)  BP: 113/68 (05-06-21 @ 14:00) (113/68 - 149/94)  BP(mean): 85 (05-06-21 @ 14:00) (85 - 116)  RR: 12 (05-06-21 @ 15:00) (12 - 27)  SpO2: 98% (05-06-21 @ 15:00) (96% - 100%)  AVSS    General: Pt intubated, sedated  Dressing C/D/I, ALIF incision   Pulses: 2+ DP pulses bilateral           A/P: 65yFemale POD#0 s/p ALIF L4-5 with intraop complication of hypotension/hypoxia, planned 2nd stage procedure aborted    - Patient transferred to SICU post op  - still intubated/sedated   - management per SICU  - CT negative for PE  - Ortho will follow     Ortho Pager [phone]

## 2021-05-06 NOTE — CHART NOTE - NSCHARTNOTEFT_GEN_A_CORE
Vascular Surgery Post-Op Note, PCN:     Pre-Op Dx: M43.16    Lumbar disc herniation    Spondylolisthesis at L4-L5 level    Lumbar stenosis      Procedure: ALIF (anterior lumbar interbody fusion)    Open ALIF      Surgeon:Dr. Zepeda    Subjective: Patient intubated and sedated.    Vital Signs Last 24 Hrs  T(C): 34.7 (06 May 2021 13:00), Max: 34.7 (06 May 2021 13:00)  T(F): 94.4 (06 May 2021 13:00), Max: 94.4 (06 May 2021 13:00)  HR: 68 (06 May 2021 14:40) (68 - 77)  BP: 113/68 (06 May 2021 14:00) (113/68 - 149/94)  BP(mean): 85 (06 May 2021 14:00) (85 - 116)  RR: 12 (06 May 2021 14:40) (12 - 27)  SpO2: 100% (06 May 2021 14:40) (96% - 100%)    Physical Exam:  General: NAD, resting comfortably in bed  Pulmonary: no respiratory distress ventilator  Cardiovascular: NSR  Abdominal: soft, NT/ND, incision c/d/i  Extremities: WWP  Pulses: Right palpable dp/pt. Left: palpable dp/pt      LABS:                        10.1   11.84 )-----------( 171      ( 06 May 2021 12:37 )             31.0     05-06    142  |  108  |  17  ----------------------------<  111<H>  3.7   |  26  |  0.70    Ca    8.4      06 May 2021 12:37  Phos  2.8     05-06  Mg     1.7     05-06    TPro  5.9<L>  /  Alb  3.3  /  TBili  0.8  /  DBili  x   /  AST  25  /  ALT  23  /  AlkPhos  117  05-06    PT/INR - ( 06 May 2021 12:37 )   PT: 14.1 sec;   INR: 1.18            CAPILLARY BLOOD GLUCOSE      POCT Blood Glucose.: 94 mg/dL (06 May 2021 13:04)  POCT Blood Glucose.: 115 mg/dL (06 May 2021 10:24)  POCT Blood Glucose.: 113 mg/dL (06 May 2021 09:52)    LIVER FUNCTIONS - ( 06 May 2021 12:37 )  Alb: 3.3 g/dL / Pro: 5.9 g/dL / ALK PHOS: 117 U/L / ALT: 23 U/L / AST: 25 U/L / GGT: x               Radiology and Additional Studies:    Assessment:65y Female s/p above procedure    Plan:  Pain/nausea control PRN  Home meds  Incentive spirometer/OOB/Ambulate  Vitals per protocol  AM labs  wean to extubate  care per ICU

## 2021-05-07 LAB
A1C WITH ESTIMATED AVERAGE GLUCOSE RESULT: 4.9 % — SIGNIFICANT CHANGE UP (ref 4–5.6)
ANION GAP SERPL CALC-SCNC: 8 MMOL/L — SIGNIFICANT CHANGE UP (ref 5–17)
APPEARANCE UR: CLEAR — SIGNIFICANT CHANGE UP
BACTERIA # UR AUTO: PRESENT /HPF
BILIRUB UR-MCNC: NEGATIVE — SIGNIFICANT CHANGE UP
BUN SERPL-MCNC: 13 MG/DL — SIGNIFICANT CHANGE UP (ref 7–23)
CALCIUM SERPL-MCNC: 8.4 MG/DL — SIGNIFICANT CHANGE UP (ref 8.4–10.5)
CHLORIDE SERPL-SCNC: 108 MMOL/L — SIGNIFICANT CHANGE UP (ref 96–108)
CO2 SERPL-SCNC: 24 MMOL/L — SIGNIFICANT CHANGE UP (ref 22–31)
COLOR SPEC: YELLOW — SIGNIFICANT CHANGE UP
COMMENT - URINE: SIGNIFICANT CHANGE UP
COVID-19 SPIKE DOMAIN AB INTERP: POSITIVE
COVID-19 SPIKE DOMAIN ANTIBODY RESULT: >250 U/ML — HIGH
CREAT SERPL-MCNC: 0.69 MG/DL — SIGNIFICANT CHANGE UP (ref 0.5–1.3)
DIFF PNL FLD: ABNORMAL
EPI CELLS # UR: SIGNIFICANT CHANGE UP /HPF (ref 0–5)
ESTIMATED AVERAGE GLUCOSE: 94 MG/DL — SIGNIFICANT CHANGE UP (ref 68–114)
GLUCOSE SERPL-MCNC: 119 MG/DL — HIGH (ref 70–99)
GLUCOSE UR QL: NEGATIVE — SIGNIFICANT CHANGE UP
HCT VFR BLD CALC: 31.3 % — LOW (ref 34.5–45)
HGB BLD-MCNC: 10.2 G/DL — LOW (ref 11.5–15.5)
KETONES UR-MCNC: NEGATIVE — SIGNIFICANT CHANGE UP
LEUKOCYTE ESTERASE UR-ACNC: NEGATIVE — SIGNIFICANT CHANGE UP
MAGNESIUM SERPL-MCNC: 2.2 MG/DL — SIGNIFICANT CHANGE UP (ref 1.6–2.6)
MCHC RBC-ENTMCNC: 31.1 PG — SIGNIFICANT CHANGE UP (ref 27–34)
MCHC RBC-ENTMCNC: 32.6 GM/DL — SIGNIFICANT CHANGE UP (ref 32–36)
MCV RBC AUTO: 95.4 FL — SIGNIFICANT CHANGE UP (ref 80–100)
NITRITE UR-MCNC: NEGATIVE — SIGNIFICANT CHANGE UP
NRBC # BLD: 0 /100 WBCS — SIGNIFICANT CHANGE UP (ref 0–0)
PH UR: 5 — SIGNIFICANT CHANGE UP (ref 5–8)
PHOSPHATE SERPL-MCNC: 4.1 MG/DL — SIGNIFICANT CHANGE UP (ref 2.5–4.5)
PLATELET # BLD AUTO: 191 K/UL — SIGNIFICANT CHANGE UP (ref 150–400)
POTASSIUM SERPL-MCNC: 4.3 MMOL/L — SIGNIFICANT CHANGE UP (ref 3.5–5.3)
POTASSIUM SERPL-SCNC: 4.3 MMOL/L — SIGNIFICANT CHANGE UP (ref 3.5–5.3)
PROT UR-MCNC: ABNORMAL MG/DL
RBC # BLD: 3.28 M/UL — LOW (ref 3.8–5.2)
RBC # FLD: 14.2 % — SIGNIFICANT CHANGE UP (ref 10.3–14.5)
RBC CASTS # UR COMP ASSIST: > 10 /HPF
SARS-COV-2 IGG+IGM SERPL QL IA: >250 U/ML — HIGH
SARS-COV-2 IGG+IGM SERPL QL IA: POSITIVE
SODIUM SERPL-SCNC: 140 MMOL/L — SIGNIFICANT CHANGE UP (ref 135–145)
SP GR SPEC: >=1.03 — SIGNIFICANT CHANGE UP (ref 1–1.03)
UROBILINOGEN FLD QL: 0.2 E.U./DL — SIGNIFICANT CHANGE UP
WBC # BLD: 13.04 K/UL — HIGH (ref 3.8–10.5)
WBC # FLD AUTO: 13.04 K/UL — HIGH (ref 3.8–10.5)
WBC UR QL: ABNORMAL /HPF

## 2021-05-07 PROCEDURE — 99233 SBSQ HOSP IP/OBS HIGH 50: CPT | Mod: GC

## 2021-05-07 RX ORDER — GABAPENTIN 400 MG/1
100 CAPSULE ORAL DAILY
Refills: 0 | Status: DISCONTINUED | OUTPATIENT
Start: 2021-05-07 | End: 2021-05-10

## 2021-05-07 RX ORDER — SODIUM CHLORIDE 9 MG/ML
1000 INJECTION, SOLUTION INTRAVENOUS
Refills: 0 | Status: DISCONTINUED | OUTPATIENT
Start: 2021-05-07 | End: 2021-05-10

## 2021-05-07 RX ADMIN — HYDROMORPHONE HYDROCHLORIDE 0.5 MILLIGRAM(S): 2 INJECTION INTRAMUSCULAR; INTRAVENOUS; SUBCUTANEOUS at 07:30

## 2021-05-07 RX ADMIN — HYDROMORPHONE HYDROCHLORIDE 1 MILLIGRAM(S): 2 INJECTION INTRAMUSCULAR; INTRAVENOUS; SUBCUTANEOUS at 23:42

## 2021-05-07 RX ADMIN — HYDROMORPHONE HYDROCHLORIDE 0.5 MILLIGRAM(S): 2 INJECTION INTRAMUSCULAR; INTRAVENOUS; SUBCUTANEOUS at 07:19

## 2021-05-07 RX ADMIN — ONDANSETRON 4 MILLIGRAM(S): 8 TABLET, FILM COATED ORAL at 10:43

## 2021-05-07 RX ADMIN — GABAPENTIN 100 MILLIGRAM(S): 400 CAPSULE ORAL at 11:11

## 2021-05-07 RX ADMIN — HYDROMORPHONE HYDROCHLORIDE 1 MILLIGRAM(S): 2 INJECTION INTRAMUSCULAR; INTRAVENOUS; SUBCUTANEOUS at 23:22

## 2021-05-07 RX ADMIN — Medication 400 MILLIGRAM(S): at 06:13

## 2021-05-07 RX ADMIN — HYDROMORPHONE HYDROCHLORIDE 0.5 MILLIGRAM(S): 2 INJECTION INTRAMUSCULAR; INTRAVENOUS; SUBCUTANEOUS at 00:50

## 2021-05-07 RX ADMIN — Medication 400 MILLIGRAM(S): at 00:16

## 2021-05-07 RX ADMIN — Medication 2000 MILLIGRAM(S): at 05:10

## 2021-05-07 RX ADMIN — Medication 1000 MILLIGRAM(S): at 00:42

## 2021-05-07 RX ADMIN — SODIUM CHLORIDE 75 MILLILITER(S): 9 INJECTION, SOLUTION INTRAVENOUS at 16:53

## 2021-05-07 RX ADMIN — SODIUM CHLORIDE 120 MILLILITER(S): 9 INJECTION, SOLUTION INTRAVENOUS at 14:12

## 2021-05-07 RX ADMIN — CHLORHEXIDINE GLUCONATE 1 APPLICATION(S): 213 SOLUTION TOPICAL at 05:11

## 2021-05-07 RX ADMIN — Medication 1000 MILLIGRAM(S): at 06:21

## 2021-05-07 RX ADMIN — HYDROMORPHONE HYDROCHLORIDE 0.5 MILLIGRAM(S): 2 INJECTION INTRAMUSCULAR; INTRAVENOUS; SUBCUTANEOUS at 01:00

## 2021-05-07 RX ADMIN — SODIUM CHLORIDE 120 MILLILITER(S): 9 INJECTION, SOLUTION INTRAVENOUS at 03:48

## 2021-05-07 RX ADMIN — PANTOPRAZOLE SODIUM 40 MILLIGRAM(S): 20 TABLET, DELAYED RELEASE ORAL at 11:11

## 2021-05-07 NOTE — PROGRESS NOTE ADULT - ASSESSMENT
Assessment: 64y/o F with past medical history significant for HTN, HLD, childhood asthma, bilateral hip osteoarthritis s/p bilateral hip replacements (L 2019, R 2016), low back who presents on 5/6/21 for elective Stage 1 ALIF L4-L5 and Stage 2 Laminectomy/PSF L3-L5. Intra-operatively the patient was undergoing the anterior spinal approach, however she had a sudden decline in her vital signs, with a decrease in blood pressure to 50s systolic, with oxygen desaturation to 91%, ETCO2 30 to 20. Her vital signs recovered, however the posterior portion of the surgery was aborted. SICU was consulted as the patient was transferred to SICU intubated, with plan for CTA PE protocol prior to rule out pulmonary embolism. CT PE negative; TTE wnl. Now extubated and HDS.    Plan:   Neuro: Pain PRN; extubated 5/6  CV: Maintain MAP >65; HD stable, EKG wnl, TTE wnl; SBPs 110s-160s  Pulm: Extubated 5/6;  CTA negative for saddle PE with enlarged pulmonary artery noted; b/l atelectasis - encourage IS  GI/FEN: CLD, PPI, LR@120/hr; ADAT with return of bowel function  : Monika   Endo: ISS  ID: Rosanne-op Ancef x24 hrs  Heme: Maintain hgb >7  PPx: SCDs, holding SQH  L/D/T: jonny Gonzáles (5/6--)   PT/OT: Not ordered; OOB to chair  Dispo: Step down today

## 2021-05-07 NOTE — PROGRESS NOTE ADULT - ASSESSMENT
65yFemale s/p ALIF L4-5 on 5/6 with intraop complication of hypotension/hypoxia, doing well    - palpable distal pulses  - incision c/d/i  - diet per ortho  - continue workup of intraop hypotension  - appreciate ICU care

## 2021-05-07 NOTE — DIETITIAN INITIAL EVALUATION ADULT. - PROBLEM SELECTOR PLAN 1
Admit to Orthopaedic Service.  Presents today for elective ALIF L4-L5, PSF L3-L5  Pt medically stable and cleared for procedure today by Dr. Vásquez and Dr. Hutson

## 2021-05-07 NOTE — PROGRESS NOTE ADULT - SUBJECTIVE AND OBJECTIVE BOX
Ortho Progress Note    Patient seen and examined at bedside in the SICU. Extubated, stable overnight without pressors.     Vital Signs Last 24 Hrs  T(C): 37 (06 May 2021 22:10), Max: 37 (06 May 2021 22:10)  T(F): 98.6 (06 May 2021 22:10), Max: 98.6 (06 May 2021 22:10)  HR: 85 (07 May 2021 06:00) (68 - 95)  BP: 116/65 (07 May 2021 06:00) (113/68 - 163/95)  BP(mean): 82 (07 May 2021 06:00) (80 - 123)  RR: 11 (07 May 2021 06:00) (9 - 27)  SpO2: 99% (07 May 2021 06:00) (96% - 100%)    AVSS    General: Pt intubated, sedated  Dressing C/D/I, ALIF incision   Pulses: 2+ DP pulses bilateral           A/P: 65yFemale s/p ALIF L4-5 on 5/6 with intraop complication of hypotension/hypoxia, doing well overnight    - Patient transferred to SICU post op  - extubated and stable off pressors   - likely step down today pending confirmation with haylie   - PE work up negative   - Ortho will follow     Ortho Pager [phone]

## 2021-05-07 NOTE — DIETITIAN INITIAL EVALUATION ADULT. - OTHER CALCULATIONS
IBW used to calculate energy needs due to pt's current body weight exceeding 120% of IBW, adjusted for age BMI post op

## 2021-05-07 NOTE — PROGRESS NOTE ADULT - SUBJECTIVE AND OBJECTIVE BOX
INCOMPLETE NOTE  24HR EVENTS: Pt underwent elective L4-5 ALIF yesterday with Dr. Velazco/Duane. Intraop course c/b episode of hypotension that lasted approximately 2 min. Stage 2 posterior fusion was aborted and pt was taken to CTA PE for high suspicion for saddle PE, transferred to SICU for monitoring. CT showing mild atelectasis bilaterally without evidence of PE. Pt extubated yesterday and tolerated well. No acute events overnight. Passed dysphagia screen.    SUBJECTIVE: Pt seen and examined on morning rounds. Pt without complaints. Happy to be extubated. Denies numbness/tingling in lower extremities. -BM/-F overnight. Denies SOB/Chest pain, weakness/dizziness, N/V. Pain well controlled.      Vital Signs Last 24 Hrs  T(C): 37 (06 May 2021 22:10), Max: 37 (06 May 2021 22:10)  T(F): 98.6 (06 May 2021 22:10), Max: 98.6 (06 May 2021 22:10)  HR: 95 (07 May 2021 07:00) (68 - 95)  BP: 112/67 (07 May 2021 07:00) (112/67 - 163/95)  BP(mean): 82 (07 May 2021 07:00) (80 - 123)  RR: 29 (07 May 2021 07:00) (9 - 29)  SpO2: 96% (07 May 2021 07:00) (96% - 100%)    Physical Exam:  General: NAD, resting comfortably in bed  Pulmonary: Nonlabored breathing, no respiratory distress  Cardiovascular: S1 S2 RRR no MRG  Abdominal: Soft, L paramedian incision with mild tenderness, nondistended; dressing c/d/i  Extremities: L2-S1 Motor 5/5; Sensation 5/5 L2-S1  Vascular: 2+ pulses DP/PT bilaterally    chlorhexidine 4% Liquid 1 Application(s) Topical <User Schedule>  dextrose 40% Gel 15 Gram(s) Oral once  dextrose 5%. 1000 milliLiter(s) IV Continuous <Continuous>  dextrose 5%. 1000 milliLiter(s) IV Continuous <Continuous>  dextrose 50% Injectable 25 Gram(s) IV Push once  dextrose 50% Injectable 12.5 Gram(s) IV Push once  dextrose 50% Injectable 25 Gram(s) IV Push once  glucagon  Injectable 1 milliGRAM(s) IntraMuscular once  HYDROmorphone  Injectable 0.5 milliGRAM(s) IV Push every 4 hours PRN  HYDROmorphone  Injectable 1 milliGRAM(s) IV Push every 4 hours PRN  insulin lispro (ADMELOG) corrective regimen sliding scale   SubCutaneous every 6 hours  lactated ringers. 1000 milliLiter(s) IV Continuous <Continuous>  ondansetron Injectable 4 milliGRAM(s) IV Push every 4 hours PRN  pantoprazole  Injectable 40 milliGRAM(s) IV Push daily    Assessment: 64y/o F with past medical history significant for HTN, HLD, childhood asthma, bilateral hip osteoarthritis s/p bilateral hip replacements (L 2019, R 2016), low back who presents on 5/6/21 for elective Stage 1 ALIF L4-L5 and Stage 2 Laminectomy/PSF L3-L5. Intra-operatively the patient was undergoing the anterior spinal approach, however she had a sudden decline in her vital signs, with a decrease in blood pressure to 50s systolic, with oxygen desaturation to 91%, ETCO2 30 to 20. Her vital signs recovered, however the posterior portion of the surgery was aborted. SICU was consulted as the patient was transferred to SICU intubated, with plan for CTA PE protocol prior to rule out pulmonary embolism. CT PE negative; TTE wnl.    Plan:   Neuro: Pain PRN; extubated 5/6  CV: Maintain MAP >65; HD stable, EKG wnl, TTE wnl  Pulm: Extubated 5/6;  CTA negative for saddle PE with enlarged pulmonary artery noted  GI/FEN: CLD, PPI, LR@120/hr; ADAT with return of bowel function  : Frank   Endo: ISS  ID: Rosanne-op Ancef x24 hrs  Heme: Maintain hgb >7  PPx: SCDs, holding SQH  L/D/T: PIVs, jonny (5/6--)   PT/OT: Not ordered; OOB to chair;  Dispo: Step down today         24HR EVENTS: Pt underwent elective L4-5 ALIF yesterday with Dr. Velazco/Duane. Intraop course c/b episode of hypotension that lasted approximately 2 min. Stage 2 posterior fusion was aborted and pt was taken to CTA PE for high suspicion for saddle PE, transferred to SICU for monitoring. CT showing mild atelectasis bilaterally without evidence of PE. Pt extubated yesterday and tolerated well. No acute events overnight. Passed dysphagia screen.    SUBJECTIVE: Pt seen and examined on morning rounds. Pt without complaints. Happy to be extubated. Denies numbness/tingling in lower extremities. -BM/-F overnight. Denies SOB/Chest pain, weakness/dizziness, N/V. Pain well controlled.      Vital Signs Last 24 Hrs  T(C): 37 (06 May 2021 22:10), Max: 37 (06 May 2021 22:10)  T(F): 98.6 (06 May 2021 22:10), Max: 98.6 (06 May 2021 22:10)  HR: 95 (07 May 2021 07:00) (68 - 95)  BP: 112/67 (07 May 2021 07:00) (112/67 - 163/95)  BP(mean): 82 (07 May 2021 07:00) (80 - 123)  RR: 29 (07 May 2021 07:00) (9 - 29)  SpO2: 96% (07 May 2021 07:00) (96% - 100%)    Physical Exam:  General: NAD, resting comfortably in bed  Pulmonary: Nonlabored breathing, no respiratory distress  Cardiovascular: S1 S2 RRR no MRG  Abdominal: Soft, L paramedian incision with mild tenderness, nondistended; dressing c/d/i  Extremities: L2-S1 Motor 5/5; Sensation 5/5 L2-S1  Vascular: 2+ pulses DP/PT bilaterally    chlorhexidine 4% Liquid 1 Application(s) Topical <User Schedule>  dextrose 40% Gel 15 Gram(s) Oral once  dextrose 5%. 1000 milliLiter(s) IV Continuous <Continuous>  dextrose 5%. 1000 milliLiter(s) IV Continuous <Continuous>  dextrose 50% Injectable 25 Gram(s) IV Push once  dextrose 50% Injectable 12.5 Gram(s) IV Push once  dextrose 50% Injectable 25 Gram(s) IV Push once  glucagon  Injectable 1 milliGRAM(s) IntraMuscular once  HYDROmorphone  Injectable 0.5 milliGRAM(s) IV Push every 4 hours PRN  HYDROmorphone  Injectable 1 milliGRAM(s) IV Push every 4 hours PRN  insulin lispro (ADMELOG) corrective regimen sliding scale   SubCutaneous every 6 hours  lactated ringers. 1000 milliLiter(s) IV Continuous <Continuous>  ondansetron Injectable 4 milliGRAM(s) IV Push every 4 hours PRN  pantoprazole  Injectable 40 milliGRAM(s) IV Push daily

## 2021-05-07 NOTE — PROGRESS NOTE ADULT - SUBJECTIVE AND OBJECTIVE BOX
Orthopaedic Surgery Progress Note    Patient seen and examined. JAG overnight. Patient without complaints. Pain controlled. Denies CP, SOB, N/V, tactile fevers, calf pain.  Pt is POD #1 s/p ALIF L4-L5, stage 2 PSF aborted 2/2 intra op hypotension and O2 Sat  Pt is currently under SICU management, was extubated yesterday, PE protocol negative       Vital Signs Last 24 Hrs  T(C): 37 (07 May 2021 10:00), Max: 37 (06 May 2021 22:10)  T(F): 98.6 (07 May 2021 10:00), Max: 98.6 (06 May 2021 22:10)  HR: 83 (07 May 2021 10:00) (68 - 95)  BP: 113/64 (07 May 2021 10:00) (109/70 - 163/95)  BP(mean): 83 (07 May 2021 10:00) (80 - 123)  RR: 11 (07 May 2021 10:00) (9 - 29)  SpO2: 94% (07 May 2021 10:00) (88% - 100%)         CAPILLARY BLOOD GLUCOSE      POCT Blood Glucose.: 125 mg/dL (06 May 2021 21:07)  POCT Blood Glucose.: 108 mg/dL (06 May 2021 18:48)  POCT Blood Glucose.: 94 mg/dL (06 May 2021 13:04)                  Physical Exam:  Pt laying comfortably in bed, NAD.  Skin warm and well perfused, no visible erythema/ecchymoses.  Dressing C/D/I; abdomen soft and nontender  EHL/FHL/TA/GS 5/5 bilateral lower extremities, SLT in tact to distal bilateral lower extremities  Calves soft and nontender to palpation  DP pules palpable     LABS                        10.2   13.04 )-----------( 191      ( 07 May 2021 04:21 )             31.3                              PT/INR - ( 06 May 2021 12:37 )   PT: 14.1 sec;   INR: 1.18            05-07    140  |  108  |  13  ----------------------------<  119<H>  4.3   |  24  |  0.69    Ca    8.4      07 May 2021 04:21  Phos  4.1     05-07  Mg     2.2     05-07    TPro  5.9<L>  /  Alb  3.3  /  TBili  0.8  /  DBili  x   /  AST  25  /  ALT  23  /  AlkPhos  117  05-06            A/P: 65F POD #1 s/p ALIF L4-L5, for second stage PSF L3-5 on Monday     CONTINUE:        1. PT: OOB to chair   2. DVT prophylaxis: SCDs  3. Pain Control as needed   4. Dispo: Likely to be stepped down today , pre op for OR Monday

## 2021-05-07 NOTE — PROGRESS NOTE ADULT - SUBJECTIVE AND OBJECTIVE BOX
STATUS POST:  POD#1 s/p ALIF     SUBJECTIVE: Patient seen and examined bedside by vascular team. She is doing well, extubated overnight. denies abdominal pain or leg pain.        Vital Signs Last 24 Hrs  T(C): 36.9 (07 May 2021 06:00), Max: 37 (06 May 2021 22:10)  T(F): 98.5 (07 May 2021 06:00), Max: 98.6 (06 May 2021 22:10)  HR: 79 (07 May 2021 08:00) (68 - 95)  BP: 109/70 (07 May 2021 08:00) (109/70 - 163/95)  BP(mean): 82 (07 May 2021 08:00) (80 - 123)  RR: 10 (07 May 2021 08:00) (9 - 29)  SpO2: 95% (07 May 2021 08:00) (95% - 100%)  I&O's Detail    06 May 2021 07:01  -  07 May 2021 07:00  --------------------------------------------------------  IN:    FentaNYL: 24.2 mL    IV PiggyBack: 849.8 mL    Lactated Ringers: 2280 mL    Propofol: 122.8 mL  Total IN: 3276.8 mL    OUT:    Indwelling Catheter - Urethral (mL): 1280 mL  Total OUT: 1280 mL    Total NET: 1996.8 mL      07 May 2021 07:01  -  07 May 2021 08:30  --------------------------------------------------------  IN:    Lactated Ringers: 120 mL  Total IN: 120 mL    OUT:    Indwelling Catheter - Urethral (mL): 100 mL  Total OUT: 100 mL    Total NET: 20 mL    Physical Exam:  General: No acute distress, resting comfortably in bed  C/V: normal sinus rhythm  Pulm: Nonlabored breathing, no respiratory distress  Abd: soft, non-tender, non-distended, incisions clean/dry/intact.  Extrem: warm and well perfused, no edema, SCDs in place, 2+ palpable pulses distally    LABS:                        10.2   13.04 )-----------( 191      ( 07 May 2021 04:21 )             31.3     05-07    140  |  108  |  13  ----------------------------<  119<H>  4.3   |  24  |  0.69    Ca    8.4      07 May 2021 04:21  Phos  4.1     05-07  Mg     2.2     05-07    TPro  5.9<L>  /  Alb  3.3  /  TBili  0.8  /  DBili  x   /  AST  25  /  ALT  23  /  AlkPhos  117  05-06    PT/INR - ( 06 May 2021 12:37 )   PT: 14.1 sec;   INR: 1.18                RADIOLOGY & ADDITIONAL STUDIES:

## 2021-05-07 NOTE — DIETITIAN INITIAL EVALUATION ADULT. - OTHER INFO
65yoF HTN HLD BL hip Osteoarthritis, with c/o low back pain x >1 year 2/2 lumbar spondylolisthesis. S/p ALIF 5/6. Pt Taken from OR to CT for CTA PE then SCU d/t acute decrease in SBP. CT showing mild atelectasis bilaterally without evidence of PE. Pt extubated 5/6. Pt remains in CCU under SICU, possible step down downing. Planned for second stage PSF L3-5 on Monday. Seen alert in room. /70. Ordered for clears, per RN clears pending passing of gas (none thus far). Pt reports drinking some clears this AM, limited amount d/t nasuea, denies vomiting. Lower ABD pain noted. Regular diet PTA, good PO. NKFA, no issues chewing/swallowing (bedside dys screen passed). Admit wt 241 pounds, Pt Reports gaining wt during covid pandemic, exact amount gained not stated. Andres 15. 1+ Gen edema. No pressure ulcers.   Please see below for nutritions recommendations.

## 2021-05-08 DIAGNOSIS — Z09 ENCOUNTER FOR FOLLOW-UP EXAMINATION AFTER COMPLETED TREATMENT FOR CONDITIONS OTHER THAN MALIGNANT NEOPLASM: ICD-10-CM

## 2021-05-08 LAB
ANION GAP SERPL CALC-SCNC: 6 MMOL/L — SIGNIFICANT CHANGE UP (ref 5–17)
BLD GP AB SCN SERPL QL: NEGATIVE — SIGNIFICANT CHANGE UP
BUN SERPL-MCNC: 12 MG/DL — SIGNIFICANT CHANGE UP (ref 7–23)
CALCIUM SERPL-MCNC: 8.4 MG/DL — SIGNIFICANT CHANGE UP (ref 8.4–10.5)
CHLORIDE SERPL-SCNC: 106 MMOL/L — SIGNIFICANT CHANGE UP (ref 96–108)
CO2 SERPL-SCNC: 29 MMOL/L — SIGNIFICANT CHANGE UP (ref 22–31)
CREAT SERPL-MCNC: 0.74 MG/DL — SIGNIFICANT CHANGE UP (ref 0.5–1.3)
GLUCOSE SERPL-MCNC: 90 MG/DL — SIGNIFICANT CHANGE UP (ref 70–99)
HCT VFR BLD CALC: 31.6 % — LOW (ref 34.5–45)
HGB BLD-MCNC: 9.9 G/DL — LOW (ref 11.5–15.5)
MAGNESIUM SERPL-MCNC: 1.9 MG/DL — SIGNIFICANT CHANGE UP (ref 1.6–2.6)
MCHC RBC-ENTMCNC: 31 PG — SIGNIFICANT CHANGE UP (ref 27–34)
MCHC RBC-ENTMCNC: 31.3 GM/DL — LOW (ref 32–36)
MCV RBC AUTO: 99.1 FL — SIGNIFICANT CHANGE UP (ref 80–100)
NRBC # BLD: 0 /100 WBCS — SIGNIFICANT CHANGE UP (ref 0–0)
PHOSPHATE SERPL-MCNC: 2.4 MG/DL — LOW (ref 2.5–4.5)
PLATELET # BLD AUTO: 175 K/UL — SIGNIFICANT CHANGE UP (ref 150–400)
POTASSIUM SERPL-MCNC: 3.8 MMOL/L — SIGNIFICANT CHANGE UP (ref 3.5–5.3)
POTASSIUM SERPL-SCNC: 3.8 MMOL/L — SIGNIFICANT CHANGE UP (ref 3.5–5.3)
RBC # BLD: 3.19 M/UL — LOW (ref 3.8–5.2)
RBC # FLD: 14.6 % — HIGH (ref 10.3–14.5)
RH IG SCN BLD-IMP: POSITIVE — SIGNIFICANT CHANGE UP
SODIUM SERPL-SCNC: 141 MMOL/L — SIGNIFICANT CHANGE UP (ref 135–145)
WBC # BLD: 10.71 K/UL — HIGH (ref 3.8–10.5)
WBC # FLD AUTO: 10.71 K/UL — HIGH (ref 3.8–10.5)

## 2021-05-08 PROCEDURE — 72148 MRI LUMBAR SPINE W/O DYE: CPT | Mod: 26

## 2021-05-08 PROCEDURE — 99232 SBSQ HOSP IP/OBS MODERATE 35: CPT

## 2021-05-08 PROCEDURE — 72100 X-RAY EXAM L-S SPINE 2/3 VWS: CPT | Mod: 26

## 2021-05-08 RX ORDER — HYDROMORPHONE HYDROCHLORIDE 2 MG/ML
4 INJECTION INTRAMUSCULAR; INTRAVENOUS; SUBCUTANEOUS EVERY 4 HOURS
Refills: 0 | Status: DISCONTINUED | OUTPATIENT
Start: 2021-05-08 | End: 2021-05-10

## 2021-05-08 RX ORDER — HYDROMORPHONE HYDROCHLORIDE 2 MG/ML
2 INJECTION INTRAMUSCULAR; INTRAVENOUS; SUBCUTANEOUS EVERY 4 HOURS
Refills: 0 | Status: DISCONTINUED | OUTPATIENT
Start: 2021-05-08 | End: 2021-05-10

## 2021-05-08 RX ORDER — ENOXAPARIN SODIUM 100 MG/ML
40 INJECTION SUBCUTANEOUS EVERY 12 HOURS
Refills: 0 | Status: DISCONTINUED | OUTPATIENT
Start: 2021-05-08 | End: 2021-05-09

## 2021-05-08 RX ADMIN — HYDROMORPHONE HYDROCHLORIDE 0.5 MILLIGRAM(S): 2 INJECTION INTRAMUSCULAR; INTRAVENOUS; SUBCUTANEOUS at 16:55

## 2021-05-08 RX ADMIN — HYDROMORPHONE HYDROCHLORIDE 0.5 MILLIGRAM(S): 2 INJECTION INTRAMUSCULAR; INTRAVENOUS; SUBCUTANEOUS at 06:22

## 2021-05-08 RX ADMIN — HYDROMORPHONE HYDROCHLORIDE 0.5 MILLIGRAM(S): 2 INJECTION INTRAMUSCULAR; INTRAVENOUS; SUBCUTANEOUS at 06:38

## 2021-05-08 RX ADMIN — Medication 0.5 MILLIGRAM(S): at 10:06

## 2021-05-08 RX ADMIN — ENOXAPARIN SODIUM 40 MILLIGRAM(S): 100 INJECTION SUBCUTANEOUS at 22:48

## 2021-05-08 RX ADMIN — ENOXAPARIN SODIUM 40 MILLIGRAM(S): 100 INJECTION SUBCUTANEOUS at 10:06

## 2021-05-08 RX ADMIN — GABAPENTIN 100 MILLIGRAM(S): 400 CAPSULE ORAL at 12:44

## 2021-05-08 RX ADMIN — PANTOPRAZOLE SODIUM 40 MILLIGRAM(S): 20 TABLET, DELAYED RELEASE ORAL at 12:44

## 2021-05-08 RX ADMIN — HYDROMORPHONE HYDROCHLORIDE 0.5 MILLIGRAM(S): 2 INJECTION INTRAMUSCULAR; INTRAVENOUS; SUBCUTANEOUS at 17:15

## 2021-05-08 RX ADMIN — SODIUM CHLORIDE 75 MILLILITER(S): 9 INJECTION, SOLUTION INTRAVENOUS at 06:22

## 2021-05-08 NOTE — PHYSICAL THERAPY INITIAL EVALUATION ADULT - ADDITIONAL COMMENTS
independent prior to arrival, apartment, elevator, ramp access, has rolling walker from previous hip surgery, no falls in past year

## 2021-05-08 NOTE — PHYSICAL THERAPY INITIAL EVALUATION ADULT - LEVEL OF INDEPENDENCE: SUPINE/SIT, REHAB EVAL
slight dizziness, however VSS, see flow sheet/minimum assist (75% patients effort)/moderate assist (50% patients effort)

## 2021-05-08 NOTE — PHYSICAL THERAPY INITIAL EVALUATION ADULT - TRANSFER SKILLS, REHAB EVAL
straight cane occasionally/independent/needs device Patient requests all Lab and Radiology Results on their Discharge Instructions

## 2021-05-08 NOTE — PROGRESS NOTE ADULT - SUBJECTIVE AND OBJECTIVE BOX
STATUS POST:  POD#2 s/p ALIF     SUBJECTIVE: Patient seen and examined bedside by vascular team. She is doing well, slight nausea and incisional pain. no leg pain    enoxaparin Injectable 40 milliGRAM(s) SubCutaneous every 12 hours      Vital Signs Last 24 Hrs  T(C): 36.2 (08 May 2021 09:17), Max: 37.1 (07 May 2021 17:11)  T(F): 97.1 (08 May 2021 09:17), Max: 98.7 (07 May 2021 17:11)  HR: 90 (08 May 2021 09:17) (74 - 96)  BP: 132/82 (08 May 2021 09:17) (111/62 - 132/82)  BP(mean): 100 (07 May 2021 19:00) (81 - 100)  RR: 18 (08 May 2021 09:17) (10 - 23)  SpO2: 96% (08 May 2021 09:17) (92% - 96%)  I&O's Detail    07 May 2021 07:01  -  08 May 2021 07:00  --------------------------------------------------------  IN:    Lactated Ringers: 1050 mL    Lactated Ringers: 960 mL    Oral Fluid: 1050 mL  Total IN: 3060 mL    OUT:    Indwelling Catheter - Urethral (mL): 1025 mL  Total OUT: 1025 mL    Total NET: 2035 mL      08 May 2021 07:01  -  08 May 2021 14:03  --------------------------------------------------------  IN:    Lactated Ringers: 150 mL    Oral Fluid: 480 mL  Total IN: 630 mL    OUT:    Indwelling Catheter - Urethral (mL): 700 mL  Total OUT: 700 mL    Total NET: -70 mL          Physical Exam:  General: No acute distress, resting comfortably in bed  C/V: normal sinus rhythm  Pulm: Nonlabored breathing, no respiratory distress  Abd: soft, non-tender, non-distended, incisions clean/dry/intact.  Extrem: warm and well perfused, no edema, SCDs in place, 2+ palpable pulses distally      LABS:                        9.9    10.71 )-----------( 175      ( 08 May 2021 08:43 )             31.6     05-08    141  |  106  |  12  ----------------------------<  90  3.8   |  29  |  0.74    Ca    8.4      08 May 2021 08:43  Phos  2.4     05-08  Mg     1.9     05-08        Urinalysis Basic - ( 07 May 2021 09:46 )    Color: Yellow / Appearance: Clear / SG: >=1.030 / pH: x  Gluc: x / Ketone: NEGATIVE  / Bili: Negative / Urobili: 0.2 E.U./dL   Blood: x / Protein: Trace mg/dL / Nitrite: NEGATIVE   Leuk Esterase: NEGATIVE / RBC: > 10 /HPF / WBC 5-10 /HPF   Sq Epi: x / Non Sq Epi: 0-5 /HPF / Bacteria: Present /HPF        RADIOLOGY & ADDITIONAL STUDIES:

## 2021-05-08 NOTE — PROGRESS NOTE ADULT - SUBJECTIVE AND OBJECTIVE BOX
Orthopaedic Surgery Progress Note    Patient seen and examined. JAG overnight. Patient without complaints. Pain controlled. Denies CP, SOB, N/V, tactile fevers, calf pain.     Vital Signs Last 24 Hrs  T(C): 37.8 (08 May 2021 16:00), Max: 37.8 (08 May 2021 16:00)  T(F): 100 (08 May 2021 16:00), Max: 100 (08 May 2021 16:00)  HR: 99 (08 May 2021 16:00) (70 - 99)  BP: 153/99 (08 May 2021 16:00) (112/67 - 153/99)  BP(mean): 100 (07 May 2021 19:00) (84 - 100)  RR: 18 (08 May 2021 16:00) (17 - 23)  SpO2: 96% (08 May 2021 16:00) (92% - 96%)    Physical Exam:  Pt laying comfortably in bed, NAD.  Skin warm and well perfused, no visible erythema/ecchymoses.  Dressing C/D/I; abdomen soft and nontender  EHL/FHL/TA/GS 5/5 bilateral lower extremities, SLT in tact to distal bilateral lower extremities  Calves soft and nontender to palpation  DP pules palpable     A/P: 65F POD s/p ALIF L4-L5 on 5/6, transferred to SICU intubated post op due to intra op hypotension. CTA negative for PE. Has since been stepped down. Planning for for second stage PSF L3-5 on Monday   -PT: OOB to chair   -DVT prophylaxis: SCDs  -Pain Control as needed   -MRI lumbar spine for surgical planning  -AP and lateral lumbar spine x-rays  -Encourage incentive spirometry  -Med clearance for Monday  -OR Monday

## 2021-05-08 NOTE — PHYSICAL THERAPY INITIAL EVALUATION ADULT - GENERAL OBSERVATIONS, REHAB EVAL
Received supine complaints of abdominal pain 4/10 +EKG, IV hep, morales. Left in chair +RN Sarah lopez, call phillips

## 2021-05-08 NOTE — PHYSICAL THERAPY INITIAL EVALUATION ADULT - PERTINENT HX OF CURRENT PROBLEM, REHAB EVAL
65F c/o low back pain x >1 year. She reports pain in the low back that radiates to the right buttock. Denies radiating pain down the leg. Reports symptoms are worse in the AM. She has a history of treatment including Gabapentin, Aleve, hot compress, PT, without significant symptom relief. She has used a cane intermittently.

## 2021-05-08 NOTE — PROGRESS NOTE ADULT - SUBJECTIVE AND OBJECTIVE BOX
Interval Events: Reviewed  Patient seen and examined at bedside.    Patient is a 65y old  Female who presents with a chief complaint of low back pain (07 May 2021 12:16)  no acute event overnight, denies chest pain, sob      PAST MEDICAL & SURGICAL HISTORY:  Asthma  childhood    Hypertension    Obesity    Osteoarthritis of hips, bilateral    HLD (hyperlipidemia)    H/O breast surgery  bilateral lift    History of hip replacement, total, right  2016 right , left 2018        MEDICATIONS:  Pulmonary:    Antimicrobials:    Anticoagulants:  enoxaparin Injectable 40 milliGRAM(s) SubCutaneous daily    Cardiac:      Allergies    latex (Rash)  penicillin (Rash)    Intolerances        Vital Signs Last 24 Hrs  T(C): 37.1 (08 May 2021 06:16), Max: 37.1 (07 May 2021 17:11)  T(F): 98.7 (08 May 2021 06:16), Max: 98.7 (07 May 2021 17:11)  HR: 94 (08 May 2021 06:16) (74 - 96)  BP: 131/80 (08 May 2021 06:16) (102/64 - 131/80)  BP(mean): 100 (07 May 2021 19:00) (77 - 100)  RR: 20 (08 May 2021 06:16) (10 - 27)  SpO2: 95% (08 May 2021 06:16) (88% - 95%)    05-07 @ 07:01  -  05-08 @ 07:00  --------------------------------------------------------  IN: 3060 mL / OUT: 1025 mL / NET: 2035 mL          Review of Systems:   •	General: negative  •	Skin/Breast: negative  •	Ophthalmologic: negative  •	ENMT: negative  •	Respiratory and Thorax: negative  •	Cardiovascular: negative  •	Gastrointestinal: negative  •	Genitourinary: negative  •	Musculoskeletal: negative  •	Neurological: negative  •	Psychiatric: negative  •	Hematology/Lymphatics: negative  •	Endocrine: negative  •	Allergic/Immunologic: negative    Physical Exam:   • Constitutional:	Well-developed, well nourished  • Eyes:	EOMI; PERRL; no drainage or redness  • ENMT:	No oral lesions; no gross abnormalities  • Neck	no thyromegaly or nodules  • Breasts:	not examined  • Back:	No deformity or limitation of movement  • Respiratory:	Breath Sounds equal & clear to auscultation, no accessory muscle use  • Cardiovascular:	Regular rate & rhythm, normal S1, S2; no murmurs, gallops or rubs; no S3, S4  • Gastrointestinal:	Soft, non-tender, no hepatosplenomegaly, normal bowel sounds  • Genitourinary:	not examined  • Rectal: not examined  • Extremities:	No cyanosis, clubbing or edema  • Vascular:	Equal and normal pulses (dorsalis pedis)  • Neurologica:l	not examined  • Skin:	No lesions; no rash  • Lymph Nodes:	No lymphadedenopathy  • Musculoskeletal:	No joint pain, swelling or deformity; no limitation of movement        LABS:  ABG - ( 06 May 2021 13:01 )  pH, Arterial: 7.38  pH, Blood: x     /  pCO2: 39    /  pO2: 193   / HCO3: 23    / Base Excess: -1.8  /  SaO2: 99.6                CBC Full  -  ( 07 May 2021 04:21 )  WBC Count : 13.04 K/uL  RBC Count : 3.28 M/uL  Hemoglobin : 10.2 g/dL  Hematocrit : 31.3 %  Platelet Count - Automated : 191 K/uL  Mean Cell Volume : 95.4 fl  Mean Cell Hemoglobin : 31.1 pg  Mean Cell Hemoglobin Concentration : 32.6 gm/dL  Auto Neutrophil # : x  Auto Lymphocyte # : x  Auto Monocyte # : x  Auto Eosinophil # : x  Auto Basophil # : x  Auto Neutrophil % : x  Auto Lymphocyte % : x  Auto Monocyte % : x  Auto Eosinophil % : x  Auto Basophil % : x    05-07    140  |  108  |  13  ----------------------------<  119<H>  4.3   |  24  |  0.69    Ca    8.4      07 May 2021 04:21  Phos  4.1     05-07  Mg     2.2     05-07    TPro  5.9<L>  /  Alb  3.3  /  TBili  0.8  /  DBili  x   /  AST  25  /  ALT  23  /  AlkPhos  117  05-06    PT/INR - ( 06 May 2021 12:37 )   PT: 14.1 sec;   INR: 1.18                Urinalysis Basic - ( 07 May 2021 09:46 )    Color: Yellow / Appearance: Clear / SG: >=1.030 / pH: x  Gluc: x / Ketone: NEGATIVE  / Bili: Negative / Urobili: 0.2 E.U./dL   Blood: x / Protein: Trace mg/dL / Nitrite: NEGATIVE   Leuk Esterase: NEGATIVE / RBC: > 10 /HPF / WBC 5-10 /HPF   Sq Epi: x / Non Sq Epi: 0-5 /HPF / Bacteria: Present /HPF                  RADIOLOGY & ADDITIONAL STUDIES (The following images were personally reviewed):  Frank:                                     No  Urine output:                       adequate  DVT prophylaxis:                 Yes  Flattus:                                  Yes  Bowel movement:              No

## 2021-05-09 ENCOUNTER — TRANSCRIPTION ENCOUNTER (OUTPATIENT)
Age: 66
End: 2021-05-09

## 2021-05-09 DIAGNOSIS — Z01.818 ENCOUNTER FOR OTHER PREPROCEDURAL EXAMINATION: ICD-10-CM

## 2021-05-09 LAB
ANION GAP SERPL CALC-SCNC: 8 MMOL/L — SIGNIFICANT CHANGE UP (ref 5–17)
BUN SERPL-MCNC: 10 MG/DL — SIGNIFICANT CHANGE UP (ref 7–23)
CALCIUM SERPL-MCNC: 8.5 MG/DL — SIGNIFICANT CHANGE UP (ref 8.4–10.5)
CHLORIDE SERPL-SCNC: 104 MMOL/L — SIGNIFICANT CHANGE UP (ref 96–108)
CO2 SERPL-SCNC: 29 MMOL/L — SIGNIFICANT CHANGE UP (ref 22–31)
CREAT SERPL-MCNC: 0.67 MG/DL — SIGNIFICANT CHANGE UP (ref 0.5–1.3)
GLUCOSE SERPL-MCNC: 86 MG/DL — SIGNIFICANT CHANGE UP (ref 70–99)
HCT VFR BLD CALC: 31.4 % — LOW (ref 34.5–45)
HGB BLD-MCNC: 10 G/DL — LOW (ref 11.5–15.5)
MAGNESIUM SERPL-MCNC: 1.8 MG/DL — SIGNIFICANT CHANGE UP (ref 1.6–2.6)
MCHC RBC-ENTMCNC: 31.1 PG — SIGNIFICANT CHANGE UP (ref 27–34)
MCHC RBC-ENTMCNC: 31.8 GM/DL — LOW (ref 32–36)
MCV RBC AUTO: 97.5 FL — SIGNIFICANT CHANGE UP (ref 80–100)
NRBC # BLD: 0 /100 WBCS — SIGNIFICANT CHANGE UP (ref 0–0)
PHOSPHATE SERPL-MCNC: 2.7 MG/DL — SIGNIFICANT CHANGE UP (ref 2.5–4.5)
PLATELET # BLD AUTO: 173 K/UL — SIGNIFICANT CHANGE UP (ref 150–400)
POTASSIUM SERPL-MCNC: 3.7 MMOL/L — SIGNIFICANT CHANGE UP (ref 3.5–5.3)
POTASSIUM SERPL-SCNC: 3.7 MMOL/L — SIGNIFICANT CHANGE UP (ref 3.5–5.3)
RBC # BLD: 3.22 M/UL — LOW (ref 3.8–5.2)
RBC # FLD: 14.4 % — SIGNIFICANT CHANGE UP (ref 10.3–14.5)
SARS-COV-2 RNA SPEC QL NAA+PROBE: SIGNIFICANT CHANGE UP
SODIUM SERPL-SCNC: 141 MMOL/L — SIGNIFICANT CHANGE UP (ref 135–145)
WBC # BLD: 10.24 K/UL — SIGNIFICANT CHANGE UP (ref 3.8–10.5)
WBC # FLD AUTO: 10.24 K/UL — SIGNIFICANT CHANGE UP (ref 3.8–10.5)

## 2021-05-09 PROCEDURE — 99232 SBSQ HOSP IP/OBS MODERATE 35: CPT

## 2021-05-09 RX ORDER — SENNA PLUS 8.6 MG/1
2 TABLET ORAL AT BEDTIME
Refills: 0 | Status: DISCONTINUED | OUTPATIENT
Start: 2021-05-09 | End: 2021-05-10

## 2021-05-09 RX ORDER — POVIDONE-IODINE 5 %
1 AEROSOL (ML) TOPICAL ONCE
Refills: 0 | Status: DISCONTINUED | OUTPATIENT
Start: 2021-05-09 | End: 2021-05-18

## 2021-05-09 RX ORDER — PANTOPRAZOLE SODIUM 20 MG/1
40 TABLET, DELAYED RELEASE ORAL
Refills: 0 | Status: DISCONTINUED | OUTPATIENT
Start: 2021-05-09 | End: 2021-05-10

## 2021-05-09 RX ORDER — CHLORHEXIDINE GLUCONATE 213 G/1000ML
1 SOLUTION TOPICAL EVERY 12 HOURS
Refills: 0 | Status: COMPLETED | OUTPATIENT
Start: 2021-05-09 | End: 2021-05-10

## 2021-05-09 RX ORDER — MAGNESIUM HYDROXIDE 400 MG/1
30 TABLET, CHEWABLE ORAL DAILY
Refills: 0 | Status: DISCONTINUED | OUTPATIENT
Start: 2021-05-09 | End: 2021-05-10

## 2021-05-09 RX ADMIN — ENOXAPARIN SODIUM 40 MILLIGRAM(S): 100 INJECTION SUBCUTANEOUS at 09:36

## 2021-05-09 RX ADMIN — PANTOPRAZOLE SODIUM 40 MILLIGRAM(S): 20 TABLET, DELAYED RELEASE ORAL at 11:19

## 2021-05-09 RX ADMIN — MAGNESIUM HYDROXIDE 30 MILLILITER(S): 400 TABLET, CHEWABLE ORAL at 11:19

## 2021-05-09 RX ADMIN — HYDROMORPHONE HYDROCHLORIDE 4 MILLIGRAM(S): 2 INJECTION INTRAMUSCULAR; INTRAVENOUS; SUBCUTANEOUS at 09:37

## 2021-05-09 RX ADMIN — HYDROMORPHONE HYDROCHLORIDE 4 MILLIGRAM(S): 2 INJECTION INTRAMUSCULAR; INTRAVENOUS; SUBCUTANEOUS at 10:15

## 2021-05-09 RX ADMIN — CHLORHEXIDINE GLUCONATE 1 APPLICATION(S): 213 SOLUTION TOPICAL at 17:19

## 2021-05-09 RX ADMIN — SENNA PLUS 2 TABLET(S): 8.6 TABLET ORAL at 22:19

## 2021-05-09 RX ADMIN — GABAPENTIN 100 MILLIGRAM(S): 400 CAPSULE ORAL at 11:19

## 2021-05-09 NOTE — PROGRESS NOTE ADULT - PROBLEM SELECTOR PLAN 2
continue home med; triamterene -hctz 37.5/25 daily
continue home med; triamterene -hctz 37.5/25 daily

## 2021-05-09 NOTE — PROGRESS NOTE ADULT - PROBLEM SELECTOR PLAN 6
The patient's medical condition is optimized for surgery.  There is no contraindication for surgery.  There is no clinical evidence neither of angina, decompensated CHF, arrhthymias, nor valvular disease.   There is no limitation of exercise capacity.  MET is 3 .  ASA class is 3 .  Breaux cardiac risk factor is  0.34% .  DVT prophylaxis is indicated.  Pain control.  Early mobilization.  Avoid fluid overload.

## 2021-05-09 NOTE — PROGRESS NOTE ADULT - SUBJECTIVE AND OBJECTIVE BOX
Orthopaedic Surgery Progress Note    Patient seen and examined. JAG overnight. Patient without complaints. Pain controlled. Denies CP, SOB, N/V, tactile fevers, calf pain.     Vital Signs Last 24 Hrs  T(C): 37.8 (08 May 2021 16:00), Max: 37.8 (08 May 2021 16:00)  T(F): 100 (08 May 2021 16:00), Max: 100 (08 May 2021 16:00)  HR: 99 (08 May 2021 16:00) (70 - 99)  BP: 153/99 (08 May 2021 16:00) (112/67 - 153/99)  BP(mean): 100 (07 May 2021 19:00) (84 - 100)  RR: 18 (08 May 2021 16:00) (17 - 23)  SpO2: 96% (08 May 2021 16:00) (92% - 96%)    Physical Exam:  Pt laying comfortably in bed, NAD.  Skin warm and well perfused, no visible erythema/ecchymoses.  Dressing C/D/I; abdomen soft and nontender  EHL/FHL/TA/GS 5/5 bilateral lower extremities, SLT in tact to distal bilateral lower extremities  Calves soft and nontender to palpation  DP pules palpable     A/P: 65F POD s/p ALIF L4-L5 on 5/6, transferred to SICU intubated post op due to intra op hypotension. CTA negative for PE. Has since been stepped down. Planning for for second stage PSF L3-5 on Monday   -PT: OOB to chair   -DVT prophylaxis: SCDs  -Pain Control as needed   -MRI lumbar spine done for surgical planning  -AP and lateral lumbar spine x-rays done  -Encourage incentive spirometry  -Med clearance for Monday  -OR Monday

## 2021-05-09 NOTE — PROGRESS NOTE ADULT - ASSESSMENT
Assessment: 64y/o F with past medical history significant for HTN, HLD, childhood asthma, bilateral hip osteoarthritis s/p bilateral hip replacements (L 2019, R 2016), low back who presents on 5/6/21 for elective Stage 1 ALIF L4-L5 and Stage 2 Laminectomy/PSF L3-L5. Intra-operatively the patient was undergoing the anterior spinal approach, however she had a sudden decline in her vital signs, with a decrease in blood pressure to 50s systolic, with oxygen desaturation to 91%, ETCO2 30 to 20. Her vital signs recovered, however the posterior portion of the surgery was aborted. SICU was consulted as the patient was transferred to SICU intubated, with plan for CTA PE protocol prior to rule out pulmonary embolism. CT PE negative; TTE wnl. Now extubated and HDS.    Plan:   Neuro: Pain PRN; extubated 5/6  CV: Maintain MAP >65; HD stable, EKG wnl, TTE wnl; SBPs 110s-160s  Pulm: Extubated 5/6;  CTA negative for saddle PE with enlarged pulmonary artery noted; b/l atelectasis - encourage IS  GI/FEN: CLD, PPI, LR@120/hr; ADAT with return of bowel function  : Monika   Endo: ISS  ID: Rosanne-op Ancef x24 hrs  Heme: Maintain hgb >7  PPx: SCDs, holding SQH  L/D/T: jonny Gonzáles (5/6--)   PT/OT: Not ordered; OOB to chair  Dispo: Step down today     Assessment: 66y/o F with past medical history significant for HTN, HLD, childhood asthma, bilateral hip osteoarthritis s/p bilateral hip replacements (L 2019, R 2016), low back who presents on 5/6/21 for elective Stage 1 ALIF L4-L5 and Stage 2 Laminectomy/PSF L3-L5. Intra-operatively the patient was undergoing the anterior spinal approach, however she had a sudden decline in her vital signs, with a decrease in blood pressure to 50s systolic, with oxygen desaturation to 91%, ETCO2 30 to 20. Her vital signs recovered, however the posterior portion of the surgery was aborted. SICU was consulted as the patient was transferred to SICU intubated, with plan for CTA PE protocol prior to rule out pulmonary embolism. CT PE negative; TTE wnl, EF 65% . Now extubated and HDS.    Plan:   Neuro: Pain PRN; extubated 5/6  CV: Maintain MAP >65; HD stable, EKG wnl, TTE wnl; SBPs 110s-160s  Pulm: Extubated 5/6;  CTA negative for saddle PE with enlarged pulmonary artery noted; b/l atelectasis - encourage IS  GI/FEN: CLD, PPI, LR@120/hr; ADAT with return of bowel function  : Monika   Endo: ISS  ID: Rosanne-op Ancef x24 hrs  Heme: Maintain hgb >7  PPx: SCDs, holding SQH  L/D/T: jonny Gonzáles (5/6--)   PT/OT: Not ordered; OOB to chair  Dispo: Step down today

## 2021-05-09 NOTE — PROGRESS NOTE ADULT - SUBJECTIVE AND OBJECTIVE BOX
Interval Events: Reviewed  Patient seen and examined at bedside.    Patient is a 65y old  Female who presents with a chief complaint of low back pain (08 May 2021 16:41)  no acute event overnight, 2L NC 96 %  got OOB chair yesterday      PAST MEDICAL & SURGICAL HISTORY:  Asthma  childhood    Hypertension    Obesity    Osteoarthritis of hips, bilateral    HLD (hyperlipidemia)    H/O breast surgery  bilateral lift    History of hip replacement, total, right  2016 right , left 2018        MEDICATIONS:  Pulmonary:    Antimicrobials:    Anticoagulants:  enoxaparin Injectable 40 milliGRAM(s) SubCutaneous every 12 hours    Cardiac:      Allergies    latex (Rash)  penicillin (Rash)    Intolerances        Vital Signs Last 24 Hrs  T(C): 37.7 (09 May 2021 05:46), Max: 37.8 (08 May 2021 16:00)  T(F): 99.8 (09 May 2021 05:46), Max: 100 (08 May 2021 16:00)  HR: 99 (09 May 2021 05:46) (70 - 114)  BP: 142/83 (09 May 2021 05:46) (132/71 - 153/99)  BP(mean): --  RR: 17 (09 May 2021 05:46) (15 - 18)  SpO2: 95% (09 May 2021 05:46) (90% - 96%)    05-08 @ 07:01  -  05-09 @ 07:00  --------------------------------------------------------  IN: 1425 mL / OUT: 2250 mL / NET: -825 mL          Review of Systems:   •	General: negative  •	Skin/Breast: negative  •	Ophthalmologic: negative  •	ENMT: negative  •	Respiratory and Thorax: negative  •	Cardiovascular: negative  •	Gastrointestinal: negative  •	Genitourinary: negative  •	Musculoskeletal: negative  •	Neurological: negative  •	Psychiatric: negative  •	Hematology/Lymphatics: negative  •	Endocrine: negative  •	Allergic/Immunologic: negative    Physical Exam:   • Constitutional:	Well-developed, well nourished  • Eyes:	EOMI; PERRL; no drainage or redness  • ENMT:	No oral lesions; no gross abnormalities  • Neck	no thyromegaly or nodules  • Breasts:	not examined  • Back:	No deformity or limitation of movement  • Respiratory:	Breath Sounds equal & clear to auscultation, no accessory muscle use  • Cardiovascular:	Regular rate & rhythm, normal S1, S2; no murmurs, gallops or rubs; no S3, S4  • Gastrointestinal:	Soft, non-tender, no hepatosplenomegaly, normal bowel sounds  • Genitourinary:	not examined  • Rectal: not examined  • Extremities:	No cyanosis, clubbing or edema  • Vascular:	Equal and normal pulses (dorsalis pedis)  • Neurologica:l	not examined  • Skin:	No lesions; no rash  • Lymph Nodes:	No lymphadedenopathy  • Musculoskeletal:	No joint pain, swelling or deformity; no limitation of movement        LABS:      CBC Full  -  ( 08 May 2021 08:43 )  WBC Count : 10.71 K/uL  RBC Count : 3.19 M/uL  Hemoglobin : 9.9 g/dL  Hematocrit : 31.6 %  Platelet Count - Automated : 175 K/uL  Mean Cell Volume : 99.1 fl  Mean Cell Hemoglobin : 31.0 pg  Mean Cell Hemoglobin Concentration : 31.3 gm/dL  Auto Neutrophil # : x  Auto Lymphocyte # : x  Auto Monocyte # : x  Auto Eosinophil # : x  Auto Basophil # : x  Auto Neutrophil % : x  Auto Lymphocyte % : x  Auto Monocyte % : x  Auto Eosinophil % : x  Auto Basophil % : x    05-08    141  |  106  |  12  ----------------------------<  90  3.8   |  29  |  0.74    Ca    8.4      08 May 2021 08:43  Phos  2.4     05-08  Mg     1.9     05-08            Urinalysis Basic - ( 07 May 2021 09:46 )    Color: Yellow / Appearance: Clear / SG: >=1.030 / pH: x  Gluc: x / Ketone: NEGATIVE  / Bili: Negative / Urobili: 0.2 E.U./dL   Blood: x / Protein: Trace mg/dL / Nitrite: NEGATIVE   Leuk Esterase: NEGATIVE / RBC: > 10 /HPF / WBC 5-10 /HPF   Sq Epi: x / Non Sq Epi: 0-5 /HPF / Bacteria: Present /HPF                  RADIOLOGY & ADDITIONAL STUDIES (The following images were personally reviewed):  Frank:                                     No  Urine output:                       adequate  DVT prophylaxis:                 Yes  Flattus:                                  Yes  Bowel movement:              No   Interval Events: Reviewed  Patient seen and examined at bedside.    Patient is a 65y old  Female who presents with a chief complaint of low back pain (08 May 2021 16:41)  no acute event overnight, 2L NC 96 %  got OOB chair yesterday      PAST MEDICAL & SURGICAL HISTORY:  Asthma  childhood    Hypertension    Obesity    Osteoarthritis of hips, bilateral    HLD (hyperlipidemia)    H/O breast surgery  bilateral lift    History of hip replacement, total, right  2016 right , left 2018        MEDICATIONS:  Pulmonary:    Antimicrobials:    Anticoagulants:  enoxaparin Injectable 40 milliGRAM(s) SubCutaneous every 12 hours    Cardiac:      Allergies    latex (Rash)  penicillin (Rash)    Intolerances        Vital Signs Last 24 Hrs  T(C): 37.7 (09 May 2021 05:46), Max: 37.8 (08 May 2021 16:00)  T(F): 99.8 (09 May 2021 05:46), Max: 100 (08 May 2021 16:00)  HR: 99 (09 May 2021 05:46) (70 - 114)  BP: 142/83 (09 May 2021 05:46) (132/71 - 153/99)  BP(mean): --  RR: 17 (09 May 2021 05:46) (15 - 18)  SpO2: 95% (09 May 2021 05:46) (90% - 96%)    05-08 @ 07:01  -  05-09 @ 07:00  --------------------------------------------------------  IN: 1425 mL / OUT: 2250 mL / NET: -825 mL          Review of Systems:   •	General: negative  •	Skin/Breast: negative  •	Ophthalmologic: negative  •	ENMT: negative  •	Respiratory and Thorax: negative  •	Cardiovascular: negative  •	Gastrointestinal: negative  •	Genitourinary: negative  •	Musculoskeletal: negative  •	Neurological: negative  •	Psychiatric: negative  •	Hematology/Lymphatics: negative  •	Endocrine: negative  •	Allergic/Immunologic: negative    Physical Exam:   • Constitutional:	Well-developed, well nourished  • Eyes:	EOMI; PERRL; no drainage or redness  • ENMT:	No oral lesions; no gross abnormalities  • Neck	no thyromegaly or nodules  • Breasts:	not examined  • Back:	No deformity or limitation of movement  • Respiratory:	Breath Sounds equal & clear to auscultation, no accessory muscle use  • Cardiovascular:	Regular rate & rhythm, normal S1, S2; no murmurs, gallops or rubs; no S3, S4  • Gastrointestinal:	Soft, non-tender, no hepatosplenomegaly, normal bowel sounds  • Genitourinary:	not examined  • Rectal: not examined  • Extremities:	No cyanosis, clubbing or edema  • Vascular:	Equal and normal pulses (dorsalis pedis)  • Neurologica:l	not examined  • Skin:	No lesions; no rash  • Lymph Nodes:	No lymphadedenopathy  • Musculoskeletal:	No joint pain, swelling or deformity; no limitation of movement        LABS:      CBC Full  -  ( 08 May 2021 08:43 )  WBC Count : 10.71 K/uL  RBC Count : 3.19 M/uL  Hemoglobin : 9.9 g/dL  Hematocrit : 31.6 %  Platelet Count - Automated : 175 K/uL  Mean Cell Volume : 99.1 fl  Mean Cell Hemoglobin : 31.0 pg  Mean Cell Hemoglobin Concentration : 31.3 gm/dL  Auto Neutrophil # : x  Auto Lymphocyte # : x  Auto Monocyte # : x  Auto Eosinophil # : x  Auto Basophil # : x  Auto Neutrophil % : x  Auto Lymphocyte % : x  Auto Monocyte % : x  Auto Eosinophil % : x  Auto Basophil % : x    05-08    141  |  106  |  12  ----------------------------<  90  3.8   |  29  |  0.74    Ca    8.4      08 May 2021 08:43  Phos  2.4     05-08  Mg     1.9     05-08            Urinalysis Basic - ( 07 May 2021 09:46 )    Color: Yellow / Appearance: Clear / SG: >=1.030 / pH: x  Gluc: x / Ketone: NEGATIVE  / Bili: Negative / Urobili: 0.2 E.U./dL   Blood: x / Protein: Trace mg/dL / Nitrite: NEGATIVE   Leuk Esterase: NEGATIVE / RBC: > 10 /HPF / WBC 5-10 /HPF   Sq Epi: x / Non Sq Epi: 0-5 /HPF / Bacteria: Present /HPF                  RADIOLOGY & ADDITIONAL STUDIES (The following images were personally reviewed):  Frank:                                     No  Urine output:                       adequate  DVT prophylaxis:                 Yes  Flattus:                                  Yes  Bowel movement:              No    [id number]  NSR 71 bpm, no ST elevation    < from: TTE Echo Complete w/o Contrast w/ Doppler (21 @ 13:44) >  CONCLUSIONS:     1. Normal left and right ventricular size and systolic function.   2. Aortic sclerosis without significant stenosis.   3. No evidence of pulmonary hypertension, pulmonary artery systolic pressure is 25 mmHg.   4. No pericardial effusion.   5. No prior echo is available for comparison.    --------------------------------------------------------------------------------    < end of copied text >

## 2021-05-09 NOTE — PROGRESS NOTE ADULT - PROBLEM SELECTOR PLAN 1
s/p ALIF L4-L5, stage 2 PSF aborted 2/2 intra op hypotension and O2 Sat 5/6/21,  CTA, negative PE   second stage PSF L3-5 on Monday
s/p ALIF L4-L5, stage 2 PSF aborted 2/2 intra op hypotension and O2 Sat 5/6/21,  CTA, negative PE   second stage PSF L3-5 on Monday

## 2021-05-09 NOTE — PROGRESS NOTE ADULT - PROBLEM SELECTOR PLAN 3
continue home med; pt will try to give us the name, dosages
continue home med; pt will try to give us the name, dosages

## 2021-05-10 ENCOUNTER — APPOINTMENT (OUTPATIENT)
Dept: ORTHOPEDIC SURGERY | Facility: HOSPITAL | Age: 66
End: 2021-05-10

## 2021-05-10 LAB
ANION GAP SERPL CALC-SCNC: 12 MMOL/L — SIGNIFICANT CHANGE UP (ref 5–17)
ANION GAP SERPL CALC-SCNC: 8 MMOL/L — SIGNIFICANT CHANGE UP (ref 5–17)
BASOPHILS # BLD AUTO: 0.02 K/UL — SIGNIFICANT CHANGE UP (ref 0–0.2)
BASOPHILS NFR BLD AUTO: 0.2 % — SIGNIFICANT CHANGE UP (ref 0–2)
BUN SERPL-MCNC: 11 MG/DL — SIGNIFICANT CHANGE UP (ref 7–23)
BUN SERPL-MCNC: 9 MG/DL — SIGNIFICANT CHANGE UP (ref 7–23)
CALCIUM SERPL-MCNC: 8.9 MG/DL — SIGNIFICANT CHANGE UP (ref 8.4–10.5)
CALCIUM SERPL-MCNC: 9 MG/DL — SIGNIFICANT CHANGE UP (ref 8.4–10.5)
CHLORIDE SERPL-SCNC: 102 MMOL/L — SIGNIFICANT CHANGE UP (ref 96–108)
CHLORIDE SERPL-SCNC: 102 MMOL/L — SIGNIFICANT CHANGE UP (ref 96–108)
CO2 SERPL-SCNC: 26 MMOL/L — SIGNIFICANT CHANGE UP (ref 22–31)
CO2 SERPL-SCNC: 31 MMOL/L — SIGNIFICANT CHANGE UP (ref 22–31)
CREAT SERPL-MCNC: 0.57 MG/DL — SIGNIFICANT CHANGE UP (ref 0.5–1.3)
CREAT SERPL-MCNC: 0.69 MG/DL — SIGNIFICANT CHANGE UP (ref 0.5–1.3)
EOSINOPHIL # BLD AUTO: 0.12 K/UL — SIGNIFICANT CHANGE UP (ref 0–0.5)
EOSINOPHIL NFR BLD AUTO: 1 % — SIGNIFICANT CHANGE UP (ref 0–6)
GLUCOSE SERPL-MCNC: 110 MG/DL — HIGH (ref 70–99)
GLUCOSE SERPL-MCNC: 92 MG/DL — SIGNIFICANT CHANGE UP (ref 70–99)
HCT VFR BLD CALC: 30.6 % — LOW (ref 34.5–45)
HCT VFR BLD CALC: 30.9 % — LOW (ref 34.5–45)
HGB BLD-MCNC: 9.8 G/DL — LOW (ref 11.5–15.5)
HGB BLD-MCNC: 9.9 G/DL — LOW (ref 11.5–15.5)
IMM GRANULOCYTES NFR BLD AUTO: 1.6 % — HIGH (ref 0–1.5)
LYMPHOCYTES # BLD AUTO: 1.42 K/UL — SIGNIFICANT CHANGE UP (ref 1–3.3)
LYMPHOCYTES # BLD AUTO: 11.4 % — LOW (ref 13–44)
MAGNESIUM SERPL-MCNC: 1.9 MG/DL — SIGNIFICANT CHANGE UP (ref 1.6–2.6)
MCHC RBC-ENTMCNC: 30.2 PG — SIGNIFICANT CHANGE UP (ref 27–34)
MCHC RBC-ENTMCNC: 30.6 PG — SIGNIFICANT CHANGE UP (ref 27–34)
MCHC RBC-ENTMCNC: 31.7 GM/DL — LOW (ref 32–36)
MCHC RBC-ENTMCNC: 32.4 GM/DL — SIGNIFICANT CHANGE UP (ref 32–36)
MCV RBC AUTO: 94.4 FL — SIGNIFICANT CHANGE UP (ref 80–100)
MCV RBC AUTO: 95.4 FL — SIGNIFICANT CHANGE UP (ref 80–100)
MONOCYTES # BLD AUTO: 0.37 K/UL — SIGNIFICANT CHANGE UP (ref 0–0.9)
MONOCYTES NFR BLD AUTO: 3 % — SIGNIFICANT CHANGE UP (ref 2–14)
NEUTROPHILS # BLD AUTO: 10.32 K/UL — HIGH (ref 1.8–7.4)
NEUTROPHILS NFR BLD AUTO: 82.8 % — HIGH (ref 43–77)
NRBC # BLD: 0 /100 WBCS — SIGNIFICANT CHANGE UP (ref 0–0)
NRBC # BLD: 0 /100 WBCS — SIGNIFICANT CHANGE UP (ref 0–0)
PHOSPHATE SERPL-MCNC: 3.2 MG/DL — SIGNIFICANT CHANGE UP (ref 2.5–4.5)
PLATELET # BLD AUTO: 185 K/UL — SIGNIFICANT CHANGE UP (ref 150–400)
PLATELET # BLD AUTO: 187 K/UL — SIGNIFICANT CHANGE UP (ref 150–400)
POTASSIUM SERPL-MCNC: 3.9 MMOL/L — SIGNIFICANT CHANGE UP (ref 3.5–5.3)
POTASSIUM SERPL-MCNC: 4.1 MMOL/L — SIGNIFICANT CHANGE UP (ref 3.5–5.3)
POTASSIUM SERPL-SCNC: 3.9 MMOL/L — SIGNIFICANT CHANGE UP (ref 3.5–5.3)
POTASSIUM SERPL-SCNC: 4.1 MMOL/L — SIGNIFICANT CHANGE UP (ref 3.5–5.3)
RBC # BLD: 3.24 M/UL — LOW (ref 3.8–5.2)
RBC # BLD: 3.24 M/UL — LOW (ref 3.8–5.2)
RBC # FLD: 13.9 % — SIGNIFICANT CHANGE UP (ref 10.3–14.5)
RBC # FLD: 14.3 % — SIGNIFICANT CHANGE UP (ref 10.3–14.5)
SODIUM SERPL-SCNC: 140 MMOL/L — SIGNIFICANT CHANGE UP (ref 135–145)
SODIUM SERPL-SCNC: 141 MMOL/L — SIGNIFICANT CHANGE UP (ref 135–145)
WBC # BLD: 12.45 K/UL — HIGH (ref 3.8–10.5)
WBC # BLD: 9.48 K/UL — SIGNIFICANT CHANGE UP (ref 3.8–10.5)
WBC # FLD AUTO: 12.45 K/UL — HIGH (ref 3.8–10.5)
WBC # FLD AUTO: 9.48 K/UL — SIGNIFICANT CHANGE UP (ref 3.8–10.5)

## 2021-05-10 PROCEDURE — 61783 SCAN PROC SPINAL: CPT | Mod: 58

## 2021-05-10 PROCEDURE — 22612 ARTHRD PST TQ 1NTRSPC LUMBAR: CPT | Mod: 58,62

## 2021-05-10 PROCEDURE — 22840 INSERT SPINE FIXATION DEVICE: CPT | Mod: 58

## 2021-05-10 PROCEDURE — 63047 LAM FACETEC & FORAMOT LUMBAR: CPT | Mod: 58,62

## 2021-05-10 RX ORDER — DEXTROSE 50 % IN WATER 50 %
12.5 SYRINGE (ML) INTRAVENOUS ONCE
Refills: 0 | Status: DISCONTINUED | OUTPATIENT
Start: 2021-05-10 | End: 2021-05-13

## 2021-05-10 RX ORDER — METHOCARBAMOL 500 MG/1
500 TABLET, FILM COATED ORAL THREE TIMES A DAY
Refills: 0 | Status: DISCONTINUED | OUTPATIENT
Start: 2021-05-10 | End: 2021-05-18

## 2021-05-10 RX ORDER — HYDROMORPHONE HYDROCHLORIDE 2 MG/ML
0.5 INJECTION INTRAMUSCULAR; INTRAVENOUS; SUBCUTANEOUS
Refills: 0 | Status: DISCONTINUED | OUTPATIENT
Start: 2021-05-10 | End: 2021-05-10

## 2021-05-10 RX ORDER — SUGAMMADEX 100 MG/ML
500 INJECTION, SOLUTION INTRAVENOUS ONCE
Refills: 0 | Status: DISCONTINUED | OUTPATIENT
Start: 2021-05-10 | End: 2021-05-18

## 2021-05-10 RX ORDER — GLUCAGON INJECTION, SOLUTION 0.5 MG/.1ML
1 INJECTION, SOLUTION SUBCUTANEOUS ONCE
Refills: 0 | Status: DISCONTINUED | OUTPATIENT
Start: 2021-05-10 | End: 2021-05-18

## 2021-05-10 RX ORDER — DEXAMETHASONE 0.5 MG/5ML
6 ELIXIR ORAL EVERY 6 HOURS
Refills: 0 | Status: DISCONTINUED | OUTPATIENT
Start: 2021-05-10 | End: 2021-05-18

## 2021-05-10 RX ORDER — DEXTROSE 50 % IN WATER 50 %
15 SYRINGE (ML) INTRAVENOUS ONCE
Refills: 0 | Status: DISCONTINUED | OUTPATIENT
Start: 2021-05-10 | End: 2021-05-13

## 2021-05-10 RX ORDER — SODIUM CHLORIDE 9 MG/ML
1000 INJECTION, SOLUTION INTRAVENOUS
Refills: 0 | Status: DISCONTINUED | OUTPATIENT
Start: 2021-05-10 | End: 2021-05-12

## 2021-05-10 RX ORDER — ONDANSETRON 8 MG/1
4 TABLET, FILM COATED ORAL EVERY 6 HOURS
Refills: 0 | Status: COMPLETED | OUTPATIENT
Start: 2021-05-10 | End: 2021-05-12

## 2021-05-10 RX ORDER — POLYETHYLENE GLYCOL 3350 17 G/17G
17 POWDER, FOR SOLUTION ORAL DAILY
Refills: 0 | Status: DISCONTINUED | OUTPATIENT
Start: 2021-05-10 | End: 2021-05-18

## 2021-05-10 RX ORDER — METOCLOPRAMIDE HCL 10 MG
10 TABLET ORAL EVERY 8 HOURS
Refills: 0 | Status: DISCONTINUED | OUTPATIENT
Start: 2021-05-10 | End: 2021-05-18

## 2021-05-10 RX ORDER — TRIAMTERENE/HYDROCHLOROTHIAZID 75 MG-50MG
1 TABLET ORAL DAILY
Refills: 0 | Status: DISCONTINUED | OUTPATIENT
Start: 2021-05-10 | End: 2021-05-18

## 2021-05-10 RX ORDER — GABAPENTIN 400 MG/1
100 CAPSULE ORAL THREE TIMES A DAY
Refills: 0 | Status: DISCONTINUED | OUTPATIENT
Start: 2021-05-10 | End: 2021-05-18

## 2021-05-10 RX ORDER — SODIUM CHLORIDE 9 MG/ML
1000 INJECTION, SOLUTION INTRAVENOUS
Refills: 0 | Status: DISCONTINUED | OUTPATIENT
Start: 2021-05-10 | End: 2021-05-13

## 2021-05-10 RX ORDER — BUPIVACAINE 13.3 MG/ML
20 INJECTION, SUSPENSION, LIPOSOMAL INFILTRATION ONCE
Refills: 0 | Status: DISCONTINUED | OUTPATIENT
Start: 2021-05-10 | End: 2021-05-18

## 2021-05-10 RX ORDER — ACETAMINOPHEN 500 MG
1000 TABLET ORAL ONCE
Refills: 0 | Status: COMPLETED | OUTPATIENT
Start: 2021-05-10 | End: 2021-05-10

## 2021-05-10 RX ORDER — OXYCODONE HYDROCHLORIDE 5 MG/1
5 TABLET ORAL EVERY 4 HOURS
Refills: 0 | Status: DISCONTINUED | OUTPATIENT
Start: 2021-05-10 | End: 2021-05-17

## 2021-05-10 RX ORDER — LABETALOL HCL 100 MG
10 TABLET ORAL ONCE
Refills: 0 | Status: COMPLETED | OUTPATIENT
Start: 2021-05-10 | End: 2021-05-10

## 2021-05-10 RX ORDER — FAMOTIDINE 10 MG/ML
20 INJECTION INTRAVENOUS EVERY 12 HOURS
Refills: 0 | Status: DISCONTINUED | OUTPATIENT
Start: 2021-05-10 | End: 2021-05-18

## 2021-05-10 RX ORDER — SENNA PLUS 8.6 MG/1
2 TABLET ORAL AT BEDTIME
Refills: 0 | Status: DISCONTINUED | OUTPATIENT
Start: 2021-05-10 | End: 2021-05-18

## 2021-05-10 RX ORDER — ACETAMINOPHEN 500 MG
1000 TABLET ORAL EVERY 8 HOURS
Refills: 0 | Status: DISCONTINUED | OUTPATIENT
Start: 2021-05-10 | End: 2021-05-18

## 2021-05-10 RX ORDER — DEXTROSE 50 % IN WATER 50 %
25 SYRINGE (ML) INTRAVENOUS ONCE
Refills: 0 | Status: DISCONTINUED | OUTPATIENT
Start: 2021-05-10 | End: 2021-05-13

## 2021-05-10 RX ORDER — INSULIN LISPRO 100/ML
VIAL (ML) SUBCUTANEOUS
Refills: 0 | Status: DISCONTINUED | OUTPATIENT
Start: 2021-05-10 | End: 2021-05-13

## 2021-05-10 RX ORDER — CEFAZOLIN SODIUM 1 G
2000 VIAL (EA) INJECTION EVERY 8 HOURS
Refills: 0 | Status: COMPLETED | OUTPATIENT
Start: 2021-05-11 | End: 2021-05-11

## 2021-05-10 RX ADMIN — CHLORHEXIDINE GLUCONATE 1 APPLICATION(S): 213 SOLUTION TOPICAL at 06:30

## 2021-05-10 RX ADMIN — HYDROMORPHONE HYDROCHLORIDE 2 MILLIGRAM(S): 2 INJECTION INTRAMUSCULAR; INTRAVENOUS; SUBCUTANEOUS at 00:29

## 2021-05-10 RX ADMIN — PANTOPRAZOLE SODIUM 40 MILLIGRAM(S): 20 TABLET, DELAYED RELEASE ORAL at 06:31

## 2021-05-10 RX ADMIN — GABAPENTIN 100 MILLIGRAM(S): 400 CAPSULE ORAL at 12:59

## 2021-05-10 RX ADMIN — SODIUM CHLORIDE 75 MILLILITER(S): 9 INJECTION, SOLUTION INTRAVENOUS at 00:33

## 2021-05-10 RX ADMIN — Medication 10 MILLIGRAM(S): at 20:09

## 2021-05-10 RX ADMIN — Medication 400 MILLIGRAM(S): at 21:36

## 2021-05-10 RX ADMIN — HYDROMORPHONE HYDROCHLORIDE 2 MILLIGRAM(S): 2 INJECTION INTRAMUSCULAR; INTRAVENOUS; SUBCUTANEOUS at 01:20

## 2021-05-10 RX ADMIN — ONDANSETRON 4 MILLIGRAM(S): 8 TABLET, FILM COATED ORAL at 06:31

## 2021-05-10 NOTE — BRIEF OPERATIVE NOTE - NSICDXBRIEFPREOP_GEN_ALL_CORE_FT
PRE-OP DIAGNOSIS:  Spondylolisthesis at L4-L5 level 06-May-2021 13:42:04  Alise Mejía  Lumbar stenosis 06-May-2021 13:42:12  Alise Mejía  
PRE-OP DIAGNOSIS:  Lumbar disc herniation 06-May-2021 12:00:41  Fernie Ordoñez  
PRE-OP DIAGNOSIS:  Spondylolisthesis at L4-L5 level 06-May-2021 13:42:04  Alise Mejía  Lumbar stenosis 06-May-2021 13:42:12  Alise Mejía

## 2021-05-10 NOTE — PROGRESS NOTE ADULT - SUBJECTIVE AND OBJECTIVE BOX
Ortho Note    Subjective:  Pt comfortable without complaints, pain controlled with current pain medication regimen. Patient NPO for OR today.  Denies CP, SOB, N/V, numbness/tingling, fevers or calf pain.        Vital Signs Last 24 Hrs  T(C): 36.1 (05-10-21 @ 06:20), Max: 36.1 (05-10-21 @ 06:20)  T(F): 97 (05-10-21 @ 06:20), Max: 97 (05-10-21 @ 06:20)  HR: 94 (05-10-21 @ 08:57) (92 - 94)  BP: 130/71 (05-10-21 @ 08:57) (130/71 - 143/76)  BP(mean): 95 (05-10-21 @ 08:57) (95 - 95)  RR: 18 (05-10-21 @ 08:57) (17 - 18)  SpO2: 93% (05-10-21 @ 08:57) (93% - 93%)  AVSS    Objective:    Physical Exam:  General: Pt Alert and oriented, NAD  Abdominal DSG C/D/I, abdomin soft, nontender to touch, patient reports passing flatus  Pulses: +2 pedal pulses bilaterally  Sensation: SILT bilateral lower extremities  Motor: EHL/FHL/TA/GS- 5/5, firing        Plan of Care:  A/P: 65F POD s/p ALIF L4-L5 on 5/6, transferred to SICU intubated post op due to intra op hypotension. CTA negative for PE. Has since been stepped down. OR for stage PSF L3-5 on Monday- 5/10  - Stable- afebrile, nontoxic apperance  - Pain Control- dilaudid 2-4mg PO Q4h prn moderate to severe pain, gabapentin 100mg PO TID  - DVT ppx: SCDS  - PT, WBS: WBAT  - bowel regimen, IS use  - NPO, LR @ 100ml.hour  - Dispo: OR today for PSF L3-L5    Ortho Pager [phone]

## 2021-05-10 NOTE — BRIEF OPERATIVE NOTE - OPERATION/FINDINGS
During L4/L5 discectomy, acute decrease in SBP from 100 to 50, decreased O2 and EtCO2 sat. Stabilized with pressors and fluids. L4/L5 ALIF completed. Hemostasis achieved, incision closed primarily. Taken from OR to CT for CTA PE then sicu
ALIF L4-L5. During discectomy, pt had acute SBP drop from 100 to 50 and drop in O2 sat and EtCO2, stabilized after pressors and fluids. Hemostasis achieved, incision closed primarily. Going for CTA PE protocol after OR, then SICU.
see operative report

## 2021-05-10 NOTE — BRIEF OPERATIVE NOTE - ASSISTANT(S)
PREVENTIVE HEALTH RECOMMENDATIONS:     Vaccines: Get a flu shot each year. Get a tetanus shot every 10 years.     Exercise for at least 150 minutes a week (an average of 30 minutes a day, 5 days of the week). This will help you control your weight and prevent disease.    Limit alcohol to one drink per day.    No smoking.     Wear sunscreen to prevent skin cancer.     See your dentist twice a year for an exam and cleaning.    Try to get Calcium 1200 mg total per day. It is best to not take it all at once. Try to get Vitamin D at least 1000 units per day.    BMI or Body Mass Index is a way of indicating weight and health risk for cardiovascular diseases, high blood pressure, diabetes.   Definitions:    Underweight is less than 18.5 and will be associated with health risk.   Normal BMI is 18.5 to 25   Overweight is 25-29   Obesity is 30 or greater   Morbid Obesity is 40 or greater or 35 or greater with diabetes or high blood pressure  Obesity and Morbid Obesity are associated with higher health risks. Lowering calories, exercising more may lower your BMI and even small decreases can have positive impact on lowering health risks.   Your Body mass index is 24.87 kg/(m^2)..     
Tiago
Kym Ordoñez
Kym Hooper

## 2021-05-10 NOTE — PRE-OP CHECKLIST - SELECT TESTS ORDERED
BMP/CBC/CMP/PT/PTT/INR/Urinalysis/EKG/CXR/COVID-19
BMP/CBC/Type and Cross/Type and Screen/EKG/COVID-19

## 2021-05-10 NOTE — BRIEF OPERATIVE NOTE - NSICDXBRIEFPOSTOP_GEN_ALL_CORE_FT
POST-OP DIAGNOSIS:  Lumbar stenosis 06-May-2021 13:42:23  Alise Mejía  Spondylolisthesis at L4-L5 level 06-May-2021 13:42:30  Alise Mejía  
POST-OP DIAGNOSIS:  Lumbar disc herniation 06-May-2021 12:02:17  Fernie Ordoñez  
POST-OP DIAGNOSIS:  Lumbar stenosis 06-May-2021 13:42:23  Alise Mejía  Spondylolisthesis at L4-L5 level 06-May-2021 13:42:30  Alise Mejía

## 2021-05-10 NOTE — BRIEF OPERATIVE NOTE - NSICDXBRIEFPROCEDURE_GEN_ALL_CORE_FT
PROCEDURES:  ALIF (anterior lumbar interbody fusion) 06-May-2021 12:02:36  Fernie Ordoñez  
PROCEDURES:  ALIF (anterior lumbar interbody fusion) 06-May-2021 12:02:36 L4/L5 Fernie Ordoñez  
PROCEDURES:  Laminectomy, decompressive, spine, lumbar, 1 level, with fusion using instrumentation 10-May-2021 20:19:41 L4/L5 Alise Mejía

## 2021-05-11 ENCOUNTER — TRANSCRIPTION ENCOUNTER (OUTPATIENT)
Age: 66
End: 2021-05-11

## 2021-05-11 LAB
ANION GAP SERPL CALC-SCNC: 9 MMOL/L — SIGNIFICANT CHANGE UP (ref 5–17)
BUN SERPL-MCNC: 12 MG/DL — SIGNIFICANT CHANGE UP (ref 7–23)
CALCIUM SERPL-MCNC: 8.8 MG/DL — SIGNIFICANT CHANGE UP (ref 8.4–10.5)
CHLORIDE SERPL-SCNC: 102 MMOL/L — SIGNIFICANT CHANGE UP (ref 96–108)
CO2 SERPL-SCNC: 30 MMOL/L — SIGNIFICANT CHANGE UP (ref 22–31)
CREAT SERPL-MCNC: 0.72 MG/DL — SIGNIFICANT CHANGE UP (ref 0.5–1.3)
GLUCOSE SERPL-MCNC: 128 MG/DL — HIGH (ref 70–99)
HCT VFR BLD CALC: 27.4 % — LOW (ref 34.5–45)
HGB BLD-MCNC: 8.7 G/DL — LOW (ref 11.5–15.5)
MAGNESIUM SERPL-MCNC: 2 MG/DL — SIGNIFICANT CHANGE UP (ref 1.6–2.6)
MCHC RBC-ENTMCNC: 30.5 PG — SIGNIFICANT CHANGE UP (ref 27–34)
MCHC RBC-ENTMCNC: 31.8 GM/DL — LOW (ref 32–36)
MCV RBC AUTO: 96.1 FL — SIGNIFICANT CHANGE UP (ref 80–100)
NRBC # BLD: 0 /100 WBCS — SIGNIFICANT CHANGE UP (ref 0–0)
PHOSPHATE SERPL-MCNC: 3.4 MG/DL — SIGNIFICANT CHANGE UP (ref 2.5–4.5)
PLATELET # BLD AUTO: 192 K/UL — SIGNIFICANT CHANGE UP (ref 150–400)
POTASSIUM SERPL-MCNC: 4.3 MMOL/L — SIGNIFICANT CHANGE UP (ref 3.5–5.3)
POTASSIUM SERPL-SCNC: 4.3 MMOL/L — SIGNIFICANT CHANGE UP (ref 3.5–5.3)
RBC # BLD: 2.85 M/UL — LOW (ref 3.8–5.2)
RBC # FLD: 14 % — SIGNIFICANT CHANGE UP (ref 10.3–14.5)
SODIUM SERPL-SCNC: 141 MMOL/L — SIGNIFICANT CHANGE UP (ref 135–145)
WBC # BLD: 11.08 K/UL — HIGH (ref 3.8–10.5)
WBC # FLD AUTO: 11.08 K/UL — HIGH (ref 3.8–10.5)

## 2021-05-11 RX ORDER — CEFAZOLIN SODIUM 1 G
1000 VIAL (EA) INJECTION EVERY 8 HOURS
Refills: 0 | Status: DISCONTINUED | OUTPATIENT
Start: 2021-05-11 | End: 2021-05-18

## 2021-05-11 RX ORDER — CEFAZOLIN SODIUM 1 G
1000 VIAL (EA) INJECTION EVERY 8 HOURS
Refills: 0 | Status: DISCONTINUED | OUTPATIENT
Start: 2021-05-11 | End: 2021-05-11

## 2021-05-11 RX ORDER — ENOXAPARIN SODIUM 100 MG/ML
40 INJECTION SUBCUTANEOUS EVERY 12 HOURS
Refills: 0 | Status: DISCONTINUED | OUTPATIENT
Start: 2021-05-12 | End: 2021-05-12

## 2021-05-11 RX ADMIN — ONDANSETRON 4 MILLIGRAM(S): 8 TABLET, FILM COATED ORAL at 00:17

## 2021-05-11 RX ADMIN — OXYCODONE HYDROCHLORIDE 5 MILLIGRAM(S): 5 TABLET ORAL at 00:16

## 2021-05-11 RX ADMIN — GABAPENTIN 100 MILLIGRAM(S): 400 CAPSULE ORAL at 00:16

## 2021-05-11 RX ADMIN — Medication 6 MILLIGRAM(S): at 00:20

## 2021-05-11 RX ADMIN — Medication 100 MILLIGRAM(S): at 21:27

## 2021-05-11 RX ADMIN — Medication 2000 MILLIGRAM(S): at 07:11

## 2021-05-11 RX ADMIN — Medication 1000 MILLIGRAM(S): at 05:46

## 2021-05-11 RX ADMIN — Medication 6 MILLIGRAM(S): at 05:46

## 2021-05-11 RX ADMIN — OXYCODONE HYDROCHLORIDE 5 MILLIGRAM(S): 5 TABLET ORAL at 12:23

## 2021-05-11 RX ADMIN — METHOCARBAMOL 500 MILLIGRAM(S): 500 TABLET, FILM COATED ORAL at 05:46

## 2021-05-11 RX ADMIN — GABAPENTIN 100 MILLIGRAM(S): 400 CAPSULE ORAL at 14:54

## 2021-05-11 RX ADMIN — SODIUM CHLORIDE 100 MILLILITER(S): 9 INJECTION, SOLUTION INTRAVENOUS at 07:16

## 2021-05-11 RX ADMIN — METHOCARBAMOL 500 MILLIGRAM(S): 500 TABLET, FILM COATED ORAL at 00:17

## 2021-05-11 RX ADMIN — Medication 1000 MILLIGRAM(S): at 06:25

## 2021-05-11 RX ADMIN — POLYETHYLENE GLYCOL 3350 17 GRAM(S): 17 POWDER, FOR SOLUTION ORAL at 11:40

## 2021-05-11 RX ADMIN — Medication 2000 MILLIGRAM(S): at 00:16

## 2021-05-11 RX ADMIN — Medication 6 MILLIGRAM(S): at 17:29

## 2021-05-11 RX ADMIN — GABAPENTIN 100 MILLIGRAM(S): 400 CAPSULE ORAL at 05:46

## 2021-05-11 RX ADMIN — OXYCODONE HYDROCHLORIDE 5 MILLIGRAM(S): 5 TABLET ORAL at 17:29

## 2021-05-11 RX ADMIN — Medication 1000 MILLIGRAM(S): at 21:26

## 2021-05-11 RX ADMIN — Medication 10 MILLIGRAM(S): at 10:04

## 2021-05-11 RX ADMIN — Medication 1000 MILLIGRAM(S): at 22:00

## 2021-05-11 RX ADMIN — OXYCODONE HYDROCHLORIDE 5 MILLIGRAM(S): 5 TABLET ORAL at 01:16

## 2021-05-11 RX ADMIN — OXYCODONE HYDROCHLORIDE 5 MILLIGRAM(S): 5 TABLET ORAL at 06:25

## 2021-05-11 RX ADMIN — SENNA PLUS 2 TABLET(S): 8.6 TABLET ORAL at 21:26

## 2021-05-11 RX ADMIN — Medication 1 TABLET(S): at 05:46

## 2021-05-11 RX ADMIN — SENNA PLUS 2 TABLET(S): 8.6 TABLET ORAL at 00:16

## 2021-05-11 RX ADMIN — METHOCARBAMOL 500 MILLIGRAM(S): 500 TABLET, FILM COATED ORAL at 21:27

## 2021-05-11 RX ADMIN — OXYCODONE HYDROCHLORIDE 5 MILLIGRAM(S): 5 TABLET ORAL at 11:39

## 2021-05-11 RX ADMIN — METHOCARBAMOL 500 MILLIGRAM(S): 500 TABLET, FILM COATED ORAL at 14:54

## 2021-05-11 RX ADMIN — OXYCODONE HYDROCHLORIDE 5 MILLIGRAM(S): 5 TABLET ORAL at 18:08

## 2021-05-11 RX ADMIN — ONDANSETRON 4 MILLIGRAM(S): 8 TABLET, FILM COATED ORAL at 17:28

## 2021-05-11 RX ADMIN — ONDANSETRON 4 MILLIGRAM(S): 8 TABLET, FILM COATED ORAL at 11:39

## 2021-05-11 RX ADMIN — Medication 1000 MILLIGRAM(S): at 15:34

## 2021-05-11 RX ADMIN — OXYCODONE HYDROCHLORIDE 5 MILLIGRAM(S): 5 TABLET ORAL at 05:46

## 2021-05-11 RX ADMIN — ONDANSETRON 4 MILLIGRAM(S): 8 TABLET, FILM COATED ORAL at 05:46

## 2021-05-11 RX ADMIN — Medication 6 MILLIGRAM(S): at 11:39

## 2021-05-11 RX ADMIN — GABAPENTIN 100 MILLIGRAM(S): 400 CAPSULE ORAL at 21:26

## 2021-05-11 RX ADMIN — Medication 1000 MILLIGRAM(S): at 14:54

## 2021-05-11 NOTE — DISCHARGE NOTE PROVIDER - NSDCMRMEDTOKEN_GEN_ALL_CORE_FT
gabapentin 100 mg oral tablet: 100 milligram(s) orally  triamterene-hydrochlorothiazide 37.5 mg- 25 mg oral capsule: 1 cap(s) orally once a day  Tylenol 8 HR Arthritis Pain 650 mg oral tablet, extended release: 2 tab(s) orally every 8 hours  vitamin c: 500 milligram(s) orally once a day   gabapentin 100 mg oral tablet: 100 milligram(s) orally  polyethylene glycol 3350 oral powder for reconstitution: 17 gram(s) orally once a day, As Needed for constipation  senna oral tablet: 2 tab(s) orally once a day (at bedtime), As Needed for constipation  triamterene-hydrochlorothiazide 37.5 mg- 25 mg oral capsule: 1 cap(s) orally once a day  vitamin c: 500 milligram(s) orally once a day   acetaminophen 500 mg oral tablet: 2 tab(s) orally every 8 hours MDD:6  gabapentin 100 mg oral capsule: 1 cap(s) orally 3 times a day MDD:3  methocarbamol 500 mg oral tablet: 1 tab(s) orally 3 times a day, As Needed muscle spasms MDD:3  oxyCODONE 5 mg oral tablet: 1 tab(s) orally every 4 hours, As needed, Severe Pain (7 - 10) MDD:6  polyethylene glycol 3350 oral powder for reconstitution: 17 gram(s) orally once a day, As Needed for constipation  senna oral tablet: 2 tab(s) orally once a day (at bedtime), As Needed for constipation  triamterene-hydrochlorothiazide 37.5 mg- 25 mg oral capsule: 1 cap(s) orally once a day  vitamin c: 500 milligram(s) orally once a day   acetaminophen 500 mg oral tablet: 2 tab(s) orally every 8 hours MDD:6  gabapentin 100 mg oral capsule: 1 cap(s) orally 3 times a day MDD:3  Keflex 500 mg oral capsule: 1 cap(s) orally 2 times a day MDD:2  methocarbamol 500 mg oral tablet: 1 tab(s) orally 3 times a day, As Needed muscle spasms MDD:3  oxyCODONE 5 mg oral tablet: 1 tab(s) orally every 4 hours, As needed, Severe Pain (7 - 10) MDD:6  polyethylene glycol 3350 oral powder for reconstitution: 17 gram(s) orally once a day, As Needed for constipation  senna oral tablet: 2 tab(s) orally once a day (at bedtime), As Needed for constipation  triamterene-hydrochlorothiazide 37.5 mg- 25 mg oral capsule: 1 cap(s) orally once a day  vitamin c: 500 milligram(s) orally once a day

## 2021-05-11 NOTE — DISCHARGE NOTE PROVIDER - NSDCCPCAREPLAN_GEN_ALL_CORE_FT
PRINCIPAL DISCHARGE DIAGNOSIS  Diagnosis: Spondylolisthesis  Assessment and Plan of Treatment: s/p Alif L4-L5       PRINCIPAL DISCHARGE DIAGNOSIS  Diagnosis: Spondylolisthesis  Assessment and Plan of Treatment: s/p Alif L4-L5      SECONDARY DISCHARGE DIAGNOSES  Diagnosis: Spondylolisthesis  Assessment and Plan of Treatment: s/p PSF L3-L5

## 2021-05-11 NOTE — DISCHARGE NOTE PROVIDER - NSDCCPTREATMENT_GEN_ALL_CORE_FT
PRINCIPAL PROCEDURE  Procedure: ALIF (anterior lumbar interbody fusion)  Findings and Treatment: spondylolisthesis       PRINCIPAL PROCEDURE  Procedure: ALIF (anterior lumbar interbody fusion)  Findings and Treatment: spondylolisthesis      SECONDARY PROCEDURE  Procedure: Laminectomy, decompressive, spine, lumbar, 1 level, with fusion using instrumentation  Findings and Treatment: spondylolisthesis

## 2021-05-11 NOTE — DISCHARGE NOTE PROVIDER - NSDCHHNEEDSERVICE_GEN_ALL_CORE
Medication teaching and assessment/Observation and assessment/Rehabilitation services/Teaching and training/Wound care and assessment/Other, specify...

## 2021-05-11 NOTE — DISCHARGE NOTE PROVIDER - NSDCFUSCHEDAPPT_GEN_ALL_CORE_FT
BEBETO VALERO ; 06/01/2021 ; [facility] [address] unspecified , primary osteoarthritis, unspecified shoulder   Medical clearance per Dr Masters, to be faxed .

## 2021-05-11 NOTE — DISCHARGE NOTE PROVIDER - PROVIDER TOKENS
PROVIDER:[TOKEN:[[id number]],FOLLOWUP:[2 weeks]] PROVIDER:[TOKEN:[[id number]],FOLLOWUP:[1 week]] PROVIDER:[TOKEN:[[id number]],FOLLOWUP:[1 week]],PROVIDER:[TOKEN:[[id number]],FOLLOWUP:[1 week]]

## 2021-05-11 NOTE — DISCHARGE NOTE PROVIDER - DETAILS OF MALNUTRITION DIAGNOSIS/DIAGNOSES
This patient has been assessed with a concern for Malnutrition and was treated during this hospitalization for the following Nutrition diagnosis/diagnoses:     -  05/07/2021: Morbid obesity (BMI > 40)

## 2021-05-11 NOTE — DISCHARGE NOTE PROVIDER - CARE PROVIDERS DIRECT ADDRESSES
[email or web address]

## 2021-05-11 NOTE — PROGRESS NOTE ADULT - SUBJECTIVE AND OBJECTIVE BOX
Orthopaedic Surgery Progress Note    Patient seen and examined. JAG overnight. Patient without complaints. Pain controlled. Denies CP, SOB, N/V, tactile fevers, calf pain.     Vital Signs Last 24 Hrs  T(C): 36.7 (11 May 2021 05:44), Max: 36.9 (10 May 2021 23:58)  T(F): 98.1 (11 May 2021 05:44), Max: 98.4 (10 May 2021 23:58)  HR: 91 (11 May 2021 05:44) (87 - 99)  BP: 111/60 (11 May 2021 05:44) (111/60 - 174/102)  BP(mean): 108 (10 May 2021 23:00) (95 - 133)  RR: 18 (11 May 2021 05:44) (13 - 20)  SpO2: 93% (11 May 2021 05:44) (93% - 98%)      Physical Exam:  Pt laying comfortably in bed, NAD.  Skin warm and well perfused, no visible erythema/ecchymoses.  Dressing C/D/I; abdomen soft and nontender  EHL/FHL/TA/GS 5/5 bilateral lower extremities, SLT in tact to distal bilateral lower extremities  Calves soft and nontender to palpation  DP pules palpable     Labs:                        9.9    12.45 )-----------( 187      ( 10 May 2021 20:26 )             30.6     05-10    140  |  102  |  9   ----------------------------<  110<H>  4.1   |  26  |  0.57    Ca    9.0      10 May 2021 20:26  Phos  3.2     05-10  Mg     1.9     05-10      A/P: 65F s/p ALIF L4-L5 on 5/6, PSF L3-5 5/10, stable postop  - Pain control   -PT/WBAT   -DVT prophylaxis: SCDs- start lovenox today?  - Monitor drain outputs  -Encourage incentive spirometry

## 2021-05-11 NOTE — DISCHARGE NOTE PROVIDER - CARE PROVIDER_API CALL
Jose Velazco)  Orthopedics  [address] [address]  Phone: [phone]  Fax: [phone]  Follow Up Time: 2 weeks   Jose Velazco)  Orthopedics  [address] [address]  Phone: [phone]  Fax: [phone]  Follow Up Time: 1 week   Jose Velazco)  Orthopedics  [address] [address]  Phone: [phone]  Fax: [phone]  Follow Up Time: 1 week    Yifan Hooper)  Plastic Surgery Surgery  [address] [facility], [address]  [place] [address]  Phone: [phone]  Fax: [phone]  Follow Up Time: 1 week

## 2021-05-11 NOTE — PROGRESS NOTE ADULT - ASSESSMENT
65yFemale s/p ALIF L4-5 on 5/6 with intraop complication of hypotension/hypoxia, now s/p posterior fusion doing well    - palpable distal pulses  - incision c/d/i  - care per ortho  - vascular will sign off  - please call back with questions or concerns

## 2021-05-11 NOTE — PROGRESS NOTE ADULT - SUBJECTIVE AND OBJECTIVE BOX
SUBJECTIVE: Patient seen and examined bedside by vascular team. had posterior fusion yesterday. fells better. no N/V, no abdominal pain. + flatus, no bm    triamterene 37.5 mG/hydrochlorothiazide 25 mG Tablet 1 Tablet(s) Oral daily      Vital Signs Last 24 Hrs  T(C): 36.7 (11 May 2021 05:44), Max: 36.9 (10 May 2021 23:58)  T(F): 98.1 (11 May 2021 05:44), Max: 98.4 (10 May 2021 23:58)  HR: 91 (11 May 2021 05:44) (87 - 99)  BP: 111/60 (11 May 2021 05:44) (111/60 - 174/102)  BP(mean): 108 (10 May 2021 23:00) (95 - 133)  RR: 18 (11 May 2021 05:44) (13 - 20)  SpO2: 93% (11 May 2021 05:44) (93% - 98%)  I&O's Detail    10 May 2021 07:01  -  11 May 2021 07:00  --------------------------------------------------------  IN:    Lactated Ringers: 1200 mL  Total IN: 1200 mL    OUT:    Accordian (mL): 145 mL    Bulb (mL): 40 mL    Estimated Blood Loss (mL): 400 mL    Indwelling Catheter - Urethral (mL): 2550 mL  Total OUT: 3135 mL    Total NET: -1935 mL    Physical Exam:  General: No acute distress, resting comfortably in bed  C/V: normal sinus rhythm  Pulm: Nonlabored breathing, no respiratory distress  Abd: soft, non-tender, non-distended, incisions clean/dry/intact.  Extrem: warm and well perfused, no edema, palpable distal pulses b/l    LABS:                        9.9    12.45 )-----------( 187      ( 10 May 2021 20:26 )             30.6     05-10    140  |  102  |  9   ----------------------------<  110<H>  4.1   |  26  |  0.57    Ca    9.0      10 May 2021 20:26  Phos  3.2     05-10  Mg     1.9     05-10            RADIOLOGY & ADDITIONAL STUDIES:

## 2021-05-11 NOTE — DISCHARGE NOTE PROVIDER - HOSPITAL COURSE
Admitted- 5-6-2021  Surgery  Rosanne-op Antibiotics  Pain control  DVT prophylaxis  OOB/Physical Therapy [facility]- 5-6-2021  Surgery- s/p ALIF L4-L5 on 5/6, PSF L3-L5 on 5/10  Rosanne-op Antibiotics  Pain control  DVT prophylaxis  OOB/Physical Therapy  Vascular Consult   Plastics Consult Admitted- 5-6-2021  Surgery- s/p ALIF L4-L5 on 5/6, PSF L3-L5 on 5/10  Rosanne-op Antibiotics IN, then PO  Pain control  DVT prophylaxis- Lovenox, SCDs  OOB/Physical Therapy  Vascular Consult   Plastics Consult  Medicine consult Admitted- 5-6-2021  Surgery- ALIF L4-L5 on 5/6  Transferred intubated to SICU for hypotension, hypoxia - extubated same day  CTA chest without evidence of PE  Surgery - PSF L3-L5 on 5/10  IV antibiotics, then PO  Pain control  DVT prophylaxis- Lovenox, SCDs  OOB/Physical Therapy  Vascular Consult   Plastics Consult  Medicine consult Admitted- 5-6-2021  Surgery- ALIF L4-L5 on 5/6  Transferred intubated to SICU for hypotension, hypoxia - extubated same day  CTA chest without evidence of PE  Surgery - PSF L3-L5 on 5/10  IV antibiotics, then PO  Pain control  DVT prophylaxis- Lovenox, SCDs  OOB/Physical Therapy  Vascular Consult   Plastics Consult  Medicine consult  Icu admission

## 2021-05-11 NOTE — DISCHARGE NOTE PROVIDER - NSDCFUADDINST_GEN_ALL_CORE_FT
Weight bear as tolerated with assistive device.  No strenuous activity, heavy lifting, driving or returning to work until cleared by MD.  You may shower - dressing is water-resistant, no soaking in bathtubs.  Remove dressing after post op day 5-7, then leave incision open to air. Keep incision clean and dry.  Try to have regular bowel movements, take stool softener or laxative if necessary.  Swelling may travel all the way down leg to foot, this is normal and will subside in a few weeks.  Call to schedule an appt with  _________ for follow up, if you have staples or sutures they will be removed in office.  Contact your doctor if you experience: fever greater than 101.5, chills, chest pain, difficulty breathing, redness or excessive drainage around the incision, other concerns.  Follow up with your primary care provider.   No strenuous activity (bending/twisting), heavy lifting, driving or returning to work until cleared by MD.  May shower. Let soapy water fall over incisions and pat dry. Do not need to cover. Dressing will be removed in the office.  Try to have regular bowel movements, take stool softener or laxative if necessary.  May apply ice to affected areas to decrease swelling.  Call to schedule an appt with Dr. Velazco for follow up.  Contact your doctor if you experience: fever greater than 101.5, chills, chest pain, difficulty breathing, redness or excessive drainage around the incision, other concerns.  Follow up with your primary care provider.    No strenuous activity (bending/twisting), heavy lifting, exercising,  driving or returning to work until cleared by MD.  May shower. Let soapy water fall over incisions and pat dry. Do not need to cover. Dressing will be removed in the office.  Try to have regular bowel movements, take stool softener or laxative if necessary.  May apply ice to affected areas to decrease swelling.  Call to schedule an appt with Dr. Velazco for follow up.  Contact your doctor if you experience: fever greater than 101.5, chills, chest pain, difficulty breathing, redness or excessive drainage around the incision, other concerns.  Follow up with your primary care provider.    No strenuous activity (bending/twisting), heavy lifting, exercising,  driving or returning to work until cleared by MD.    Dressing: Keep clean/dry     Try to have regular bowel movements, take stool softener or laxative if necessary.  May apply ice to affected areas to decrease swelling.  Call to schedule an appt with Dr. Velazco for follow up.  Contact your doctor if you experience: fever greater than 101.5, chills, chest pain, difficulty breathing, redness or excessive drainage around the incision, other concerns.  Follow up with your primary care provider.    No strenuous activity (bending/twisting), heavy lifting, driving or returning to work until cleared by MD.  You may shower. Let soapy water fall over incision and pat dry. Do not need to cover. Dressing will be removed in the office.  Try to have regular bowel movements, take stool softener or laxative if necessary.  May take pepcid or prilosec for upset stomach.  May apply ice to affected areas to decrease swelling.  Call to schedule an appt with Dr. Velaczo for follow up.  Contact your doctor if you experience: fever greater than 101.5, chills, chest pain, difficulty breathing, redness or excessive drainage around the incision, other concerns.  Follow up with your primary care provider.     Try to have regular bowel movements, take stool softener or laxative if necessary.  May apply ice to affected areas to decrease swelling.  Call to schedule an appt with Dr. Velazco for follow up.  Contact your doctor if you experience: fever greater than 101.5, chills, chest pain, difficulty breathing, redness or excessive drainage around the incision, other concerns.  Follow up with your primary care provider.    No strenuous activity (bending/twisting), heavy lifting, driving or returning to work until cleared by MD.  You may shower. Let soapy water fall over incision and pat dry. Do not need to cover. Dressing will be removed in the office.  Try to have regular bowel movements, take stool softener or laxative if necessary.  May take pepcid or prilosec for upset stomach.  May apply ice to affected areas to decrease swelling.  Call to schedule an appt with Dr. Velazco for follow up.  Contact your doctor if you experience: fever greater than 101.5, chills, chest pain, difficulty breathing, redness or excessive drainage around the incision, other concerns.  Follow up with your primary care provider.   Try to have regular bowel movements, take stool softener or laxative if necessary.  May apply ice to affected areas to decrease swelling.  Call to schedule an appt with Dr. Velazco for follow up.  Contact your doctor if you experience: fever greater than 101.5, chills, chest pain, difficulty breathing, redness or excessive drainage around the incision, other concerns.  Follow up with your primary care provider.    No strenuous activity (bending/twisting), heavy lifting, driving or returning to work until cleared by MD.  You may shower. Let soapy water fall over incision and pat dry. Do not need to cover. Dressing will be removed in the office.  Try to have regular bowel movements, take stool softener or laxative if necessary.  May take pepcid or prilosec for upset stomach.  May apply ice to affected areas to decrease swelling.  Call to schedule an appt with Dr. Velazco for follow up.  Contact your doctor if you experience: fever greater than 101.5, chills, chest pain, difficulty breathing, redness or excessive drainage around the incision, other concerns.  Follow up with your primary care provider.   Try to have regular bowel movements, take stool softener or laxative if necessary.  May apply ice to affected areas to decrease swelling.  Call to schedule an appt with Dr. Velazco for follow up.  Contact your doctor if you experience: fever greater than 101.5, chills, chest pain, difficulty breathing, redness or excessive drainage around the incision, other concerns.  Follow up with your primary care provider.   Please followup with Dr. Hooper with plastics outpatient regarding your incision.   You were admitted to the ICU after your ALIF spine surgery due to low blood pressure and C02 level during the operation. This issue has resolved and you were discharged from the ICU.    No strenuous activity (bending/twisting), heavy lifting, driving or returning to work until cleared by MD.  You may shower. Let soapy water fall over incision and pat dry. Do not need to cover. Dressing will be removed in the office.  Try to have regular bowel movements, take stool softener or laxative if necessary.  May take pepcid or prilosec for upset stomach.  May apply ice to affected areas to decrease swelling.  Call to schedule an appt with Dr. Velazco for follow up.  Contact your doctor if you experience: fever greater than 101.5, chills, chest pain, difficulty breathing, redness or excessive drainage around the incision, other concerns.  Follow up with your primary care provider.   Try to have regular bowel movements, take stool softener or laxative if necessary.  May apply ice to affected areas to decrease swelling.  Call to schedule an appt with Dr. Velazco for follow up.  Contact your doctor if you experience: fever greater than 101.5, chills, chest pain, difficulty breathing, redness or excessive drainage around the incision, other concerns.  Follow up with your primary care provider.   Please followup with Dr. Hooper with plastics outpatient regarding your incision.   You were admitted to the ICU after your ALIF spine surgery due to low blood pressure and C02 level during the operation. This issue has resolved and you were discharged from the ICU.   Take the entire duration of the antibiotic.

## 2021-05-11 NOTE — PROGRESS NOTE ADULT - SUBJECTIVE AND OBJECTIVE BOX
Ortho Note      Subjective:  Pt comfortable without complaints, pain controlled with current pain medication regimen. Patient with x2 hemovac drains.   Denies CP, SOB, N/V, numbness/tingling     Vital Signs Last 24 Hrs  T(C): --  T(F): --  HR: --  BP: --  BP(mean): --  RR: --  SpO2: --  AVSS    Objective:    Physical Exam:  General: Pt Alert and oriented, NAD  Abdominal DSG C/D/I, abdomin soft, nontender to touch, patient reports passing flatus  Dressing- lumbar dressing, c,d,1 x2 hv   Pulses: +2 pedal pulses bilaterally  Sensation: SILT bilateral lower extremities  Motor: EHL/FHL/TA/GS- 5/5, firing        Plan of Care:  A/P: 65yFemale s/p ALIF L4-L5 on 5/6, PSF L3-5 5/10  - afebrile, nontoxic apperance  - Pain Control-   - DVT ppx: SCDS  - PT, WBS: WBAT  - monitor drain output    Ortho Pager [phone] Ortho Note      Subjective:  Pt comfortable without complaints, pain controlled with current pain medication regimen. Patient with x2 hemovac drains.   Denies CP, SOB, N/V, numbness/tingling     Vital Signs Last 24 Hrs  T(C): --  T(F): --  HR: --  BP: --  BP(mean): --  RR: --  SpO2: --  AVSS    Objective:    Physical Exam:  General: Pt Alert and oriented, NAD  Abdominal DSG C/D/I, abdomin soft, nontender to touch, patient reports passing flatus  Dressing- lumbar dressing, c,d,1 x2 hv   Pulses: +2 pedal pulses bilaterally  Sensation: SILT bilateral lower extremities  Motor: EHL/FHL/TA/GS- 5/5, firing        Plan of Care:  A/P: 65yFemale s/p ALIF L4-L5 on 5/6, PSF L3-5 5/10  - afebrile, nontoxic apperance  - Start Ancef 1gram Q8h  - Pain Control- methocarbamol 500mg Po TID, tylenol 1000mg Po Q8h, gabapentin 100mg PO TID, Oxycodone 5mg PO Q4h prn severe pain ,   - DVT ppx: SCDS, start lovenox tomorrow  - PT, WBS: WBAT  - monitor drain output  - Dispo- pending pt clearance and drain d/c    Ortho Pager [phone]

## 2021-05-12 RX ORDER — ENOXAPARIN SODIUM 100 MG/ML
40 INJECTION SUBCUTANEOUS DAILY
Refills: 0 | Status: DISCONTINUED | OUTPATIENT
Start: 2021-05-12 | End: 2021-05-18

## 2021-05-12 RX ORDER — MAGNESIUM HYDROXIDE 400 MG/1
30 TABLET, CHEWABLE ORAL DAILY
Refills: 0 | Status: DISCONTINUED | OUTPATIENT
Start: 2021-05-12 | End: 2021-05-18

## 2021-05-12 RX ADMIN — POLYETHYLENE GLYCOL 3350 17 GRAM(S): 17 POWDER, FOR SOLUTION ORAL at 13:09

## 2021-05-12 RX ADMIN — Medication 5 MILLIGRAM(S): at 09:45

## 2021-05-12 RX ADMIN — GABAPENTIN 100 MILLIGRAM(S): 400 CAPSULE ORAL at 06:31

## 2021-05-12 RX ADMIN — METHOCARBAMOL 500 MILLIGRAM(S): 500 TABLET, FILM COATED ORAL at 06:31

## 2021-05-12 RX ADMIN — OXYCODONE HYDROCHLORIDE 5 MILLIGRAM(S): 5 TABLET ORAL at 21:51

## 2021-05-12 RX ADMIN — Medication 6 MILLIGRAM(S): at 00:39

## 2021-05-12 RX ADMIN — ONDANSETRON 4 MILLIGRAM(S): 8 TABLET, FILM COATED ORAL at 13:09

## 2021-05-12 RX ADMIN — Medication 1000 MILLIGRAM(S): at 13:50

## 2021-05-12 RX ADMIN — Medication 100 MILLIGRAM(S): at 21:56

## 2021-05-12 RX ADMIN — Medication 1000 MILLIGRAM(S): at 06:31

## 2021-05-12 RX ADMIN — ONDANSETRON 4 MILLIGRAM(S): 8 TABLET, FILM COATED ORAL at 00:39

## 2021-05-12 RX ADMIN — ONDANSETRON 4 MILLIGRAM(S): 8 TABLET, FILM COATED ORAL at 06:31

## 2021-05-12 RX ADMIN — Medication 1000 MILLIGRAM(S): at 21:56

## 2021-05-12 RX ADMIN — Medication 1 TABLET(S): at 06:32

## 2021-05-12 RX ADMIN — ENOXAPARIN SODIUM 40 MILLIGRAM(S): 100 INJECTION SUBCUTANEOUS at 06:32

## 2021-05-12 RX ADMIN — GABAPENTIN 100 MILLIGRAM(S): 400 CAPSULE ORAL at 13:50

## 2021-05-12 RX ADMIN — ONDANSETRON 4 MILLIGRAM(S): 8 TABLET, FILM COATED ORAL at 18:04

## 2021-05-12 RX ADMIN — Medication 100 MILLIGRAM(S): at 13:50

## 2021-05-12 RX ADMIN — OXYCODONE HYDROCHLORIDE 5 MILLIGRAM(S): 5 TABLET ORAL at 20:51

## 2021-05-12 RX ADMIN — Medication 100 MILLIGRAM(S): at 06:33

## 2021-05-12 RX ADMIN — SENNA PLUS 2 TABLET(S): 8.6 TABLET ORAL at 21:56

## 2021-05-12 RX ADMIN — Medication 1000 MILLIGRAM(S): at 07:15

## 2021-05-12 RX ADMIN — Medication 1000 MILLIGRAM(S): at 14:50

## 2021-05-12 RX ADMIN — Medication 1000 MILLIGRAM(S): at 22:56

## 2021-05-12 RX ADMIN — MAGNESIUM HYDROXIDE 30 MILLILITER(S): 400 TABLET, CHEWABLE ORAL at 06:33

## 2021-05-12 RX ADMIN — METHOCARBAMOL 500 MILLIGRAM(S): 500 TABLET, FILM COATED ORAL at 13:50

## 2021-05-12 RX ADMIN — METHOCARBAMOL 500 MILLIGRAM(S): 500 TABLET, FILM COATED ORAL at 21:56

## 2021-05-12 RX ADMIN — GABAPENTIN 100 MILLIGRAM(S): 400 CAPSULE ORAL at 21:56

## 2021-05-12 NOTE — CHART NOTE - NSCHARTNOTEFT_GEN_A_CORE
Admitting Diagnosis:   Patient is a 65y old  Female who presents with a chief complaint of low back pain (12 May 2021 11:07)      PAST MEDICAL & SURGICAL HISTORY:  Asthma  childhood    Hypertension    Obesity    Osteoarthritis of hips, bilateral    HLD (hyperlipidemia)    H/O breast surgery  bilateral lift    History of hip replacement, total, right  2016 right , left 2018        Current Nutrition Order:   Diet, Regular:   DASH/TLC {Sodium & Cholesterol Restricted} (DASH) (05-12-21 @ 07:11)    PO Intake: Good (%) [  X ]  Fair (50-75%) [   ] Poor (<25%) [   ]    GI Issues:   Pt denies BM as of yet, per flow sheets BM 5/5   >> Milk of magnesium and senna ordered       Pain:  none per flow sheets     Skin Integrity:  Andres 20  1+ generalized edema   No pressure ulcers; Sx site noted     Labs:   05-11    141  |  102  |  12  ----------------------------<  128<H>  4.3   |  30  |  0.72    Ca    8.8      11 May 2021 07:58  Phos  3.4     05-11  Mg     2.0     05-11      CAPILLARY BLOOD GLUCOSE      POCT Blood Glucose.: 133 mg/dL (12 May 2021 13:00)  POCT Blood Glucose.: 127 mg/dL (12 May 2021 07:29)  POCT Blood Glucose.: 143 mg/dL (11 May 2021 22:02)  POCT Blood Glucose.: 139 mg/dL (11 May 2021 17:49)      Medications:  MEDICATIONS  (STANDING):  acetaminophen   Tablet .. 1000 milliGRAM(s) Oral every 8 hours  BUpivacaine liposome 1.3% Injectable (no eMAR) 20 milliLiter(s) Local Injection once  ceFAZolin   IVPB 1000 milliGRAM(s) IV Intermittent every 8 hours  dexAMETHasone  Injectable 6 milliGRAM(s) IV Push every 6 hours  dextrose 40% Gel 15 Gram(s) Oral once  dextrose 5%. 1000 milliLiter(s) (50 mL/Hr) IV Continuous <Continuous>  dextrose 5%. 1000 milliLiter(s) (100 mL/Hr) IV Continuous <Continuous>  dextrose 50% Injectable 25 Gram(s) IV Push once  dextrose 50% Injectable 12.5 Gram(s) IV Push once  dextrose 50% Injectable 25 Gram(s) IV Push once  enoxaparin Injectable 40 milliGRAM(s) SubCutaneous daily  gabapentin 100 milliGRAM(s) Oral three times a day  glucagon  Injectable 1 milliGRAM(s) IntraMuscular once  glucagon  Injectable 1 milliGRAM(s) IntraMuscular once  insulin lispro (ADMELOG) corrective regimen sliding scale   SubCutaneous Before meals and at bedtime  methocarbamol 500 milliGRAM(s) Oral three times a day  ondansetron   Disintegrating Tablet 4 milliGRAM(s) Oral every 6 hours  polyethylene glycol 3350 17 Gram(s) Oral daily  povidone iodine 5% Nasal Swab 1 Application(s) Both Nostrils once  senna 2 Tablet(s) Oral at bedtime  Streptomycin 1000 milliGRAM(s),NaCl 0.9% 1000 milliLiter(s) 1000 milliGRAM(s) IV Intermittent every 10 minutes  sugammadex Injectable 500 milliGRAM(s) IV Push once  triamterene 37.5 mG/hydrochlorothiazide 25 mG Tablet 1 Tablet(s) Oral daily    MEDICATIONS  (PRN):  famotidine    Tablet 20 milliGRAM(s) Oral every 12 hours PRN Dyspepsia  HYDROmorphone  Injectable 0.5 milliGRAM(s) IV Push every 15 minutes PRN Severe Pain (7 - 10)  magnesium hydroxide Suspension 30 milliLiter(s) Oral daily PRN Constipation  metoclopramide Injectable 10 milliGRAM(s) IV Push every 8 hours PRN Nausea and/or Vomiting  oxyCODONE    IR 5 milliGRAM(s) Oral every 4 hours PRN Severe Pain (7 - 10)      5'5''  pounds +-10%  Weight 241 pounds  BMI 40.2  %DZK=515    Weight Change:  5/6 252.6 pounds  Admit wt 241 pounds, Pt Reports gaining wt during covid pandemic, exact amount gained not stated. Varying EMR wts during admission noted, pt on diffrent units vs ? accuracy of bedscale    Estimated energy needs:   IBW used to calculate energy needs due to pt's current body weight exceeding 120% of IBW  EER adjusted for age BMI post op     Estimated Energy Needs:  · Weight (lbs)	125 lb	     · Weight (kg)	56.6 kg	     · Enter From (moni/kg)	25	     · Enter To (moni/kg)	30	     · Calculated From (moni/kg)	1415	     · Calculated To (moni/kg)	1698	      Estimated Protein Needs:  · Weight (lbs)	125 lb	     · Weight (kg)	56.6 kg	     · Enter From (g/kg)	1.2	     · Enter To (g/kg)	1.4	     · Calculated From (g/kg)	67.92	     · Calculated To (g/kg)	79.24	      Estimated Fluid Needs:  · Weight (lbs)	125 lb	     · Weight (kg)	56.6 kg	     · Enter From (ml/kg)	25	     · Enter To (ml/kg)	30	     · Calculated From (ml/kg)	1415	     · Calculated To (ml/kg)	1698	       Subjective: 65yoF HTN HLD BL hip Osteoarthritis, with c/o low back pain x >1 year 2/2 lumbar spondylolisthesis. S/p ALIF 5/6. Pt Taken from OR to CT for CTA PE then SCU d/t acute decrease in SBP. CT showing mild atelectasis bilaterally without evidence of PE. Pt extubated 5/6. Now s/p Laminectomy, decompressive, spine, lumbar, 1 level, with fusion using instrumentation 5/10.    Now on 9WO, with D/C pending pt clearance and drain d/c. Pt ordered for DASH tlc diet. Reports eating well at breakfast which was her first solid meal. No reports of issues with meal today.   Please see below for RD Recs.     Prior Pes: inadequate energy intake Signs/Symptoms RT intake<EER AEB Clear Liquids.  NA, pt on PO diet  New PES: Overweight/obesity RT presumed intake>EER AEB BMI 40.2     Recommendations:  1. Cont with DASH TLC.  >> add Consistent Carbohydrate PRN Pending BG/POCT with steroid use.   2. Monitor %PO intake, diet gonzalez.  3. Monitor Skin, wts.  4. Pain/GI per team.  5. Labs: monitor BMP, CBC, glucose, lytes, trend renal indices, POCT.  6. RD to remain available for additional nutrition interventions as needed.   Education: Tips for GI distress provided. Wt loss tips provided per pt request. Reviewed general healthful diet education. Emailed education per pt request.     Risk Level: High [   ] Moderate [  X ] Low [   ]

## 2021-05-12 NOTE — PROGRESS NOTE ADULT - SUBJECTIVE AND OBJECTIVE BOX
Orthopaedic Surgery Progress Note    Patient seen and examined. JAG overnight. Patient without complaints. Pain controlled. Did well with PT yesterday. Denies CP, SOB, N/V, tactile fevers, calf pain.     Vital Signs Last 24 Hrs  T(C): 36.2 (12 May 2021 00:36), Max: 36.9 (11 May 2021 09:09)  T(F): 97.2 (12 May 2021 00:36), Max: 98.4 (11 May 2021 09:09)  HR: 94 (12 May 2021 00:36) (85 - 94)  BP: 116/62 (12 May 2021 00:36) (111/58 - 122/69)  BP(mean): --  RR: 16 (12 May 2021 00:36) (16 - 17)  SpO2: 100% (12 May 2021 00:36) (95% - 100%)      Physical Exam:  Pt laying comfortably in bed, NAD.  Skin warm and well perfused, no visible erythema/ecchymoses.  Dressing C/D/I; abdomen soft and nontender. HVx1, JPx1  EHL/FHL/TA/GS 5/5 bilateral lower extremities, SLT in tact to distal bilateral lower extremities  Calves soft and nontender to palpation  DP pules palpable     Labs:                        8.7    11.08 )-----------( 192      ( 11 May 2021 07:58 )             27.4     05-11    141  |  102  |  12  ----------------------------<  128<H>  4.3   |  30  |  0.72    Ca    8.8      11 May 2021 07:58  Phos  3.4     05-11  Mg     2.0     05-11                A/P: 65F s/p ALIF L4-L5 on 5/6, PSF L3-5 5/10, stable postop  - Pain control   -PT/WBAT   -DVT prophylaxis: SCDs, lovenox  - Monitor drain outputs- dc HV when <80/24hr, NIC when <20/24hr  -Encourage incentive spirometry  - Dispo: HPT

## 2021-05-12 NOTE — PROGRESS NOTE ADULT - SUBJECTIVE AND OBJECTIVE BOX
Ortho Note    Pt comfortable without complaints, pain controlled  Denies CP, SOB, N/V, numbness/tingling     Vital Signs Last 24 Hrs  T(C): 36.4 (05-12-21 @ 09:16), Max: 36.4 (05-12-21 @ 09:16)  T(F): 97.6 (05-12-21 @ 09:16), Max: 97.6 (05-12-21 @ 09:16)  HR: 99 (05-12-21 @ 09:16) (99 - 99)  BP: 129/69 (05-12-21 @ 09:16) (129/69 - 129/69)  BP(mean): --  RR: 17 (05-12-21 @ 09:16) (17 - 17)  SpO2: 94% (05-12-21 @ 09:16) (94% - 94%)  I&O's Summary    11 May 2021 07:01  -  12 May 2021 07:00  --------------------------------------------------------  IN: 1530 mL / OUT: 2670 mL / NET: -1140 mL    12 May 2021 07:01  -  12 May 2021 13:54  --------------------------------------------------------  IN: 350 mL / OUT: 0 mL / NET: 350 mL        General: Pt Alert and oriented, NAD  DSG C/D/I abdomen/back, drain x 1 and NIC x 1 in place posteriorly  Pulses intact b/l LE  Sensation: intact b/l LE  Motor: EHL/FHL/TA/GS 5/5 b/l                          8.7    11.08 )-----------( 192      ( 11 May 2021 07:58 )             27.4     05-11    141  |  102  |  12  ----------------------------<  128<H>  4.3   |  30  |  0.72    Ca    8.8      11 May 2021 07:58  Phos  3.4     05-11  Mg     2.0     05-11        A/P: 65yFemale POD#6 s/p ALIF L4-5, POD#2 s/p PSF L3-5  - Stable  - Bowel regimen  - continue ancef  - Pain Control  - DVT ppx: scds, lovenox 40mg qd  - PT, WBS: wbat    Ortho Pager [phone]

## 2021-05-13 LAB
ANION GAP SERPL CALC-SCNC: 10 MMOL/L — SIGNIFICANT CHANGE UP (ref 5–17)
BUN SERPL-MCNC: 17 MG/DL — SIGNIFICANT CHANGE UP (ref 7–23)
CALCIUM SERPL-MCNC: 8.8 MG/DL — SIGNIFICANT CHANGE UP (ref 8.4–10.5)
CHLORIDE SERPL-SCNC: 103 MMOL/L — SIGNIFICANT CHANGE UP (ref 96–108)
CO2 SERPL-SCNC: 28 MMOL/L — SIGNIFICANT CHANGE UP (ref 22–31)
CREAT SERPL-MCNC: 0.8 MG/DL — SIGNIFICANT CHANGE UP (ref 0.5–1.3)
GLUCOSE SERPL-MCNC: 87 MG/DL — SIGNIFICANT CHANGE UP (ref 70–99)
HCT VFR BLD CALC: 27.4 % — LOW (ref 34.5–45)
HGB BLD-MCNC: 8.7 G/DL — LOW (ref 11.5–15.5)
MCHC RBC-ENTMCNC: 30.9 PG — SIGNIFICANT CHANGE UP (ref 27–34)
MCHC RBC-ENTMCNC: 31.8 GM/DL — LOW (ref 32–36)
MCV RBC AUTO: 97.2 FL — SIGNIFICANT CHANGE UP (ref 80–100)
NRBC # BLD: 0 /100 WBCS — SIGNIFICANT CHANGE UP (ref 0–0)
PLATELET # BLD AUTO: 217 K/UL — SIGNIFICANT CHANGE UP (ref 150–400)
POTASSIUM SERPL-MCNC: 3.8 MMOL/L — SIGNIFICANT CHANGE UP (ref 3.5–5.3)
POTASSIUM SERPL-SCNC: 3.8 MMOL/L — SIGNIFICANT CHANGE UP (ref 3.5–5.3)
RBC # BLD: 2.82 M/UL — LOW (ref 3.8–5.2)
RBC # FLD: 14.4 % — SIGNIFICANT CHANGE UP (ref 10.3–14.5)
SARS-COV-2 RNA SPEC QL NAA+PROBE: SIGNIFICANT CHANGE UP
SODIUM SERPL-SCNC: 141 MMOL/L — SIGNIFICANT CHANGE UP (ref 135–145)
WBC # BLD: 12.58 K/UL — HIGH (ref 3.8–10.5)
WBC # FLD AUTO: 12.58 K/UL — HIGH (ref 3.8–10.5)

## 2021-05-13 PROCEDURE — 72100 X-RAY EXAM L-S SPINE 2/3 VWS: CPT | Mod: 26

## 2021-05-13 RX ORDER — HYDROMORPHONE HYDROCHLORIDE 2 MG/ML
0.5 INJECTION INTRAMUSCULAR; INTRAVENOUS; SUBCUTANEOUS EVERY 4 HOURS
Refills: 0 | Status: DISCONTINUED | OUTPATIENT
Start: 2021-05-13 | End: 2021-05-18

## 2021-05-13 RX ADMIN — GABAPENTIN 100 MILLIGRAM(S): 400 CAPSULE ORAL at 21:15

## 2021-05-13 RX ADMIN — HYDROMORPHONE HYDROCHLORIDE 0.5 MILLIGRAM(S): 2 INJECTION INTRAMUSCULAR; INTRAVENOUS; SUBCUTANEOUS at 23:23

## 2021-05-13 RX ADMIN — GABAPENTIN 100 MILLIGRAM(S): 400 CAPSULE ORAL at 13:24

## 2021-05-13 RX ADMIN — OXYCODONE HYDROCHLORIDE 5 MILLIGRAM(S): 5 TABLET ORAL at 10:45

## 2021-05-13 RX ADMIN — METHOCARBAMOL 500 MILLIGRAM(S): 500 TABLET, FILM COATED ORAL at 13:24

## 2021-05-13 RX ADMIN — OXYCODONE HYDROCHLORIDE 5 MILLIGRAM(S): 5 TABLET ORAL at 21:14

## 2021-05-13 RX ADMIN — Medication 100 MILLIGRAM(S): at 13:24

## 2021-05-13 RX ADMIN — Medication 1 TABLET(S): at 05:25

## 2021-05-13 RX ADMIN — METHOCARBAMOL 500 MILLIGRAM(S): 500 TABLET, FILM COATED ORAL at 05:25

## 2021-05-13 RX ADMIN — Medication 1000 MILLIGRAM(S): at 06:25

## 2021-05-13 RX ADMIN — GABAPENTIN 100 MILLIGRAM(S): 400 CAPSULE ORAL at 05:25

## 2021-05-13 RX ADMIN — Medication 1000 MILLIGRAM(S): at 05:25

## 2021-05-13 RX ADMIN — OXYCODONE HYDROCHLORIDE 5 MILLIGRAM(S): 5 TABLET ORAL at 22:28

## 2021-05-13 RX ADMIN — HYDROMORPHONE HYDROCHLORIDE 0.5 MILLIGRAM(S): 2 INJECTION INTRAMUSCULAR; INTRAVENOUS; SUBCUTANEOUS at 23:33

## 2021-05-13 RX ADMIN — ENOXAPARIN SODIUM 40 MILLIGRAM(S): 100 INJECTION SUBCUTANEOUS at 12:01

## 2021-05-13 RX ADMIN — Medication 1000 MILLIGRAM(S): at 22:14

## 2021-05-13 RX ADMIN — Medication 1000 MILLIGRAM(S): at 13:24

## 2021-05-13 RX ADMIN — Medication 1000 MILLIGRAM(S): at 14:22

## 2021-05-13 RX ADMIN — Medication 100 MILLIGRAM(S): at 21:14

## 2021-05-13 RX ADMIN — Medication 100 MILLIGRAM(S): at 05:24

## 2021-05-13 RX ADMIN — POLYETHYLENE GLYCOL 3350 17 GRAM(S): 17 POWDER, FOR SOLUTION ORAL at 12:00

## 2021-05-13 RX ADMIN — Medication 1000 MILLIGRAM(S): at 21:14

## 2021-05-13 RX ADMIN — METHOCARBAMOL 500 MILLIGRAM(S): 500 TABLET, FILM COATED ORAL at 22:31

## 2021-05-13 RX ADMIN — MAGNESIUM HYDROXIDE 30 MILLILITER(S): 400 TABLET, CHEWABLE ORAL at 12:05

## 2021-05-13 RX ADMIN — SENNA PLUS 2 TABLET(S): 8.6 TABLET ORAL at 21:15

## 2021-05-13 RX ADMIN — OXYCODONE HYDROCHLORIDE 5 MILLIGRAM(S): 5 TABLET ORAL at 09:44

## 2021-05-13 NOTE — PROGRESS NOTE ADULT - SUBJECTIVE AND OBJECTIVE BOX
Orthopaedic Surgery Progress Note    Patient seen and examined. JAG overnight. Patient without complaints, states overall is not having much pain. Doing well with PT. Denies CP, SOB, N/V, tactile fevers, calf pain.     Vital Signs Last 24 Hrs  T(C): 36.6 (13 May 2021 05:27), Max: 37.1 (12 May 2021 17:55)  T(F): 97.8 (13 May 2021 05:27), Max: 98.8 (12 May 2021 17:55)  HR: 91 (13 May 2021 05:27) (91 - 107)  BP: 114/68 (13 May 2021 05:27) (110/70 - 129/69)  BP(mean): --  RR: 18 (13 May 2021 05:27) (17 - 20)  SpO2: 98% (13 May 2021 05:27) (94% - 98%)    Physical Exam:  Pt laying comfortably in bed, NAD.  Skin warm and well perfused, no visible erythema/ecchymoses.  Dressing C/D/I; abdomen soft and nontender. HVx1, JPx1  EHL/FHL/TA/GS 5/5 bilateral lower extremities, SLT in tact to distal bilateral lower extremities  Calves soft and nontender to palpation  DP pules palpable     Labs:                          8.7    11.08 )-----------( 192      ( 11 May 2021 07:58 )             27.4     05-11    141  |  102  |  12  ----------------------------<  128<H>  4.3   |  30  |  0.72    Ca    8.8      11 May 2021 07:58  Phos  3.4     05-11  Mg     2.0     05-11                A/P: 65F s/p ALIF L4-L5 on 5/6, PSF L3-5 5/10, stable postop  - Pain control   -PT/WBAT   -DVT prophylaxis: SCDs, lovenox  - Monitor drain outputs- dc HV when <80/24hr, NIC when <20/24hr  -Encourage incentive spirometry  - Dispo: HPT

## 2021-05-13 NOTE — PROGRESS NOTE ADULT - SUBJECTIVE AND OBJECTIVE BOX
Ortho Note    Subjective:  Pt comfortable without complaints, pain controlled with current pain medication regimen.  1 hv, x1 nita drain   Denies CP, SOB, N/V, numbness/tingling       Vital Signs Last 24 Hrs  T(C): 36.7 (05-13-21 @ 08:59), Max: 36.7 (05-13-21 @ 08:59)  T(F): 98 (05-13-21 @ 08:59), Max: 98 (05-13-21 @ 08:59)  HR: 92 (05-13-21 @ 08:59) (91 - 92)  BP: 101/59 (05-13-21 @ 08:59) (101/59 - 114/68)  BP(mean): --  RR: 17 (05-13-21 @ 08:59) (17 - 18)  SpO2: 95% (05-13-21 @ 08:59) (95% - 98%)  AVSS      Objective:    Physical Exam:  General: Pt Alert and oriented, NAD  Abdominal DSG C/D/I, abdomin soft, nontender to touch,   Dressing- lumbar dressing, c,d,1 x2 hv   Pulses: +2 pedal pulses bilaterally  Sensation: SILT bilateral lower extremities  Motor: EHL/FHL/TA/GS- 5/5, firing          Plan of Care:  A/P: 65yFemale s/p ALIF L4-L5 on 5/6, PSF L3-5 5/10  - afebrile, nontoxic appearance  - Continue Ancef 100mg Q8h IV, start Keflex 500mg PO   - Pain Control- methocarbamol 500mg Po TID, tylenol 1000mg Po Q8h, gabapentin 100mg PO TID, Oxycodone 5mg PO Q4h prn severe pain ,   - DVT ppx: SCDS, enoxaparin 40mg subq daily  - PT, WBS: WBAT  - monitor drain output  - Dispo- pending pt clearance and drain d/c    Ortho Pager [phone] Ortho Note    Subjective:  Pt comfortable without complaints, pain controlled with current pain medication regimen.  1 hv, x1 nita drain   Denies CP, SOB, N/V, numbness/tingling       Vital Signs Last 24 Hrs  T(C): 36.7 (05-13-21 @ 08:59), Max: 36.7 (05-13-21 @ 08:59)  T(F): 98 (05-13-21 @ 08:59), Max: 98 (05-13-21 @ 08:59)  HR: 92 (05-13-21 @ 08:59) (91 - 92)  BP: 101/59 (05-13-21 @ 08:59) (101/59 - 114/68)  BP(mean): --  RR: 17 (05-13-21 @ 08:59) (17 - 18)  SpO2: 95% (05-13-21 @ 08:59) (95% - 98%)  AVSS      Objective:    Physical Exam:  General: Pt Alert and oriented, NAD  Abdominal DSG C/D/I, abdomin soft, nontender to touch,   Dressing- lumbar dressing, c,d,1 x2 hv   Pulses: +2 pedal pulses bilaterally  Sensation: SILT bilateral lower extremities  Motor: EHL/FHL/TA/GS- 5/5, firing          Plan of Care:  A/P: 65yFemale s/p ALIF L4-L5 on 5/6, PSF L3-5 5/10  - afebrile, nontoxic appearance  - Continue Ancef 100mg Q8h IV, start Keflex 500mg PO Q8h when she is discharge x1week  - Pain Control- methocarbamol 500mg Po TID, tylenol 1000mg Po Q8h, gabapentin 100mg PO TID, Oxycodone 5mg PO Q4h prn severe pain ,   - DVT ppx: SCDS, enoxaparin 40mg subq daily  - PT, WBS: WBAT  - monitor drain output  - Dispo- pending pt clearance and drain d/c    Ortho Pager [phone]

## 2021-05-14 LAB
ANION GAP SERPL CALC-SCNC: 11 MMOL/L — SIGNIFICANT CHANGE UP (ref 5–17)
BUN SERPL-MCNC: 16 MG/DL — SIGNIFICANT CHANGE UP (ref 7–23)
CALCIUM SERPL-MCNC: 8.6 MG/DL — SIGNIFICANT CHANGE UP (ref 8.4–10.5)
CHLORIDE SERPL-SCNC: 102 MMOL/L — SIGNIFICANT CHANGE UP (ref 96–108)
CO2 SERPL-SCNC: 29 MMOL/L — SIGNIFICANT CHANGE UP (ref 22–31)
CREAT SERPL-MCNC: 0.77 MG/DL — SIGNIFICANT CHANGE UP (ref 0.5–1.3)
GLUCOSE SERPL-MCNC: 98 MG/DL — SIGNIFICANT CHANGE UP (ref 70–99)
HCT VFR BLD CALC: 29.8 % — LOW (ref 34.5–45)
HGB BLD-MCNC: 9.3 G/DL — LOW (ref 11.5–15.5)
MCHC RBC-ENTMCNC: 30.4 PG — SIGNIFICANT CHANGE UP (ref 27–34)
MCHC RBC-ENTMCNC: 31.2 GM/DL — LOW (ref 32–36)
MCV RBC AUTO: 97.4 FL — SIGNIFICANT CHANGE UP (ref 80–100)
NRBC # BLD: 1 /100 WBCS — HIGH (ref 0–0)
PLATELET # BLD AUTO: 217 K/UL — SIGNIFICANT CHANGE UP (ref 150–400)
POTASSIUM SERPL-MCNC: 3.8 MMOL/L — SIGNIFICANT CHANGE UP (ref 3.5–5.3)
POTASSIUM SERPL-SCNC: 3.8 MMOL/L — SIGNIFICANT CHANGE UP (ref 3.5–5.3)
RBC # BLD: 3.06 M/UL — LOW (ref 3.8–5.2)
RBC # FLD: 14.8 % — HIGH (ref 10.3–14.5)
SODIUM SERPL-SCNC: 142 MMOL/L — SIGNIFICANT CHANGE UP (ref 135–145)
WBC # BLD: 13.44 K/UL — HIGH (ref 3.8–10.5)
WBC # FLD AUTO: 13.44 K/UL — HIGH (ref 3.8–10.5)

## 2021-05-14 RX ORDER — LACTULOSE 10 G/15ML
20 SOLUTION ORAL ONCE
Refills: 0 | Status: COMPLETED | OUTPATIENT
Start: 2021-05-14 | End: 2021-05-14

## 2021-05-14 RX ADMIN — Medication 100 MILLIGRAM(S): at 15:52

## 2021-05-14 RX ADMIN — GABAPENTIN 100 MILLIGRAM(S): 400 CAPSULE ORAL at 06:22

## 2021-05-14 RX ADMIN — METHOCARBAMOL 500 MILLIGRAM(S): 500 TABLET, FILM COATED ORAL at 21:59

## 2021-05-14 RX ADMIN — Medication 1000 MILLIGRAM(S): at 16:20

## 2021-05-14 RX ADMIN — OXYCODONE HYDROCHLORIDE 5 MILLIGRAM(S): 5 TABLET ORAL at 23:40

## 2021-05-14 RX ADMIN — OXYCODONE HYDROCHLORIDE 5 MILLIGRAM(S): 5 TABLET ORAL at 18:45

## 2021-05-14 RX ADMIN — GABAPENTIN 100 MILLIGRAM(S): 400 CAPSULE ORAL at 21:59

## 2021-05-14 RX ADMIN — METHOCARBAMOL 500 MILLIGRAM(S): 500 TABLET, FILM COATED ORAL at 06:22

## 2021-05-14 RX ADMIN — Medication 1000 MILLIGRAM(S): at 07:21

## 2021-05-14 RX ADMIN — ENOXAPARIN SODIUM 40 MILLIGRAM(S): 100 INJECTION SUBCUTANEOUS at 12:49

## 2021-05-14 RX ADMIN — LACTULOSE 20 GRAM(S): 10 SOLUTION ORAL at 12:49

## 2021-05-14 RX ADMIN — Medication 100 MILLIGRAM(S): at 06:21

## 2021-05-14 RX ADMIN — GABAPENTIN 100 MILLIGRAM(S): 400 CAPSULE ORAL at 15:52

## 2021-05-14 RX ADMIN — Medication 10 MILLIGRAM(S): at 06:21

## 2021-05-14 RX ADMIN — OXYCODONE HYDROCHLORIDE 5 MILLIGRAM(S): 5 TABLET ORAL at 14:01

## 2021-05-14 RX ADMIN — POLYETHYLENE GLYCOL 3350 17 GRAM(S): 17 POWDER, FOR SOLUTION ORAL at 12:49

## 2021-05-14 RX ADMIN — Medication 1000 MILLIGRAM(S): at 06:21

## 2021-05-14 RX ADMIN — Medication 1000 MILLIGRAM(S): at 21:59

## 2021-05-14 RX ADMIN — METHOCARBAMOL 500 MILLIGRAM(S): 500 TABLET, FILM COATED ORAL at 15:52

## 2021-05-14 RX ADMIN — Medication 1000 MILLIGRAM(S): at 22:45

## 2021-05-14 RX ADMIN — OXYCODONE HYDROCHLORIDE 5 MILLIGRAM(S): 5 TABLET ORAL at 22:57

## 2021-05-14 RX ADMIN — Medication 1 TABLET(S): at 06:22

## 2021-05-14 RX ADMIN — Medication 1000 MILLIGRAM(S): at 15:52

## 2021-05-14 RX ADMIN — Medication 100 MILLIGRAM(S): at 22:21

## 2021-05-14 NOTE — PROGRESS NOTE ADULT - SUBJECTIVE AND OBJECTIVE BOX
Orthopaedic Surgery Progress Note    Patient seen and examined. JAG overnight. Patient without complaints, pain well controlled. Doing well with PT. Denies CP, SOB, N/V, tactile fevers, calf pain.    Vital Signs Last 24 Hrs  T(C): 36.8 (14 May 2021 05:33), Max: 36.8 (14 May 2021 05:33)  T(F): 98.2 (14 May 2021 05:33), Max: 98.2 (14 May 2021 05:33)  HR: 88 (14 May 2021 05:33) (88 - 100)  BP: 110/74 (14 May 2021 05:33) (100/69 - 111/73)  BP(mean): --  RR: 18 (14 May 2021 05:33) (17 - 18)  SpO2: 94% (14 May 2021 05:33) (94% - 95%)    Physical Exam:  Pt laying comfortably in bed, NAD.  Skin warm and well perfused, no visible erythema/ecchymoses.  Dressing C/D/I; abdomen soft and nontender. HVx1, JPx1  EHL/FHL/TA/GS 5/5 bilateral lower extremities, SLT in tact to distal bilateral lower extremities  Calves soft and nontender to palpation  DP pules palpable     Labs:                          8.7    11.08 )-----------( 192      ( 11 May 2021 07:58 )             27.4     05-11    141  |  102  |  12  ----------------------------<  128<H>  4.3   |  30  |  0.72    Ca    8.8      11 May 2021 07:58  Phos  3.4     05-11  Mg     2.0     05-11                A/P: 65F s/p ALIF L4-L5 on 5/6, PSF L3-5 5/10, stable postop  - Pain control   - PT/WBAT   - DVT prophylaxis: SCDs, lovenox  - DC both drains today  - Encourage incentive spirometry  - Dispo: HPT

## 2021-05-15 LAB
ANION GAP SERPL CALC-SCNC: 10 MMOL/L — SIGNIFICANT CHANGE UP (ref 5–17)
BUN SERPL-MCNC: 15 MG/DL — SIGNIFICANT CHANGE UP (ref 7–23)
CALCIUM SERPL-MCNC: 9 MG/DL — SIGNIFICANT CHANGE UP (ref 8.4–10.5)
CHLORIDE SERPL-SCNC: 100 MMOL/L — SIGNIFICANT CHANGE UP (ref 96–108)
CO2 SERPL-SCNC: 29 MMOL/L — SIGNIFICANT CHANGE UP (ref 22–31)
CREAT SERPL-MCNC: 0.77 MG/DL — SIGNIFICANT CHANGE UP (ref 0.5–1.3)
GLUCOSE SERPL-MCNC: 92 MG/DL — SIGNIFICANT CHANGE UP (ref 70–99)
HCT VFR BLD CALC: 29.8 % — LOW (ref 34.5–45)
HGB BLD-MCNC: 9.4 G/DL — LOW (ref 11.5–15.5)
MCHC RBC-ENTMCNC: 30.9 PG — SIGNIFICANT CHANGE UP (ref 27–34)
MCHC RBC-ENTMCNC: 31.5 GM/DL — LOW (ref 32–36)
MCV RBC AUTO: 98 FL — SIGNIFICANT CHANGE UP (ref 80–100)
NRBC # BLD: 0 /100 WBCS — SIGNIFICANT CHANGE UP (ref 0–0)
PLATELET # BLD AUTO: 247 K/UL — SIGNIFICANT CHANGE UP (ref 150–400)
POTASSIUM SERPL-MCNC: 4.3 MMOL/L — SIGNIFICANT CHANGE UP (ref 3.5–5.3)
POTASSIUM SERPL-SCNC: 4.3 MMOL/L — SIGNIFICANT CHANGE UP (ref 3.5–5.3)
RBC # BLD: 3.04 M/UL — LOW (ref 3.8–5.2)
RBC # FLD: 14.5 % — SIGNIFICANT CHANGE UP (ref 10.3–14.5)
SODIUM SERPL-SCNC: 139 MMOL/L — SIGNIFICANT CHANGE UP (ref 135–145)
WBC # BLD: 13.3 K/UL — HIGH (ref 3.8–10.5)
WBC # FLD AUTO: 13.3 K/UL — HIGH (ref 3.8–10.5)

## 2021-05-15 RX ORDER — BACITRACIN ZINC 500 UNIT/G
1 OINTMENT IN PACKET (EA) TOPICAL THREE TIMES A DAY
Refills: 0 | Status: DISCONTINUED | OUTPATIENT
Start: 2021-05-15 | End: 2021-05-18

## 2021-05-15 RX ADMIN — GABAPENTIN 100 MILLIGRAM(S): 400 CAPSULE ORAL at 06:34

## 2021-05-15 RX ADMIN — Medication 1 TABLET(S): at 07:11

## 2021-05-15 RX ADMIN — ENOXAPARIN SODIUM 40 MILLIGRAM(S): 100 INJECTION SUBCUTANEOUS at 13:13

## 2021-05-15 RX ADMIN — OXYCODONE HYDROCHLORIDE 5 MILLIGRAM(S): 5 TABLET ORAL at 10:59

## 2021-05-15 RX ADMIN — Medication 1000 MILLIGRAM(S): at 14:13

## 2021-05-15 RX ADMIN — GABAPENTIN 100 MILLIGRAM(S): 400 CAPSULE ORAL at 13:12

## 2021-05-15 RX ADMIN — Medication 1000 MILLIGRAM(S): at 13:13

## 2021-05-15 RX ADMIN — OXYCODONE HYDROCHLORIDE 5 MILLIGRAM(S): 5 TABLET ORAL at 09:59

## 2021-05-15 RX ADMIN — POLYETHYLENE GLYCOL 3350 17 GRAM(S): 17 POWDER, FOR SOLUTION ORAL at 13:12

## 2021-05-15 RX ADMIN — GABAPENTIN 100 MILLIGRAM(S): 400 CAPSULE ORAL at 22:10

## 2021-05-15 RX ADMIN — Medication 100 MILLIGRAM(S): at 06:29

## 2021-05-15 RX ADMIN — Medication 1000 MILLIGRAM(S): at 06:29

## 2021-05-15 RX ADMIN — OXYCODONE HYDROCHLORIDE 5 MILLIGRAM(S): 5 TABLET ORAL at 05:06

## 2021-05-15 RX ADMIN — HYDROMORPHONE HYDROCHLORIDE 0.5 MILLIGRAM(S): 2 INJECTION INTRAMUSCULAR; INTRAVENOUS; SUBCUTANEOUS at 07:00

## 2021-05-15 RX ADMIN — HYDROMORPHONE HYDROCHLORIDE 0.5 MILLIGRAM(S): 2 INJECTION INTRAMUSCULAR; INTRAVENOUS; SUBCUTANEOUS at 06:29

## 2021-05-15 RX ADMIN — OXYCODONE HYDROCHLORIDE 5 MILLIGRAM(S): 5 TABLET ORAL at 04:06

## 2021-05-15 RX ADMIN — Medication 10 MILLIGRAM(S): at 13:12

## 2021-05-15 RX ADMIN — Medication 1000 MILLIGRAM(S): at 07:15

## 2021-05-15 RX ADMIN — METHOCARBAMOL 500 MILLIGRAM(S): 500 TABLET, FILM COATED ORAL at 22:13

## 2021-05-15 RX ADMIN — METHOCARBAMOL 500 MILLIGRAM(S): 500 TABLET, FILM COATED ORAL at 13:12

## 2021-05-15 RX ADMIN — Medication 1000 MILLIGRAM(S): at 23:10

## 2021-05-15 RX ADMIN — Medication 100 MILLIGRAM(S): at 22:10

## 2021-05-15 RX ADMIN — Medication 100 MILLIGRAM(S): at 13:12

## 2021-05-15 RX ADMIN — METHOCARBAMOL 500 MILLIGRAM(S): 500 TABLET, FILM COATED ORAL at 06:34

## 2021-05-15 RX ADMIN — Medication 1000 MILLIGRAM(S): at 22:10

## 2021-05-15 RX ADMIN — OXYCODONE HYDROCHLORIDE 5 MILLIGRAM(S): 5 TABLET ORAL at 18:58

## 2021-05-15 NOTE — PROGRESS NOTE ADULT - SUBJECTIVE AND OBJECTIVE BOX
Ortho Note    Pt comfortable without complaints, pain controlled  Denies CP, SOB, N/V, numbness/tingling     Vital Signs Last 24 Hrs  T(C): 36.9 (05-15-21 @ 06:16), Max: 36.9 (05-15-21 @ 06:16)  T(F): 98.4 (05-15-21 @ 06:16), Max: 98.4 (05-15-21 @ 06:16)  HR: 94 (05-15-21 @ 07:14) (91 - 94)  BP: 110/73 (05-15-21 @ 07:14) (109/73 - 112/62)  BP(mean): --  RR: 18 (05-15-21 @ 06:16) (18 - 18)  SpO2: 94% (05-15-21 @ 06:16) (94% - 94%)  AVSS    General: Pt Alert and oriented, NAD  Back and abd DSG C/D/I  Sensation intact BL LE  Motor strength intact BL LE        A/P: 65F s/p ALIF L4-L5 on 5/6, PSF L3-5 5/10  - stable  - Pain control   - PT/WBAT   - DVT prophylaxis: SCDs, lovenox  - Dispo: rehab     Ortho Pager [phone]

## 2021-05-16 LAB
ANION GAP SERPL CALC-SCNC: 10 MMOL/L — SIGNIFICANT CHANGE UP (ref 5–17)
BUN SERPL-MCNC: 13 MG/DL — SIGNIFICANT CHANGE UP (ref 7–23)
CALCIUM SERPL-MCNC: 9.1 MG/DL — SIGNIFICANT CHANGE UP (ref 8.4–10.5)
CHLORIDE SERPL-SCNC: 100 MMOL/L — SIGNIFICANT CHANGE UP (ref 96–108)
CO2 SERPL-SCNC: 29 MMOL/L — SIGNIFICANT CHANGE UP (ref 22–31)
CREAT SERPL-MCNC: 0.77 MG/DL — SIGNIFICANT CHANGE UP (ref 0.5–1.3)
GLUCOSE SERPL-MCNC: 92 MG/DL — SIGNIFICANT CHANGE UP (ref 70–99)
HCT VFR BLD CALC: 30.1 % — LOW (ref 34.5–45)
HGB BLD-MCNC: 9.4 G/DL — LOW (ref 11.5–15.5)
MCHC RBC-ENTMCNC: 30.9 PG — SIGNIFICANT CHANGE UP (ref 27–34)
MCHC RBC-ENTMCNC: 31.2 GM/DL — LOW (ref 32–36)
MCV RBC AUTO: 99 FL — SIGNIFICANT CHANGE UP (ref 80–100)
NRBC # BLD: 0 /100 WBCS — SIGNIFICANT CHANGE UP (ref 0–0)
PLATELET # BLD AUTO: 271 K/UL — SIGNIFICANT CHANGE UP (ref 150–400)
POTASSIUM SERPL-MCNC: 4.4 MMOL/L — SIGNIFICANT CHANGE UP (ref 3.5–5.3)
POTASSIUM SERPL-SCNC: 4.4 MMOL/L — SIGNIFICANT CHANGE UP (ref 3.5–5.3)
RBC # BLD: 3.04 M/UL — LOW (ref 3.8–5.2)
RBC # FLD: 15.2 % — HIGH (ref 10.3–14.5)
SODIUM SERPL-SCNC: 139 MMOL/L — SIGNIFICANT CHANGE UP (ref 135–145)
WBC # BLD: 11.33 K/UL — HIGH (ref 3.8–10.5)
WBC # FLD AUTO: 11.33 K/UL — HIGH (ref 3.8–10.5)

## 2021-05-16 RX ADMIN — OXYCODONE HYDROCHLORIDE 5 MILLIGRAM(S): 5 TABLET ORAL at 23:00

## 2021-05-16 RX ADMIN — OXYCODONE HYDROCHLORIDE 5 MILLIGRAM(S): 5 TABLET ORAL at 13:07

## 2021-05-16 RX ADMIN — Medication 1000 MILLIGRAM(S): at 22:02

## 2021-05-16 RX ADMIN — OXYCODONE HYDROCHLORIDE 5 MILLIGRAM(S): 5 TABLET ORAL at 13:41

## 2021-05-16 RX ADMIN — METHOCARBAMOL 500 MILLIGRAM(S): 500 TABLET, FILM COATED ORAL at 13:07

## 2021-05-16 RX ADMIN — GABAPENTIN 100 MILLIGRAM(S): 400 CAPSULE ORAL at 06:06

## 2021-05-16 RX ADMIN — Medication 100 MILLIGRAM(S): at 13:07

## 2021-05-16 RX ADMIN — METHOCARBAMOL 500 MILLIGRAM(S): 500 TABLET, FILM COATED ORAL at 21:21

## 2021-05-16 RX ADMIN — Medication 1000 MILLIGRAM(S): at 06:07

## 2021-05-16 RX ADMIN — METHOCARBAMOL 500 MILLIGRAM(S): 500 TABLET, FILM COATED ORAL at 06:06

## 2021-05-16 RX ADMIN — GABAPENTIN 100 MILLIGRAM(S): 400 CAPSULE ORAL at 13:07

## 2021-05-16 RX ADMIN — Medication 100 MILLIGRAM(S): at 21:22

## 2021-05-16 RX ADMIN — OXYCODONE HYDROCHLORIDE 5 MILLIGRAM(S): 5 TABLET ORAL at 18:25

## 2021-05-16 RX ADMIN — Medication 1 TABLET(S): at 06:07

## 2021-05-16 RX ADMIN — Medication 1000 MILLIGRAM(S): at 21:21

## 2021-05-16 RX ADMIN — Medication 1000 MILLIGRAM(S): at 13:41

## 2021-05-16 RX ADMIN — Medication 1000 MILLIGRAM(S): at 13:07

## 2021-05-16 RX ADMIN — POLYETHYLENE GLYCOL 3350 17 GRAM(S): 17 POWDER, FOR SOLUTION ORAL at 13:07

## 2021-05-16 RX ADMIN — ENOXAPARIN SODIUM 40 MILLIGRAM(S): 100 INJECTION SUBCUTANEOUS at 13:07

## 2021-05-16 RX ADMIN — OXYCODONE HYDROCHLORIDE 5 MILLIGRAM(S): 5 TABLET ORAL at 22:02

## 2021-05-16 RX ADMIN — OXYCODONE HYDROCHLORIDE 5 MILLIGRAM(S): 5 TABLET ORAL at 19:01

## 2021-05-16 RX ADMIN — Medication 1000 MILLIGRAM(S): at 07:07

## 2021-05-16 RX ADMIN — OXYCODONE HYDROCHLORIDE 5 MILLIGRAM(S): 5 TABLET ORAL at 04:31

## 2021-05-16 RX ADMIN — GABAPENTIN 100 MILLIGRAM(S): 400 CAPSULE ORAL at 21:22

## 2021-05-16 RX ADMIN — Medication 100 MILLIGRAM(S): at 06:08

## 2021-05-16 RX ADMIN — OXYCODONE HYDROCHLORIDE 5 MILLIGRAM(S): 5 TABLET ORAL at 03:31

## 2021-05-16 NOTE — PROGRESS NOTE ADULT - SUBJECTIVE AND OBJECTIVE BOX
Ortho Note    Pt comfortable without complaints, pain controlled  Denies CP, SOB, N/V, numbness/tingling     Vital Signs Last 24 Hrs  T(C): 36.6 (05-16-21 @ 05:49), Max: 36.6 (05-16-21 @ 05:49)  T(F): 97.8 (05-16-21 @ 05:49), Max: 97.8 (05-16-21 @ 05:49)  HR: 100 (05-16-21 @ 05:49) (100 - 100)  BP: 138/84 (05-16-21 @ 05:49) (138/84 - 138/84)  BP(mean): --  RR: 16 (05-16-21 @ 05:49) (16 - 16)  SpO2: 95% (05-16-21 @ 05:49) (95% - 95%)  AVSS    General: Pt Alert and oriented, NAD  Back and abd DSG C/D/I  Sensation intact BL LE  Motor strength intact BL LE        A/P: 65F s/p ALIF L4-L5 on 5/6, PSF L3-5 5/10  - stable  - Pain control   - PT/WBAT   - DVT prophylaxis: SCDs, lovenox  - Dispo: rehab pending authorization     Ortho Pager [phone]

## 2021-05-16 NOTE — PROGRESS NOTE ADULT - SUBJECTIVE AND OBJECTIVE BOX
Ortho attending note    doing well, reports mild back stiffness this am that resolved, currently without pain in legs and back.  no fevers/chills/drainage    incision abd: well healing, no sign of infection  incision post: well healing, superficial periincisional blister right buttocks  5/5 L2-S1 b/l  SILT L2-S1 b/l  wwp    XR 5/13: hardware, alignment appropriate    A/P: doing well  -lovenox while in house  -dual planning for home vs. rehab, possibly home with VNS tomorrow  -incisions covered, change daily with dry gazue and tegaderm  -may shower daily wihtout dressings, replace after shower  -if blister pops, apply bacitracin ointment daily until healed and cover with telfa/tegaderm

## 2021-05-17 LAB
ANION GAP SERPL CALC-SCNC: 10 MMOL/L — SIGNIFICANT CHANGE UP (ref 5–17)
BUN SERPL-MCNC: 13 MG/DL — SIGNIFICANT CHANGE UP (ref 7–23)
CALCIUM SERPL-MCNC: 8.8 MG/DL — SIGNIFICANT CHANGE UP (ref 8.4–10.5)
CHLORIDE SERPL-SCNC: 104 MMOL/L — SIGNIFICANT CHANGE UP (ref 96–108)
CO2 SERPL-SCNC: 25 MMOL/L — SIGNIFICANT CHANGE UP (ref 22–31)
CREAT SERPL-MCNC: 0.68 MG/DL — SIGNIFICANT CHANGE UP (ref 0.5–1.3)
GLUCOSE SERPL-MCNC: 86 MG/DL — SIGNIFICANT CHANGE UP (ref 70–99)
HCT VFR BLD CALC: 29.1 % — LOW (ref 34.5–45)
HGB BLD-MCNC: 9.3 G/DL — LOW (ref 11.5–15.5)
MCHC RBC-ENTMCNC: 30.8 PG — SIGNIFICANT CHANGE UP (ref 27–34)
MCHC RBC-ENTMCNC: 32 GM/DL — SIGNIFICANT CHANGE UP (ref 32–36)
MCV RBC AUTO: 96.4 FL — SIGNIFICANT CHANGE UP (ref 80–100)
NRBC # BLD: 0 /100 WBCS — SIGNIFICANT CHANGE UP (ref 0–0)
PLATELET # BLD AUTO: 276 K/UL — SIGNIFICANT CHANGE UP (ref 150–400)
POTASSIUM SERPL-MCNC: 4.3 MMOL/L — SIGNIFICANT CHANGE UP (ref 3.5–5.3)
POTASSIUM SERPL-SCNC: 4.3 MMOL/L — SIGNIFICANT CHANGE UP (ref 3.5–5.3)
RBC # BLD: 3.02 M/UL — LOW (ref 3.8–5.2)
RBC # FLD: 15.2 % — HIGH (ref 10.3–14.5)
SODIUM SERPL-SCNC: 139 MMOL/L — SIGNIFICANT CHANGE UP (ref 135–145)
WBC # BLD: 12.69 K/UL — HIGH (ref 3.8–10.5)
WBC # FLD AUTO: 12.69 K/UL — HIGH (ref 3.8–10.5)

## 2021-05-17 RX ORDER — OXYCODONE HYDROCHLORIDE 5 MG/1
5 TABLET ORAL EVERY 4 HOURS
Refills: 0 | Status: DISCONTINUED | OUTPATIENT
Start: 2021-05-17 | End: 2021-05-18

## 2021-05-17 RX ADMIN — OXYCODONE HYDROCHLORIDE 5 MILLIGRAM(S): 5 TABLET ORAL at 01:44

## 2021-05-17 RX ADMIN — Medication 100 MILLIGRAM(S): at 21:29

## 2021-05-17 RX ADMIN — METHOCARBAMOL 500 MILLIGRAM(S): 500 TABLET, FILM COATED ORAL at 14:21

## 2021-05-17 RX ADMIN — Medication 1000 MILLIGRAM(S): at 15:23

## 2021-05-17 RX ADMIN — METHOCARBAMOL 500 MILLIGRAM(S): 500 TABLET, FILM COATED ORAL at 05:52

## 2021-05-17 RX ADMIN — METHOCARBAMOL 500 MILLIGRAM(S): 500 TABLET, FILM COATED ORAL at 21:29

## 2021-05-17 RX ADMIN — Medication 1000 MILLIGRAM(S): at 21:29

## 2021-05-17 RX ADMIN — Medication 100 MILLIGRAM(S): at 14:37

## 2021-05-17 RX ADMIN — OXYCODONE HYDROCHLORIDE 5 MILLIGRAM(S): 5 TABLET ORAL at 21:32

## 2021-05-17 RX ADMIN — OXYCODONE HYDROCHLORIDE 5 MILLIGRAM(S): 5 TABLET ORAL at 02:45

## 2021-05-17 RX ADMIN — OXYCODONE HYDROCHLORIDE 5 MILLIGRAM(S): 5 TABLET ORAL at 22:32

## 2021-05-17 RX ADMIN — SENNA PLUS 2 TABLET(S): 8.6 TABLET ORAL at 21:29

## 2021-05-17 RX ADMIN — GABAPENTIN 100 MILLIGRAM(S): 400 CAPSULE ORAL at 21:30

## 2021-05-17 RX ADMIN — Medication 1000 MILLIGRAM(S): at 06:52

## 2021-05-17 RX ADMIN — OXYCODONE HYDROCHLORIDE 5 MILLIGRAM(S): 5 TABLET ORAL at 11:32

## 2021-05-17 RX ADMIN — OXYCODONE HYDROCHLORIDE 5 MILLIGRAM(S): 5 TABLET ORAL at 17:46

## 2021-05-17 RX ADMIN — ENOXAPARIN SODIUM 40 MILLIGRAM(S): 100 INJECTION SUBCUTANEOUS at 12:04

## 2021-05-17 RX ADMIN — GABAPENTIN 100 MILLIGRAM(S): 400 CAPSULE ORAL at 05:52

## 2021-05-17 RX ADMIN — Medication 1000 MILLIGRAM(S): at 22:29

## 2021-05-17 RX ADMIN — Medication 1000 MILLIGRAM(S): at 14:21

## 2021-05-17 RX ADMIN — Medication 100 MILLIGRAM(S): at 05:52

## 2021-05-17 RX ADMIN — Medication 1000 MILLIGRAM(S): at 05:52

## 2021-05-17 RX ADMIN — OXYCODONE HYDROCHLORIDE 5 MILLIGRAM(S): 5 TABLET ORAL at 10:32

## 2021-05-17 RX ADMIN — GABAPENTIN 100 MILLIGRAM(S): 400 CAPSULE ORAL at 14:21

## 2021-05-17 RX ADMIN — OXYCODONE HYDROCHLORIDE 5 MILLIGRAM(S): 5 TABLET ORAL at 16:46

## 2021-05-17 RX ADMIN — Medication 10 MILLIGRAM(S): at 07:48

## 2021-05-17 NOTE — PROGRESS NOTE ADULT - SUBJECTIVE AND OBJECTIVE BOX
Ortho Note      Subjective:  Pt comfortable without complaints, pain controlled with current pain medication regimen.   Denies CP, SOB, N/V, numbness/tingling     Vital Signs Last 24 Hrs  T(C): 36.4 (05-17-21 @ 08:39), Max: 36.4 (05-17-21 @ 08:39)  T(F): 97.5 (05-17-21 @ 08:39), Max: 97.5 (05-17-21 @ 08:39)  HR: 94 (05-17-21 @ 08:39) (94 - 94)  BP: 117/79 (05-17-21 @ 08:39) (117/79 - 117/79)  BP(mean): --  RR: 16 (05-17-21 @ 08:39) (16 - 16)  SpO2: 97% (05-17-21 @ 08:39) (97% - 97%)  AVSS    Objective:    Physical Exam:  General: Pt Alert and oriented, NAD  Abdominal DSG C/D/I, abdomin soft, nontender to touch,   Dressing- lumbar dressing, c,d,i   Pulses: +2 pedal pulses bilaterally  Sensation: SILT bilateral lower extremities  Motor: EHL/FHL/TA/GS- 5/5, firing        Plan of Care:  A/P: 65yFemale s/p ALIF L4-L5 on 5/6, PSF L3-5 5/10  - afebrile, nontoxic appearance  - Continue Ancef 100mg Q8h IV, start Keflex 500mg PO Q8h when she is discharged x1week  - Pain Control- methocarbamol 500mg Po TID, tylenol 1000mg Po Q8h, gabapentin 100mg PO TID, Oxycodone 5mg PO Q4h prn severe pain ,   - DVT ppx: SCDS, enoxaparin 40mg subq daily  - PT, WBS: WBAT  - Dispo- pending pt clearance     Ortho Pager [phone]

## 2021-05-17 NOTE — PROGRESS NOTE ADULT - SUBJECTIVE AND OBJECTIVE BOX
Ortho Note    Pt comfortable without complaints, pain controlled  Denies CP, SOB, N/V, numbness/tingling     Vital Signs Last 24 Hrs  T(C): 37.1 (17 May 2021 05:08), Max: 37.4 (16 May 2021 20:07)  T(F): 98.8 (17 May 2021 05:08), Max: 99.3 (16 May 2021 20:07)  HR: 88 (17 May 2021 05:08) (88 - 92)  BP: 102/62 (17 May 2021 05:08) (102/62 - 122/81)  BP(mean): --  RR: 15 (17 May 2021 05:08) (15 - 16)  SpO2: 96% (17 May 2021 05:08) (94% - 97%)    General: Pt Alert and oriented, NAD  Back and abd DSG C/D/I  Sensation intact BL LE  Motor strength intact BL LE    A/P: 65F s/p ALIF L4-L5 on 5/6, PSF L3-5 5/10  - stable  - Pain control   - PT/WBAT   - DVT prophylaxis: SCDs, lovenox  - Dispo: rehab pending authorization     Ortho Pager [phone]

## 2021-05-17 NOTE — PROGRESS NOTE ADULT - SUBJECTIVE AND OBJECTIVE BOX
Ortho Note    Pt comfortable without complaints, pain controlled  Denies CP, SOB, N/V, numbness/tingling     Vital Signs Last 24 Hrs  T(C): 37.1 (17 May 2021 05:08), Max: 37.4 (16 May 2021 20:07)  T(F): 98.8 (17 May 2021 05:08), Max: 99.3 (16 May 2021 20:07)  HR: 88 (17 May 2021 05:08) (88 - 92)  BP: 102/62 (17 May 2021 05:08) (102/62 - 122/81)  BP(mean): --  RR: 15 (17 May 2021 05:08) (15 - 16)  SpO2: 96% (17 May 2021 05:08) (94% - 97%)    General: Pt Alert and oriented, NAD  Back and abd DSG C/D/I  Sensation intact BL LE  Motor strength intact BL LE    A/P: 65F s/p ALIF L4-L5 on 5/6, PSF L3-5 5/10  - stable  - Pain control   - PT/WBAT   - DVT prophylaxis: SCDlina riversx  - Dispo: home tmrw  Ortho Pager [phone]

## 2021-05-18 ENCOUNTER — TRANSCRIPTION ENCOUNTER (OUTPATIENT)
Age: 66
End: 2021-05-18

## 2021-05-18 VITALS
RESPIRATION RATE: 17 BRPM | OXYGEN SATURATION: 98 % | SYSTOLIC BLOOD PRESSURE: 125 MMHG | TEMPERATURE: 98 F | DIASTOLIC BLOOD PRESSURE: 84 MMHG | HEART RATE: 94 BPM

## 2021-05-18 LAB
ANION GAP SERPL CALC-SCNC: 9 MMOL/L — SIGNIFICANT CHANGE UP (ref 5–17)
BUN SERPL-MCNC: 14 MG/DL — SIGNIFICANT CHANGE UP (ref 7–23)
CALCIUM SERPL-MCNC: 8.6 MG/DL — SIGNIFICANT CHANGE UP (ref 8.4–10.5)
CHLORIDE SERPL-SCNC: 101 MMOL/L — SIGNIFICANT CHANGE UP (ref 96–108)
CO2 SERPL-SCNC: 27 MMOL/L — SIGNIFICANT CHANGE UP (ref 22–31)
CREAT SERPL-MCNC: 0.68 MG/DL — SIGNIFICANT CHANGE UP (ref 0.5–1.3)
GLUCOSE SERPL-MCNC: 96 MG/DL — SIGNIFICANT CHANGE UP (ref 70–99)
HCT VFR BLD CALC: 28.7 % — LOW (ref 34.5–45)
HGB BLD-MCNC: 9.1 G/DL — LOW (ref 11.5–15.5)
MCHC RBC-ENTMCNC: 31 PG — SIGNIFICANT CHANGE UP (ref 27–34)
MCHC RBC-ENTMCNC: 31.7 GM/DL — LOW (ref 32–36)
MCV RBC AUTO: 97.6 FL — SIGNIFICANT CHANGE UP (ref 80–100)
NRBC # BLD: 0 /100 WBCS — SIGNIFICANT CHANGE UP (ref 0–0)
PLATELET # BLD AUTO: 289 K/UL — SIGNIFICANT CHANGE UP (ref 150–400)
POTASSIUM SERPL-MCNC: 3.9 MMOL/L — SIGNIFICANT CHANGE UP (ref 3.5–5.3)
POTASSIUM SERPL-SCNC: 3.9 MMOL/L — SIGNIFICANT CHANGE UP (ref 3.5–5.3)
RBC # BLD: 2.94 M/UL — LOW (ref 3.8–5.2)
RBC # FLD: 15.1 % — HIGH (ref 10.3–14.5)
SODIUM SERPL-SCNC: 137 MMOL/L — SIGNIFICANT CHANGE UP (ref 135–145)
WBC # BLD: 10.84 K/UL — HIGH (ref 3.8–10.5)
WBC # FLD AUTO: 10.84 K/UL — HIGH (ref 3.8–10.5)

## 2021-05-18 RX ORDER — OXYCODONE HYDROCHLORIDE 5 MG/1
1 TABLET ORAL
Qty: 42 | Refills: 0
Start: 2021-05-18 | End: 2021-05-24

## 2021-05-18 RX ORDER — CEPHALEXIN 500 MG
1 CAPSULE ORAL
Qty: 14 | Refills: 0
Start: 2021-05-18 | End: 2021-05-24

## 2021-05-18 RX ORDER — POLYETHYLENE GLYCOL 3350 17 G/17G
17 POWDER, FOR SOLUTION ORAL
Qty: 0 | Refills: 0 | DISCHARGE
Start: 2021-05-18

## 2021-05-18 RX ORDER — ACETAMINOPHEN 500 MG
2 TABLET ORAL
Qty: 84 | Refills: 0
Start: 2021-05-18 | End: 2021-05-31

## 2021-05-18 RX ORDER — SENNA PLUS 8.6 MG/1
2 TABLET ORAL
Qty: 0 | Refills: 0 | DISCHARGE
Start: 2021-05-18

## 2021-05-18 RX ORDER — GABAPENTIN 400 MG/1
1 CAPSULE ORAL
Qty: 90 | Refills: 0
Start: 2021-05-18 | End: 2021-06-16

## 2021-05-18 RX ORDER — GABAPENTIN 400 MG/1
100 CAPSULE ORAL
Qty: 0 | Refills: 0 | DISCHARGE

## 2021-05-18 RX ORDER — ACETAMINOPHEN 500 MG
2 TABLET ORAL
Qty: 0 | Refills: 0 | DISCHARGE

## 2021-05-18 RX ORDER — METHOCARBAMOL 500 MG/1
1 TABLET, FILM COATED ORAL
Qty: 90 | Refills: 0
Start: 2021-05-18 | End: 2021-06-16

## 2021-05-18 RX ORDER — GABAPENTIN 400 MG/1
100 CAPSULE ORAL
Qty: 90 | Refills: 0
Start: 2021-05-18 | End: 2021-06-16

## 2021-05-18 RX ADMIN — OXYCODONE HYDROCHLORIDE 5 MILLIGRAM(S): 5 TABLET ORAL at 03:25

## 2021-05-18 RX ADMIN — Medication 1000 MILLIGRAM(S): at 06:35

## 2021-05-18 RX ADMIN — OXYCODONE HYDROCHLORIDE 5 MILLIGRAM(S): 5 TABLET ORAL at 11:42

## 2021-05-18 RX ADMIN — Medication 1000 MILLIGRAM(S): at 05:35

## 2021-05-18 RX ADMIN — Medication 1 TABLET(S): at 05:36

## 2021-05-18 RX ADMIN — GABAPENTIN 100 MILLIGRAM(S): 400 CAPSULE ORAL at 05:35

## 2021-05-18 RX ADMIN — Medication 100 MILLIGRAM(S): at 05:35

## 2021-05-18 RX ADMIN — OXYCODONE HYDROCHLORIDE 5 MILLIGRAM(S): 5 TABLET ORAL at 10:42

## 2021-05-18 RX ADMIN — METHOCARBAMOL 500 MILLIGRAM(S): 500 TABLET, FILM COATED ORAL at 05:35

## 2021-05-18 RX ADMIN — OXYCODONE HYDROCHLORIDE 5 MILLIGRAM(S): 5 TABLET ORAL at 04:25

## 2021-05-18 RX ADMIN — ENOXAPARIN SODIUM 40 MILLIGRAM(S): 100 INJECTION SUBCUTANEOUS at 11:48

## 2021-05-18 NOTE — PROGRESS NOTE ADULT - NSICDXPILOT_GEN_ALL_CORE
[place]

## 2021-05-18 NOTE — PROGRESS NOTE ADULT - NUTRITIONAL ASSESSMENT
This patient has been assessed with a concern for Malnutrition and has been determined to have a diagnosis/diagnoses of Morbid obesity (BMI > 40).    This patient is being managed with:   Diet Clear Liquid-  Entered: May 11 2021 12:34AM    
This patient has been assessed with a concern for Malnutrition and has been determined to have a diagnosis/diagnoses of Morbid obesity (BMI > 40).    This patient is being managed with:   Diet Regular-  DASH/TLC {Sodium & Cholesterol Restricted} (DASH)  Entered: May 12 2021  7:11AM    
This patient has been assessed with a concern for Malnutrition and has been determined to have a diagnosis/diagnoses of Morbid obesity (BMI > 40).    This patient is being managed with:   Diet Regular-  DASH/TLC {Sodium & Cholesterol Restricted} (DASH)  Entered: May 12 2021  7:11AM    
This patient has been assessed with a concern for Malnutrition and has been determined to have a diagnosis/diagnoses of Morbid obesity (BMI > 40).    This patient is being managed with:   Diet Clear Liquid-  Entered: May 11 2021 12:34AM    
This patient has been assessed with a concern for Malnutrition and has been determined to have a diagnosis/diagnoses of Morbid obesity (BMI > 40).    This patient is being managed with:   Diet NPO after Midnight-     NPO Start Date: 09-May-2021   NPO Start Time: 23:59  Except Medications  Entered: May  9 2021  1:00PM    
This patient has been assessed with a concern for Malnutrition and has been determined to have a diagnosis/diagnoses of Morbid obesity (BMI > 40).    This patient is being managed with:   Diet Regular-  DASH/TLC {Sodium & Cholesterol Restricted} (DASH)  Entered: May 12 2021  7:11AM    
This patient has been assessed with a concern for Malnutrition and has been determined to have a diagnosis/diagnoses of Morbid obesity (BMI > 40).    This patient is being managed with:   Diet Regular-  DASH/TLC {Sodium & Cholesterol Restricted} (DASH)  Entered: May 12 2021  7:11AM    
This patient has been assessed with a concern for Malnutrition and has been determined to have a diagnosis/diagnoses of Morbid obesity (BMI > 40).    This patient is being managed with:   Diet Regular-  Entered: May 18 2021 12:35PM    
This patient has been assessed with a concern for Malnutrition and has been determined to have a diagnosis/diagnoses of Morbid obesity (BMI > 40).    This patient is being managed with:   Diet Regular-  DASH/TLC {Sodium & Cholesterol Restricted} (DASH)  Entered: May 12 2021  7:11AM    
This patient has been assessed with a concern for Malnutrition and has been determined to have a diagnosis/diagnoses of Morbid obesity (BMI > 40).    This patient is being managed with:   Diet Clear Liquid-  Entered: May 11 2021 12:34AM    
This patient has been assessed with a concern for Malnutrition and has been determined to have a diagnosis/diagnoses of Morbid obesity (BMI > 40).    This patient is being managed with:   Diet Regular-  DASH/TLC {Sodium & Cholesterol Restricted} (DASH)  Entered: May 12 2021  7:11AM    

## 2021-05-18 NOTE — PROGRESS NOTE ADULT - SUBJECTIVE AND OBJECTIVE BOX
Ortho Note      Subjective:  Pt comfortable without complaints, pain controlled with current pain medication regimen.   Denies CP, SOB, N/V, numbness/tingling     Vital Signs Last 24 Hrs  T(C): 36.7 (18 May 2021 05:34), Max: 37.1 (17 May 2021 15:45)  T(F): 98.1 (18 May 2021 05:34), Max: 98.7 (17 May 2021 15:45)  HR: 88 (18 May 2021 05:34) (87 - 98)  BP: 110/69 (18 May 2021 05:34) (106/70 - 122/73)  BP(mean): --  RR: 18 (18 May 2021 05:34) (16 - 18)  SpO2: 97% (18 May 2021 05:34) (97% - 97%)  AVSS    Objective:    Physical Exam:  General: Pt Alert and oriented, NAD  Abdominal DSG C/D/I, abdomin soft, nontender to touch,   Dressing- lumbar dressing, c,d,i   Pulses: +2 pedal pulses bilaterally  Sensation: SILT bilateral lower extremities  Motor: EHL/FHL/TA/GS- 5/5, firing        Plan of Care:  A/P: 65yFemale s/p ALIF L4-L5 on 5/6, PSF L3-5 5/10  - afebrile, nontoxic appearance  - Continue Ancef 100mg Q8h IV, start Keflex 500mg PO Q8h when she is discharged x1week  - Pain Control  - DVT ppx: SCDS, enoxaparin 40mg subq daily  - PT, WBS: WBAT  - Dispo- home today     Ortho Pager [phone]

## 2021-05-18 NOTE — PROGRESS NOTE ADULT - REASON FOR ADMISSION
low back pain

## 2021-05-18 NOTE — PROGRESS NOTE ADULT - PROVIDER SPECIALTY LIST ADULT
Orthopedics
Vascular Surgery
Orthopedics
Plastic Surgery
SICU
Vascular Surgery
Orthopedics
Vascular Surgery
Internal Medicine
Internal Medicine

## 2021-05-18 NOTE — PROGRESS NOTE ADULT - SUBJECTIVE AND OBJECTIVE BOX
Ortho Note    Subjective:  Pt comfortable without complaints, pain controlled  Denies CP, SOB, N/V, numbness/tingling     Vital Signs Last 24 Hrs  T(C): 36.7 (05-18-21 @ 08:34), Max: 36.7 (05-18-21 @ 08:34)  T(F): 98.1 (05-18-21 @ 08:34), Max: 98.1 (05-18-21 @ 08:34)  HR: 94 (05-18-21 @ 08:34) (94 - 94)  BP: 125/84 (05-18-21 @ 08:34) (125/84 - 125/84)  BP(mean): --  RR: 17 (05-18-21 @ 08:34) (17 - 17)  SpO2: 98% (05-18-21 @ 08:34) (98% - 98%)  AVSS    Objective:    Physical Exam:  General: Pt Alert and oriented, NAD  Abdominal incision intact, abdomen soft, nontender to touch,   Dressing- lumbar dressing, c,d,i   Pulses: +2 pedal pulses bilaterally  Sensation: SILT bilateral lower extremities  Motor: EHL/FHL/TA/GS- 5/5, firing        Plan of Care:  A/P: 65yFemale s/p ALIF L4-L5 on 5/6, PSF L3-5 5/10  - afebrile, nontoxic appearance  - Continue Ancef 100mg Q8h IV, start Keflex 500mg PO Q8h when she is discharged x1week  - Pain Control- methocarbamol 500mg Po TID, tylenol 1000mg Po Q8h, gabapentin 100mg PO TID, Oxycodone 5mg PO Q4h prn severe pain ,   - DVT ppx: SCDS, enoxaparin 40mg subq daily  - PT, WBS: WBAT  - Dispo- d/c home today    Ortho Pager [phone]

## 2021-05-18 NOTE — DISCHARGE NOTE NURSING/CASE MANAGEMENT/SOCIAL WORK - PATIENT PORTAL LINK FT
You can access the FollowMyHealth Patient Portal offered by [facility] by registering at the following website: [email or web address]. By joining [email or web address]’s FollowMyHealth portal, you will also be able to view your health information using other applications (apps) compatible with our system.

## 2021-05-20 ENCOUNTER — NON-APPOINTMENT (OUTPATIENT)
Age: 66
End: 2021-05-20

## 2021-05-21 DIAGNOSIS — M43.16 SPONDYLOLISTHESIS, LUMBAR REGION: ICD-10-CM

## 2021-05-21 DIAGNOSIS — I97.88 OTHER INTRAOPERATIVE COMPLICATIONS OF THE CIRCULATORY SYSTEM, NOT ELSEWHERE CLASSIFIED: ICD-10-CM

## 2021-05-21 DIAGNOSIS — M47.816 SPONDYLOSIS WITHOUT MYELOPATHY OR RADICULOPATHY, LUMBAR REGION: ICD-10-CM

## 2021-05-21 DIAGNOSIS — I95.9 HYPOTENSION, UNSPECIFIED: ICD-10-CM
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
## 2021-05-21 DIAGNOSIS — M53.2X6 SPINAL INSTABILITIES, LUMBAR REGION: ICD-10-CM

## 2021-05-21 DIAGNOSIS — Z91.040 LATEX ALLERGY STATUS: ICD-10-CM

## 2021-05-21 DIAGNOSIS — I10 ESSENTIAL (PRIMARY) HYPERTENSION: ICD-10-CM

## 2021-05-21 DIAGNOSIS — J45.909 UNSPECIFIED ASTHMA, UNCOMPLICATED: ICD-10-CM

## 2021-05-21 DIAGNOSIS — Z96.643 PRESENCE OF ARTIFICIAL HIP JOINT, BILATERAL: ICD-10-CM
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
## 2021-05-21 DIAGNOSIS — M40.36 FLATBACK SYNDROME, LUMBAR REGION: ICD-10-CM

## 2021-05-21 DIAGNOSIS — Z79.84 LONG TERM (CURRENT) USE OF ORAL HYPOGLYCEMIC DRUGS: ICD-10-CM

## 2021-05-21 DIAGNOSIS — M51.36 OTHER INTERVERTEBRAL DISC DEGENERATION, LUMBAR REGION: ICD-10-CM

## 2021-05-21 DIAGNOSIS — R09.02 HYPOXEMIA: ICD-10-CM

## 2021-05-21 DIAGNOSIS — Z88.0 ALLERGY STATUS TO PENICILLIN: ICD-10-CM

## 2021-05-21 DIAGNOSIS — E78.5 HYPERLIPIDEMIA, UNSPECIFIED: ICD-10-CM

## 2021-05-21 DIAGNOSIS — E66.01 MORBID (SEVERE) OBESITY DUE TO EXCESS CALORIES: ICD-10-CM

## 2021-05-21 DIAGNOSIS — M48.062 SPINAL STENOSIS, LUMBAR REGION WITH NEUROGENIC CLAUDICATION: ICD-10-CM

## 2021-06-01 ENCOUNTER — OUTPATIENT (OUTPATIENT)
Dept: OUTPATIENT SERVICES | Facility: HOSPITAL | Age: 66
LOS: 1 days | End: 2021-06-01
Payer: COMMERCIAL

## 2021-06-01 ENCOUNTER — RESULT REVIEW (OUTPATIENT)
Age: 66
End: 2021-06-01

## 2021-06-01 ENCOUNTER — APPOINTMENT (OUTPATIENT)
Dept: ORTHOPEDIC SURGERY | Facility: CLINIC | Age: 66
End: 2021-06-01
Payer: MEDICAID

## 2021-06-01 VITALS — TEMPERATURE: 98.4 F

## 2021-06-01 DIAGNOSIS — Z96.641 PRESENCE OF RIGHT ARTIFICIAL HIP JOINT: Chronic | ICD-10-CM

## 2021-06-01 DIAGNOSIS — Z98.89 OTHER SPECIFIED POSTPROCEDURAL STATES: Chronic | ICD-10-CM

## 2021-06-01 PROCEDURE — 72100 X-RAY EXAM L-S SPINE 2/3 VWS: CPT

## 2021-06-01 PROCEDURE — 99024 POSTOP FOLLOW-UP VISIT: CPT

## 2021-06-01 PROCEDURE — 72100 X-RAY EXAM L-S SPINE 2/3 VWS: CPT | Mod: 26

## 2021-06-08 NOTE — HISTORY OF PRESENT ILLNESS
[de-identified] : 3 weeks post op [de-identified] : s/p L4/L5 ALIF; L4/L5 laminectomy, psf, doing well. Reports resolution of right buttock pain. Low back pain significantly improved from preop. Able to get in/out of bed more easily. Off opioid pain medications. Denies fever/chills/drainage from incision. Ambulating with walker for stability outside of house, cane while at home. Working with home PT. Saw Dr. Hooper last week for wound care, has follow up appointment this Thursday. [de-identified] : Well healing incisions, no erythema, drainage, or warmth\par 5/5 strength L2-S1 b/l\par SILT L2-S1 b/l\par wwp\par neg homans b/l [de-identified] : XR 6/1/21: hardware intact, stable alignment, unchanged from intraop [de-identified] : s/p L4/L5 ALIF; L4/L5 laminectomy, psf, doing well. Low back pain and mobility improved from preop. No sign of infection. [de-identified] : Wound and activity instructions given\par Continue working with home PT--work on weaning from assistive devices\par Follow up with Dr. Hooper for wound care\par Follow up with me in 1 month, sooner if there is an issue\par XR lumbar AP/lateral at that time\par All questions answered

## 2021-06-22 PROCEDURE — 80053 COMPREHEN METABOLIC PANEL: CPT

## 2021-06-22 PROCEDURE — 86850 RBC ANTIBODY SCREEN: CPT

## 2021-06-22 PROCEDURE — 82330 ASSAY OF CALCIUM: CPT

## 2021-06-22 PROCEDURE — 83605 ASSAY OF LACTIC ACID: CPT

## 2021-06-22 PROCEDURE — 93005 ELECTROCARDIOGRAM TRACING: CPT

## 2021-06-22 PROCEDURE — 85384 FIBRINOGEN ACTIVITY: CPT

## 2021-06-22 PROCEDURE — 83036 HEMOGLOBIN GLYCOSYLATED A1C: CPT

## 2021-06-22 PROCEDURE — C1713: CPT

## 2021-06-22 PROCEDURE — U0003: CPT

## 2021-06-22 PROCEDURE — 36415 COLL VENOUS BLD VENIPUNCTURE: CPT

## 2021-06-22 PROCEDURE — 86769 SARS-COV-2 COVID-19 ANTIBODY: CPT

## 2021-06-22 PROCEDURE — U0005: CPT

## 2021-06-22 PROCEDURE — 84295 ASSAY OF SERUM SODIUM: CPT

## 2021-06-22 PROCEDURE — 85610 PROTHROMBIN TIME: CPT

## 2021-06-22 PROCEDURE — 71275 CT ANGIOGRAPHY CHEST: CPT

## 2021-06-22 PROCEDURE — 97161 PT EVAL LOW COMPLEX 20 MIN: CPT

## 2021-06-22 PROCEDURE — 82962 GLUCOSE BLOOD TEST: CPT

## 2021-06-22 PROCEDURE — 71045 X-RAY EXAM CHEST 1 VIEW: CPT

## 2021-06-22 PROCEDURE — 80048 BASIC METABOLIC PNL TOTAL CA: CPT

## 2021-06-22 PROCEDURE — 85018 HEMOGLOBIN: CPT

## 2021-06-22 PROCEDURE — 76000 FLUOROSCOPY <1 HR PHYS/QHP: CPT

## 2021-06-22 PROCEDURE — 85027 COMPLETE CBC AUTOMATED: CPT

## 2021-06-22 PROCEDURE — 84100 ASSAY OF PHOSPHORUS: CPT

## 2021-06-22 PROCEDURE — 84132 ASSAY OF SERUM POTASSIUM: CPT

## 2021-06-22 PROCEDURE — 86901 BLOOD TYPING SEROLOGIC RH(D): CPT

## 2021-06-22 PROCEDURE — C1889: CPT

## 2021-06-22 PROCEDURE — 97164 PT RE-EVAL EST PLAN CARE: CPT

## 2021-06-22 PROCEDURE — 72148 MRI LUMBAR SPINE W/O DYE: CPT

## 2021-06-22 PROCEDURE — 97116 GAIT TRAINING THERAPY: CPT

## 2021-06-22 PROCEDURE — 83735 ASSAY OF MAGNESIUM: CPT

## 2021-06-22 PROCEDURE — 85025 COMPLETE CBC W/AUTO DIFF WBC: CPT

## 2021-06-22 PROCEDURE — 93306 TTE W/DOPPLER COMPLETE: CPT

## 2021-06-22 PROCEDURE — 86923 COMPATIBILITY TEST ELECTRIC: CPT

## 2021-06-22 PROCEDURE — 72100 X-RAY EXAM L-S SPINE 2/3 VWS: CPT

## 2021-06-22 PROCEDURE — 82803 BLOOD GASES ANY COMBINATION: CPT

## 2021-06-22 PROCEDURE — 97530 THERAPEUTIC ACTIVITIES: CPT

## 2021-06-22 PROCEDURE — 81001 URINALYSIS AUTO W/SCOPE: CPT

## 2021-06-22 PROCEDURE — 86900 BLOOD TYPING SEROLOGIC ABO: CPT

## 2021-07-06 ENCOUNTER — APPOINTMENT (OUTPATIENT)
Dept: ORTHOPEDIC SURGERY | Facility: CLINIC | Age: 66
End: 2021-07-06
Payer: MEDICAID

## 2021-07-06 ENCOUNTER — OUTPATIENT (OUTPATIENT)
Dept: OUTPATIENT SERVICES | Facility: HOSPITAL | Age: 66
LOS: 1 days | End: 2021-07-06
Payer: COMMERCIAL

## 2021-07-06 ENCOUNTER — RESULT REVIEW (OUTPATIENT)
Age: 66
End: 2021-07-06

## 2021-07-06 DIAGNOSIS — Z96.641 PRESENCE OF RIGHT ARTIFICIAL HIP JOINT: Chronic | ICD-10-CM

## 2021-07-06 DIAGNOSIS — Z98.89 OTHER SPECIFIED POSTPROCEDURAL STATES: Chronic | ICD-10-CM

## 2021-07-06 PROCEDURE — 72100 X-RAY EXAM L-S SPINE 2/3 VWS: CPT

## 2021-07-06 PROCEDURE — 99024 POSTOP FOLLOW-UP VISIT: CPT

## 2021-07-06 PROCEDURE — 72100 X-RAY EXAM L-S SPINE 2/3 VWS: CPT | Mod: 26

## 2021-07-06 NOTE — HISTORY OF PRESENT ILLNESS
[de-identified] : 2 months post op [de-identified] : s/p L4/L5 ALIF; L4/L5 laminectomy, psf, doing well. Reports resolution of right buttock pain. Low back pain resolved. Able to get in/out of bed more easily. Ambulating with cane, weaned from walker. [de-identified] : Well healed incisions, no erythema, drainage, or warmth\par 5/5 strength L2-S1 b/l\par SILT L2-S1 b/l\par wwp\par neg homans b/l [de-identified] : XR 7/6/21: hardware intact, stable alignment, unchanged from intraop\par \par XR 6/1/21: hardware intact, stable alignment, unchanged from intraop [de-identified] : s/p L4/L5 ALIF; L4/L5 laminectomy, psf, doing well. Low back pain and mobility improved from preop. No sign of infection. [de-identified] : Wound and activity instructions given\par Purchased exercise bike, ok to use\par Work on weaning from assistive device\par Follow up with me in 6 weeks, sooner if there is an issue\par XR lumbar 4 view at that time\par All questions answered

## 2021-08-02 ENCOUNTER — APPOINTMENT (OUTPATIENT)
Dept: ORTHOPEDIC SURGERY | Facility: CLINIC | Age: 66
End: 2021-08-02
Payer: MEDICAID

## 2021-08-02 PROCEDURE — 99214 OFFICE O/P EST MOD 30 MIN: CPT | Mod: 24,25

## 2021-08-02 PROCEDURE — 20610 DRAIN/INJ JOINT/BURSA W/O US: CPT | Mod: 79,LT

## 2021-08-02 NOTE — PHYSICAL EXAM
[de-identified] : General appearance: well nourished and hydrated, pleasant, alert and oriented x 3, cooperative. Obese body habitus.\par HEENT: normocephalic, EOM intact, wearing mask, external auditory canal clear. \par Cardiovascular: no lower leg edema, no varicosities, dorsalis pedis pulses palpable and symmetric. \par Lymphatics: no palpable lymphadenopathy, no lymphedema. \par Neurologic: sensation is normal, no muscle weakness in upper or lower extremities, patella tendon reflexes present and symmetric. \par Dermatologic: skin moist, warm, no rash. \par Spine: cervical spine with normal lordosis and painless range of motion, thoracic spine with normal kyphosis and painless range of motion, lumbosacral spine with normal lordosis and restricted uncomfortable range of motion. \par Gait: slow to stand and get started. Bilaterally antalgic with hunched forward posture.\par \par Left knee:\par - Inspection: difficult to assess swelling; negative ecchymosis and erythema. \par - Wounds: none. \par - Alignment: normal. \par - Palpation: medial and lateral joint line, peripatellar tenderness on palpation. \par - ROM active: 0-125, discomfort with deep flexion\par - Ligamentous laxity: negative Lachman, negative ant. drawer test, negative post. drawer test, negative pivot shift test, stable to varus stress, stable to valgus stress. \par - Popliteal angle: 45 degrees\par - Muscle Test: 5/5 quad strength. \par \par Right knee:\par - Inspection: difficult to assess swelling; negative ecchymosis and erythema. \par - Wounds: none. \par - Alignment: normal. \par - Palpation: medial and lateral joint line, peripatellar tenderness on palpation. \par - ROM active: 0-125, discomfort with deep flexion\par - Ligamentous laxity: negative Lachman, negative ant. drawer test, negative post. drawer test, negative pivot shift test, stable to varus stress, stable to valgus stress. \par - Popliteal angle: 45 degrees\par - Muscle Test: 5/5 quad strength. \par \par Limb lengths clinically equal; no contractures or deformity noted at hips, knees, or ankles\par \par Left hip:\par - Healed posterolateral incision and pelvic stab incisions\par - No swelling/ecchymosis\par - No specific tenderness on palpation\par - ROM: 90 flexion, 0 extension, 10 adduction, 40 abduction, 15 internal rotation, 75 external rotation\par - JOSE painless\par - FADIR painless\par - Desiree negative\par - Stinchfield negative\par - Flexor power 5/5\par - Abductor power 5/5\par \par Right hip:\par - Healed posterolateral incision and pelvic stab incisions\par - No swelling/ecchymosis\par - No specific tenderness on palpation\par - ROM: 90 flexion, 0 extension, 10 adduction, 40 abduction, 15 internal rotation, 75 external rotation\par - JOSE painless\par - FADIR painless\par - Desiree negative\par - Stinchfield negative\par - Flexor power 5/5\par - Abductor power 5/5

## 2021-08-02 NOTE — HISTORY OF PRESENT ILLNESS
[de-identified] : 8/2/21: Reports about 5mo pain relief from last visit's CSI. Would like another. Hips remain fine. Insurance denied HA. She is already doing PT for the right shoulder but would take a HEP for the knees.\par \par 2/4/21: 66y/o female following up for bilateral knee osteoarthritis. Also s/p bilateral JOVI (R Oct 2016, L Sept 2019 by Dr. Raygoza, [facility] superior). The bilateral knee CSI from May was very effective but has recently begun to wear off, and she would like to repeat them today. The HA was never authorized. She does recall having had a good response to HA in the past, but this was under a difference insurance plan. The hips remain fine. She is having increasing problems relating to her low back and is planning to follow up with Dr. Velazco for possible surgical discussion. She is not currently exercising at all and would be open to getting back into PT.

## 2021-08-02 NOTE — DISCUSSION/SUMMARY
[de-identified] : 66y/o female with bilateral knee osteoarthritis, bilateral well-functioning total hip replacements\par - CSI administered today to both knees\par - AAOS HEP packet given\par - HEP encouraged\par - Cont Tylenol + gabapentin as needed for pain\par - RTC q3+mo as needed for repeat injections or earlier if she would like to try Visco-3. We reviewed again that bilateral TKA will likely be needed for definitive treatment down the line, though she doesn't feel that it is necessary at this time.

## 2021-08-09 ENCOUNTER — OUTPATIENT (OUTPATIENT)
Dept: OUTPATIENT SERVICES | Facility: HOSPITAL | Age: 66
LOS: 1 days | End: 2021-08-09
Payer: COMMERCIAL

## 2021-08-09 ENCOUNTER — RESULT REVIEW (OUTPATIENT)
Age: 66
End: 2021-08-09

## 2021-08-09 ENCOUNTER — APPOINTMENT (OUTPATIENT)
Dept: ORTHOPEDIC SURGERY | Facility: CLINIC | Age: 66
End: 2021-08-09
Payer: MEDICAID

## 2021-08-09 DIAGNOSIS — Z98.89 OTHER SPECIFIED POSTPROCEDURAL STATES: Chronic | ICD-10-CM

## 2021-08-09 DIAGNOSIS — Z96.641 PRESENCE OF RIGHT ARTIFICIAL HIP JOINT: Chronic | ICD-10-CM

## 2021-08-09 PROCEDURE — ZZZZZ: CPT

## 2021-08-09 PROCEDURE — 72110 X-RAY EXAM L-2 SPINE 4/>VWS: CPT | Mod: 26

## 2021-08-09 PROCEDURE — 72110 X-RAY EXAM L-2 SPINE 4/>VWS: CPT

## 2021-08-09 NOTE — HISTORY OF PRESENT ILLNESS
[de-identified] : 3 months post op [de-identified] : s/p L4/L5 ALIF; L4/L5 laminectomy, psf, doing well. Reports resolution of right buttock pain. Low back pain resolved. Able to get in/out of bed more easily. Ambulating with cane. [de-identified] : Well healed incisions, no erythema, drainage, or warmth\par 5/5 strength L2-S1 b/l\par SILT L2-S1 b/l\par wwp\par neg homans b/l [de-identified] : XR 8/9/21: hardware intact, stable alignment, unchanged from intraop\par \par XR 6/1/21: hardware intact, stable alignment, unchanged from intraop [de-identified] : s/p L4/L5 ALIF; L4/L5 laminectomy, psf, doing well. Low back pain and mobility improved from preop. No sign of infection. [de-identified] : Wound and activity instructions given\par Work on weaning from assistive device\par Follow up with me in 3 months, sooner if there is an issue\par XR lumbar 4 view at that time\par All questions answered

## 2021-08-17 ENCOUNTER — APPOINTMENT (OUTPATIENT)
Dept: ORTHOPEDIC SURGERY | Facility: CLINIC | Age: 66
End: 2021-08-17

## 2021-12-14 ENCOUNTER — RESULT REVIEW (OUTPATIENT)
Age: 66
End: 2021-12-14

## 2021-12-14 ENCOUNTER — OUTPATIENT (OUTPATIENT)
Dept: OUTPATIENT SERVICES | Facility: HOSPITAL | Age: 66
LOS: 1 days | End: 2021-12-14
Payer: MEDICARE

## 2021-12-14 ENCOUNTER — APPOINTMENT (OUTPATIENT)
Dept: ORTHOPEDIC SURGERY | Facility: CLINIC | Age: 66
End: 2021-12-14
Payer: MEDICAID

## 2021-12-14 DIAGNOSIS — Z98.89 OTHER SPECIFIED POSTPROCEDURAL STATES: Chronic | ICD-10-CM

## 2021-12-14 DIAGNOSIS — Z96.641 PRESENCE OF RIGHT ARTIFICIAL HIP JOINT: Chronic | ICD-10-CM

## 2021-12-14 PROCEDURE — 72110 X-RAY EXAM L-2 SPINE 4/>VWS: CPT

## 2021-12-14 PROCEDURE — 72110 X-RAY EXAM L-2 SPINE 4/>VWS: CPT | Mod: 26

## 2021-12-14 PROCEDURE — 99214 OFFICE O/P EST MOD 30 MIN: CPT

## 2021-12-14 NOTE — DISCUSSION/SUMMARY
[de-identified] : Discussed my findings with the patient. Ms. Lai is doing well 6 months post op. XR stable. She will follow up with me at 1 year post op, sooner if there is an issue. XR lumbar 4 view at that time. All questions answered.

## 2021-12-14 NOTE — HISTORY OF PRESENT ILLNESS
[de-identified] : Follow up 12/14/21: 6 months s/p L4/L5 ALIF; L4/L5 laminectomy, psf, doing well. Reports resolution of low back and lower extremity pain. Denies new neurologic symptoms. Very happy with result to date.

## 2021-12-14 NOTE — PHYSICAL EXAM
[de-identified] : Well healed incisions\par 5/5 strength L2-S1 b/l\par SILT L2-S1 b/l\par wwp\par neg homans b/l.  [de-identified] : XR 12/14/21: hardware intact, stable alignment, unchanged from intraop.  apperas to be fused posteriorly bilaterally.  appears to have anterior fusion developing additionally.\par

## 2021-12-21 ENCOUNTER — APPOINTMENT (OUTPATIENT)
Dept: ORTHOPEDIC SURGERY | Facility: CLINIC | Age: 66
End: 2021-12-21
Payer: MEDICAID

## 2021-12-21 PROCEDURE — 20610 DRAIN/INJ JOINT/BURSA W/O US: CPT | Mod: LT

## 2022-05-10 ENCOUNTER — APPOINTMENT (OUTPATIENT)
Dept: ORTHOPEDIC SURGERY | Facility: CLINIC | Age: 67
End: 2022-05-10
Payer: MEDICARE

## 2022-05-10 VITALS
HEART RATE: 101 BPM | WEIGHT: 219 LBS | OXYGEN SATURATION: 98 % | HEIGHT: 64 IN | TEMPERATURE: 99.68 F | DIASTOLIC BLOOD PRESSURE: 80 MMHG | SYSTOLIC BLOOD PRESSURE: 126 MMHG | BODY MASS INDEX: 37.39 KG/M2

## 2022-05-10 DIAGNOSIS — M53.3 SACROCOCCYGEAL DISORDERS, NOT ELSEWHERE CLASSIFIED: ICD-10-CM

## 2022-05-10 DIAGNOSIS — G89.29 SACROCOCCYGEAL DISORDERS, NOT ELSEWHERE CLASSIFIED: ICD-10-CM

## 2022-05-10 PROCEDURE — 99213 OFFICE O/P EST LOW 20 MIN: CPT

## 2022-05-10 NOTE — PHYSICAL EXAM
[de-identified] : General: patient is well developed, well nourished, in no acute \par distress, alert and oriented x 3. \par \par Mood and affect: normal\par \par Respiratory: no respiratory distress noted\par \par Alignment:The spine is well compensated in the coronal and sagittal plane. \par \par Skin: Well healed anterior and posterior incision\par \par Gait: The patient is able to toe walk and heel walk without difficulty. \par \par Palpation: no tenderness to palpation spine or paraspinal region\par \par Range of motion: Lumbar spine ROM is full\par \par Neurologic Exam:\par Motor: Manual Muscle testing in the lower extremities is 5 out of 5 in all muscle groups. There is no evidence of muscular atrophy in the lower extremities. Sensory: Sensation to light touch is grossly intact in the lower extremities\par \par Hip Exam: No pain with internal or external rotation of hips bilaterally\par \par Special tests: Straight leg raise test negative. Cross straight leg test negative.  [de-identified] : XR  Lumbar AP/Lateral, flexion/extension 05/10/2022 (my read): Hardware intact, stable alignment. Appears to be fused anteriorly and posteriorly. Stable Grade 1 spondylolisthesis of L3/4. L5/S1 stable severe degenerative disc disease.\par \par XR 12/14/21: Hardware intact, stable alignment, unchanged from intraop.  Appears to be fused posteriorly bilaterally.  appears to have anterior fusion developing additionally.

## 2022-05-10 NOTE — HISTORY OF PRESENT ILLNESS
[de-identified] : Follow up 5/10/22: Patient presents for follow up 1 year s/p L4/5 ALIF and L4-5 laminectomy PSF instrumentation doing well. She reports resolution of her lower back and lower extremity radiating pain denies new neurologic symptoms. She is quite happy with her result to date. She does report new right buttock pain. She has a history of bilateral THR with Dr. Isidro Raygoza. \par \par Follow up 12/14/21: 6 months s/p L4/L5 ALIF; L4/L5 laminectomy, psf, doing well. Reports resolution of low back and lower extremity pain. Denies new neurologic symptoms. Very happy with result to date.

## 2022-05-10 NOTE — DISCUSSION/SUMMARY
[de-identified] : Diagnosis: 1 year postoperative  L4/5 anterior/posterior fusion.\par \par Patient is doing quite well with resolution of her back pain and lower extremity radiculopathy. I have recommended physical therapy fro her right buttock pain. She will follow up with me in 1 year an x-ray lumbar 4 view at that time. All questions answered.

## 2022-05-10 NOTE — ADDENDUM
[FreeTextEntry1] : I, Milena Luong, assisted with documentation on 05/10/2022  acting as scribe for Dr. Jose Velazco.

## 2022-06-07 ENCOUNTER — RESULT REVIEW (OUTPATIENT)
Age: 67
End: 2022-06-07

## 2022-06-07 ENCOUNTER — APPOINTMENT (OUTPATIENT)
Dept: ORTHOPEDIC SURGERY | Facility: CLINIC | Age: 67
End: 2022-06-07
Payer: MEDICAID

## 2022-06-07 ENCOUNTER — OUTPATIENT (OUTPATIENT)
Dept: OUTPATIENT SERVICES | Facility: HOSPITAL | Age: 67
LOS: 1 days | End: 2022-06-07
Payer: MEDICARE

## 2022-06-07 VITALS
WEIGHT: 221 LBS | HEIGHT: 64 IN | BODY MASS INDEX: 37.73 KG/M2 | HEART RATE: 96 BPM | SYSTOLIC BLOOD PRESSURE: 117 MMHG | DIASTOLIC BLOOD PRESSURE: 79 MMHG | OXYGEN SATURATION: 97 % | TEMPERATURE: 208.4 F

## 2022-06-07 DIAGNOSIS — Z98.89 OTHER SPECIFIED POSTPROCEDURAL STATES: Chronic | ICD-10-CM

## 2022-06-07 DIAGNOSIS — Z96.641 PRESENCE OF RIGHT ARTIFICIAL HIP JOINT: Chronic | ICD-10-CM

## 2022-06-07 PROCEDURE — 20610 DRAIN/INJ JOINT/BURSA W/O US: CPT | Mod: RT

## 2022-06-07 PROCEDURE — 73564 X-RAY EXAM KNEE 4 OR MORE: CPT | Mod: 26,50

## 2022-06-07 PROCEDURE — 73564 X-RAY EXAM KNEE 4 OR MORE: CPT

## 2022-06-12 NOTE — PROCEDURE
[de-identified] : Reports nearly 6mo relief from last bilateral knee CSI; would like to repeat today. Happy with progress following lumbar fusion but not interested in any more surgery for the near future. \par \par Bilateral knee XRs were taken today and reviewed with her. Bilateral severe tricompartmental osteoarthritis, progressed from prior imaging.\par \par Procedure: Knee joint injection\par Laterality: bilateral\par Indication: Osteoarthritis - discussed with patient\par Skin prep: alcohol and chlorhexidine\par Anesthesia: ethyl chloride spray\par Needle: 20-gauge\par Portal: inferolateral\par Aspiration: none\par Injectate: 2cc of 2% lidocaine, 2cc of 0.5% bupivacaine, and 1cc of 40mg/mL triamcinolone\par Dressing: Band-aid\par Complications: None\par \par Follow up 3mo, no new XRs needed. Will be a candidate for TKA when she feels ready.

## 2022-06-12 NOTE — PROCEDURE
[de-identified] : Reports nearly 6mo relief from last bilateral knee CSI; would like to repeat today. Happy with progress following lumbar fusion but not interested in any more surgery for the near future. \par \par Bilateral knee XRs were taken today and reviewed with her. Bilateral severe tricompartmental osteoarthritis, progressed from prior imaging.\par \par Procedure: Knee joint injection\par Laterality: bilateral\par Indication: Osteoarthritis - discussed with patient\par Skin prep: alcohol and chlorhexidine\par Anesthesia: ethyl chloride spray\par Needle: 20-gauge\par Portal: inferolateral\par Aspiration: none\par Injectate: 2cc of 2% lidocaine, 2cc of 0.5% bupivacaine, and 1cc of 40mg/mL triamcinolone\par Dressing: Band-aid\par Complications: None\par \par Follow up 3mo, no new XRs needed. Will be a candidate for TKA when she feels ready.

## 2022-09-08 ENCOUNTER — APPOINTMENT (OUTPATIENT)
Dept: OTOLARYNGOLOGY | Facility: CLINIC | Age: 67
End: 2022-09-08

## 2022-09-08 VITALS
HEART RATE: 79 BPM | SYSTOLIC BLOOD PRESSURE: 111 MMHG | HEIGHT: 64 IN | TEMPERATURE: 208.04 F | DIASTOLIC BLOOD PRESSURE: 83 MMHG | BODY MASS INDEX: 37.73 KG/M2 | WEIGHT: 221 LBS

## 2022-09-08 DIAGNOSIS — H93.13 TINNITUS, BILATERAL: ICD-10-CM

## 2022-09-08 DIAGNOSIS — H91.93 UNSPECIFIED HEARING LOSS, BILATERAL: ICD-10-CM

## 2022-09-08 PROCEDURE — 99204 OFFICE O/P NEW MOD 45 MIN: CPT

## 2022-09-08 PROCEDURE — 92557 COMPREHENSIVE HEARING TEST: CPT

## 2022-09-08 PROCEDURE — 92550 TYMPANOMETRY & REFLEX THRESH: CPT

## 2022-09-08 NOTE — PHYSICAL EXAM
[de-identified] : eac clear, tm intact and mobile, me clear [Midline] : trachea located in midline position [Normal] : no rashes

## 2022-09-08 NOTE — ASSESSMENT
[FreeTextEntry1] : 66F here for initial evaluation. For the past few months, she c/o hearing a hissing sound in both ears. It is nonpulsatile and loudest in quiet settings, though it can vary in severity. There is no otorrhea, otalgia or vertigo. She has known hearing loss. Audiogram from today shows essentially symmetric mild sloping to severe SNHL with excellent speech discrimination and normal tympanograms. Head and neck exam is unremarkable.\par Tinnitus due to hearing loss which was discussed. Reviewed masking techniques and diet/lifestyle modifications. Offered hearing aid trail which she will consider. RTO as needed.\par

## 2022-09-08 NOTE — CONSULT LETTER
[Dear  ___] : Dear  [unfilled], [Courtesy Letter:] : I had the pleasure of seeing your patient, [unfilled], in my office today. [Consult Closing:] : Thank you very much for allowing me to participate in the care of this patient.  If you have any questions, please do not hesitate to contact me. [Sincerely,] : Sincerely, [FreeTextEntry3] : Sean Bird MD\par Department of Otolaryngology - Head and Neck Surgery\par [facility]

## 2022-09-08 NOTE — HISTORY OF PRESENT ILLNESS
[de-identified] : 66F here for initial evaluation.\par \par For the past few months, she c/o hearing a hissing sound in both ears. It is nonpulsatile and loudest in quiet settings, though it can vary in severity. There is no otorrhea, otalgia or vertigo. She has known hearing loss.\par \par Audiogram from today:\par -essentially symmetric mild sloping to severe SNHL\par -SRT 35, 100% discrim\par -type A tymps\par \par ROS otherwise unremarkable.

## 2022-09-16 ENCOUNTER — APPOINTMENT (OUTPATIENT)
Dept: ORTHOPEDIC SURGERY | Facility: CLINIC | Age: 67
End: 2022-09-16

## 2022-09-16 VITALS
SYSTOLIC BLOOD PRESSURE: 126 MMHG | OXYGEN SATURATION: 98 % | HEIGHT: 64 IN | DIASTOLIC BLOOD PRESSURE: 86 MMHG | BODY MASS INDEX: 37.73 KG/M2 | HEART RATE: 83 BPM | WEIGHT: 221 LBS

## 2022-09-16 PROCEDURE — 20610 DRAIN/INJ JOINT/BURSA W/O US: CPT | Mod: RT

## 2022-09-27 NOTE — PROCEDURE
[de-identified] : Procedure: Knee joint injection\par Laterality: bilateral\par Indication: Osteoarthritis - discussed with patient\par Skin prep: alcohol and chlorhexidine\par Anesthesia: ethyl chloride spray\par Needle: 20-gauge\par Portal: inferolateral\par Aspiration: none\par Injectate: 2cc of 2% lidocaine, 2cc of 0.5% bupivacaine, and 1cc of 40mg/mL triamcinolone\par Dressing: Band-aid\par Complications: None\par \par RTC q3mo as needed for further injections

## 2022-09-27 NOTE — PROCEDURE
[de-identified] : Procedure: Knee joint injection\par Laterality: bilateral\par Indication: Osteoarthritis - discussed with patient\par Skin prep: alcohol and chlorhexidine\par Anesthesia: ethyl chloride spray\par Needle: 20-gauge\par Portal: inferolateral\par Aspiration: none\par Injectate: 2cc of 2% lidocaine, 2cc of 0.5% bupivacaine, and 1cc of 40mg/mL triamcinolone\par Dressing: Band-aid\par Complications: None\par \par RTC q3mo as needed for further injections

## 2022-12-14 ENCOUNTER — APPOINTMENT (OUTPATIENT)
Dept: ORTHOPEDIC SURGERY | Facility: CLINIC | Age: 67
End: 2022-12-14
Payer: MEDICARE

## 2022-12-14 VITALS
BODY MASS INDEX: 37.73 KG/M2 | WEIGHT: 221 LBS | SYSTOLIC BLOOD PRESSURE: 132 MMHG | DIASTOLIC BLOOD PRESSURE: 85 MMHG | HEIGHT: 64 IN | OXYGEN SATURATION: 98 % | HEART RATE: 92 BPM

## 2022-12-14 DIAGNOSIS — G89.29 PAIN IN LEFT HIP: ICD-10-CM

## 2022-12-14 DIAGNOSIS — M25.552 PAIN IN LEFT HIP: ICD-10-CM

## 2022-12-14 DIAGNOSIS — M17.12 UNILATERAL PRIMARY OSTEOARTHRITIS, LEFT KNEE: ICD-10-CM

## 2022-12-14 DIAGNOSIS — M16.12 UNILATERAL PRIMARY OSTEOARTHRITIS, LEFT HIP: ICD-10-CM

## 2022-12-14 PROCEDURE — 20610 DRAIN/INJ JOINT/BURSA W/O US: CPT | Mod: 50

## 2022-12-14 NOTE — PROCEDURE
[de-identified] : 12/14/22: 66 y/o female presents for followup of bilateral knee osteoarthritis. She reports previous CSI provided about 2 months of relief and she would like to repeat injections today. She would also like a new script to return to PT.\par \par Procedure: Knee joint injection\par Laterality:  bilateral\par Indication: Osteoarthritis - discussed with patient\par Skin prep: alcohol and chlorhexidine\par Anesthesia: ethyl chloride spray\par Needle: 20-gauge\par Portal: inferolateral\par Aspiration: none\par Injectate: 2cc of 2% lidocaine, 2cc of 0.5% bupivacaine, and 1cc of 40mg/mL triamcinolone\par Dressing: Band-aid\par Complications: None\par \par - F/U in 3 months to continue serial CSI injections\par - New PT script provided\par

## 2022-12-14 NOTE — PROCEDURE
[de-identified] : 12/14/22: 68 y/o female presents for followup of bilateral knee osteoarthritis. She reports previous CSI provided about 2 months of relief and she would like to repeat injections today. She would also like a new script to return to PT.\par \par Procedure: Knee joint injection\par Laterality:  bilateral\par Indication: Osteoarthritis - discussed with patient\par Skin prep: alcohol and chlorhexidine\par Anesthesia: ethyl chloride spray\par Needle: 20-gauge\par Portal: inferolateral\par Aspiration: none\par Injectate: 2cc of 2% lidocaine, 2cc of 0.5% bupivacaine, and 1cc of 40mg/mL triamcinolone\par Dressing: Band-aid\par Complications: None\par \par - F/U in 3 months to continue serial CSI injections\par - New PT script provided\par

## 2023-01-01 NOTE — PATIENT PROFILE ADULT - INFLUENZA IMMUNIZATION DATE (APPROXIMATE)
[Initial - Scheduled] : an initial, scheduled visit with concerns of [Tongue tie] : tongue tie [Parents] : parents 06-Sep-2019

## 2023-01-05 NOTE — DIETITIAN INITIAL EVALUATION ADULT. - CALCULATED FROM (CAL/KG)
4298 Spironolactone Counseling: Patient advised regarding risks of diarrhea, abdominal pain, hyperkalemia, birth defects (for female patients), liver toxicity and renal toxicity. The patient may need blood work to monitor liver and kidney function and potassium levels while on therapy. The patient verbalized understanding of the proper use and possible adverse effects of spironolactone.  All of the patient's questions and concerns were addressed.

## 2023-03-15 ENCOUNTER — RESULT REVIEW (OUTPATIENT)
Age: 68
End: 2023-03-15

## 2023-03-15 ENCOUNTER — OUTPATIENT (OUTPATIENT)
Dept: OUTPATIENT SERVICES | Facility: HOSPITAL | Age: 68
LOS: 1 days | End: 2023-03-15
Payer: MEDICARE

## 2023-03-15 ENCOUNTER — APPOINTMENT (OUTPATIENT)
Dept: ORTHOPEDIC SURGERY | Facility: CLINIC | Age: 68
End: 2023-03-15
Payer: MEDICARE

## 2023-03-15 VITALS
BODY MASS INDEX: 37.73 KG/M2 | WEIGHT: 221 LBS | HEART RATE: 107 BPM | SYSTOLIC BLOOD PRESSURE: 115 MMHG | DIASTOLIC BLOOD PRESSURE: 84 MMHG | HEIGHT: 64 IN | OXYGEN SATURATION: 97 %

## 2023-03-15 DIAGNOSIS — Z96.641 PRESENCE OF RIGHT ARTIFICIAL HIP JOINT: Chronic | ICD-10-CM

## 2023-03-15 DIAGNOSIS — Z98.89 OTHER SPECIFIED POSTPROCEDURAL STATES: Chronic | ICD-10-CM

## 2023-03-15 PROCEDURE — 20610 DRAIN/INJ JOINT/BURSA W/O US: CPT | Mod: 50

## 2023-03-15 PROCEDURE — 71046 X-RAY EXAM CHEST 2 VIEWS: CPT

## 2023-03-15 PROCEDURE — 99214 OFFICE O/P EST MOD 30 MIN: CPT | Mod: 25

## 2023-03-15 PROCEDURE — 71046 X-RAY EXAM CHEST 2 VIEWS: CPT | Mod: 26

## 2023-03-16 NOTE — END OF VISIT
[FreeTextEntry3] : All medical record entries made by the Scribe were at my, Dr. Mateo Mitchell, direction and personally dictated by me on 03/15/2023. I have reviewed the chart and agree that the record accurately reflects my personal performance of the history, physical exam, assessment and plan. I have also personally directed, reviewed, and agreed with the chart.

## 2023-03-16 NOTE — PROCEDURE
[de-identified] : Procedure: Knee joint injection\par Laterality: bilateral \par Indication: Osteoarthritis - discussed with patient\par Skin prep: alcohol and chlorhexidine\par Anesthesia: ethyl chloride spray\par Needle: 20-gauge\par Portal: inferolateral\par Aspiration: none\par Injectate: 2cc of 2% lidocaine, 2cc of 0.5% bupivacaine, and 1cc of 40mg/mL triamcinolone\par Dressing: Band-aid\par Complications: None

## 2023-03-16 NOTE — PHYSICAL EXAM
[de-identified] : General appearance: well nourished and hydrated, pleasant, alert and oriented x 3, cooperative. Obese body habitus.\par HEENT: normocephalic, EOM intact, wearing mask, external auditory canal clear. \par Cardiovascular: no lower leg edema, no varicosities, dorsalis pedis pulses palpable and symmetric. \par Lymphatics: no palpable lymphadenopathy, no lymphedema. \par Neurologic: sensation is normal, no muscle weakness in upper or lower extremities, patella tendon reflexes present and symmetric. \par Dermatologic: skin moist, warm, no rash. \par Spine: cervical spine with normal lordosis and painless range of motion, thoracic spine with normal kyphosis and painless range of motion, lumbosacral spine with normal lordosis and restricted uncomfortable range of motion. \par Gait: slow to stand and get started. Bilaterally antalgic with hunched forward posture.\par \par Left knee:\par - Inspection: difficult to assess swelling; negative ecchymosis and erythema. \par - Wounds: none. \par - Alignment: normal. \par - Palpation: medial and lateral joint line, peripatellar tenderness on palpation. \par - ROM active: 0-125, discomfort with deep flexion\par - Ligamentous laxity: negative Lachman, negative ant. drawer test, negative post. drawer test, negative pivot shift test, stable to varus stress, stable to valgus stress. \par - Popliteal angle: 45 degrees\par - Muscle Test: 5/5 quad strength. \par \par Right knee:\par - Inspection: difficult to assess swelling; negative ecchymosis and erythema. \par - Wounds: none. \par - Alignment: normal. \par - Palpation: medial and lateral joint line, peripatellar tenderness on palpation. \par - ROM active: 0-125, discomfort with deep flexion\par - Ligamentous laxity: negative Lachman, negative ant. drawer test, negative post. drawer test, negative pivot shift test, stable to varus stress, stable to valgus stress. \par - Popliteal angle: 45 degrees\par - Muscle Test: 5/5 quad strength. \par \par Limb lengths clinically equal; no contractures or deformity noted at hips, knees, or ankles\par \par Left hip:\par - Healed posterolateral incision and pelvic stab incisions\par - No swelling/ecchymosis\par - No specific tenderness on palpation\par - ROM: 90 flexion, 0 extension, 10 adduction, 40 abduction, 15 internal rotation, 75 external rotation\par - JOSE painless\par - FADIR painless\par - Desiree negative\par - Stinchfield negative\par - Flexor power 5/5\par - Abductor power 5/5\par \par Right hip:\par - Healed posterolateral incision and pelvic stab incisions\par - No swelling/ecchymosis\par - No specific tenderness on palpation\par - ROM: 90 flexion, 0 extension, 10 adduction, 40 abduction, 15 internal rotation, 75 external rotation\par - JOSE painless\par - FADIR painless\par - Desiree negative\par - Stinchfield negative\par - Flexor power 5/5\par - Abductor power 5/5

## 2023-03-16 NOTE — ADDENDUM
[FreeTextEntry1] : Documented by Princess Blair acting as a scribe for Dr. Mateo Mitchell on 03/15/2023.

## 2023-03-16 NOTE — HISTORY OF PRESENT ILLNESS
[de-identified] : Bilateral JOVI (R Oct 2016, L Sept 2019) by Dr. Raygoza\par \par 03/15/2023: 66 y/o female f/u for b/l THAs and b/l knee OA. She reports diminishing relief from her most recent CSI to both knees administered on 12/14/22. While she would like to repeat b/l knee CSI today, she is also interested in scheduling left TKA sometime in the summer. \par \par 8/2/21: Reports about 5mo pain relief from last visit's CSI. Would like another. Hips remain fine. Insurance denied HA. She is already doing PT for the right shoulder but would take a HEP for the knees.\par \par 2/4/21: 66y/o female following up for bilateral knee osteoarthritis. Also s/p bilateral JOVI (R Oct 2016, L Sept 2019 by Dr. Raygoza, robotic superior). The bilateral knee CSI from May was very effective but has recently begun to wear off, and she would like to repeat them today. The HA was never authorized. She does recall having had a good response to HA in the past, but this was under a difference insurance plan. The hips remain fine. She is having increasing problems relating to her low back and is planning to follow up with Dr. Velazco for possible surgical discussion. She is not currently exercising at all and would be open to getting back into PT.

## 2023-03-16 NOTE — DISCUSSION/SUMMARY
[de-identified] : 68 y/o female with well functioning b/l total hip arthroplasties and severe b/l knee osteoarthritis, left more symptomatic than right. \par - She is indicated at this time for left total knee arthroplasty with Yen robotic assistance and right knee CSI. \par - We discussed the details of the procedure, the expected recovery period, and the expected outcome. We discussed the likelihood of satisfaction after complete recovery, and the potential causes of dissatisfaction. The importance of active patient participation in the rehabilitation protocol was emphasized, along with its influence on short and long-term outcomes. Specific risks of total knee replacement were discussed in detail. We discussed the risk of surgical site complications including but not limited to: surgical site infection, wound healing complications, bone fracture, tendon or ligament injury, neurovascular injury, hemorrhage, postoperative stiffness or instability, persistent pain and need for reoperation or manipulation under anesthesia. We discussed surgical blood loss and the possible need for blood transfusion. We discussed the risk of perioperative medical complications, including but not limited to catheter-associated urinary tract infection, venous thromboembolism and other cardiopulmonary complications. We discussed anesthetic options and the risk of anesthesia-related complications. We discussed implant fixation methods; my plan would be to use fully cemented fixation in this case. We discussed the variable need to resurface the patella; my plan would be to resurface in this case. We discussed the durability of prosthetic knees and limitations related to wear, osteolysis and loosening.  We discussed the role of the robot in helping to guide bone resections and measure alignment and soft tissue balance. All questions were answered the patient's satisfaction. The patient was given a copy of my preoperative packet with additional information about the procedure. I asked the patient to either call back or schedule a followup appointment for any additional questions or concerns regarding the procedure. \par - She will be booked for surgery at a convenient time with routine medical clearance. Per her request, we administered bilateral knee CSI today. Given this injection today, she will be a candidate for surgery on or after Nini 15, 2023.

## 2023-05-09 ENCOUNTER — APPOINTMENT (OUTPATIENT)
Dept: ORTHOPEDIC SURGERY | Facility: CLINIC | Age: 68
End: 2023-05-09
Payer: MEDICARE

## 2023-05-09 PROCEDURE — 99213 OFFICE O/P EST LOW 20 MIN: CPT

## 2023-05-16 NOTE — DISCUSSION/SUMMARY
[de-identified] : Diagnosis: L3/4 unstable spondylolisthesis without significant symptoms, healed L4/5 spinal fusion.\par \par Patient is doing quite well clinically.I would not intervene surgically at this time given her absence of symptoms. She should follow up in 1 year with a new Xray Full Spine AP/Lateral with Lumbar flexion/extension at that time. The patient was instructed to follow up sooner if there is any issue.

## 2023-05-16 NOTE — HISTORY OF PRESENT ILLNESS
[de-identified] : Follow up 05/09/2023: Ms. Lai is here for follow up. She reports that she is quite happy with her result. It has been approximately 2 years since her L4/5 lumbar fusion. She denies any significant back pain. She denies any radiating pain down her legs. She denies any numbness or tingling.\par \par Follow up 5/10/22: Patient presents for follow up 1 year s/p L4/5 ALIF and L4-5 laminectomy PSF instrumentation doing well. She reports resolution of her lower back and lower extremity radiating pain denies new neurologic symptoms. She is quite happy with her result to date. She does report new right buttock pain. She has a history of bilateral THR with Dr. Isidro Raygoza. \par \par Follow up 12/14/21: 6 months s/p L4/L5 ALIF; L4/L5 laminectomy, psf, doing well. Reports resolution of low back and lower extremity pain. Denies new neurologic symptoms. Very happy with result to date.

## 2023-05-16 NOTE — PHYSICAL EXAM
[de-identified] : General: patient is well developed, well nourished, in no acute \par distress, alert and oriented x 3. \par \par Mood and affect: normal\par \par Respiratory: no respiratory distress noted\par \par Alignment:The spine is well compensated in the coronal and sagittal plane. \par \par Skin: Well healed anterior and posterior incision\par \par Gait: The patient is able to toe walk and heel walk without difficulty. \par \par Palpation: no tenderness to palpation spine or paraspinal region\par \par Range of motion: Lumbar spine ROM is full\par \par Neurologic Exam:\par Motor: Manual Muscle testing in the lower extremities is 5 out of 5 in all muscle groups. There is no evidence of muscular atrophy in the lower extremities. Sensory: Sensation to light touch is grossly intact in the lower extremities\par \par Hip Exam: No pain with internal or external rotation of hips bilaterally\par \par Special tests: Straight leg raise test negative. Cross straight leg test negative.  [de-identified] : XR Lumbar 4-View 05/09/2023 [my read]: L4/5 ALIF with posterior instrumentation. This area appears fused. L3/4 demonstrates grade 1 spondylolisthesis, this does appear to be mildly increased since her last images last year. There does appear to be instability on flexion/extension. The patient is reduced in the L3/4 position and has significant listhesis in the flexion position.\par \par XR  Lumbar AP/Lateral, flexion/extension 05/10/2022 (my read): Hardware intact, stable alignment. Appears to be fused anteriorly and posteriorly. Stable Grade 1 spondylolisthesis of L3/4. L5/S1 stable severe degenerative disc disease.\par \par XR 12/14/21: Hardware intact, stable alignment, unchanged from intraop.  Appears to be fused posteriorly bilaterally.  appears to have anterior fusion developing additionally.

## 2023-05-16 NOTE — END OF VISIT
[FreeTextEntry3] : All medical record entries made by the Scribe were at my, Dr. Jose Velazco, direction and personally dictated by me on 05/09/2023. I have reviewed the chart and agree that the record accurately reflects my personal performance of the history, physical exam, assessment and plan. I have also personally directed, reviewed, and agreed with the chart.

## 2023-05-16 NOTE — ADDENDUM
[FreeTextEntry1] : Documented by Gabby Schroeder acting as a scribe for Dr. Jose Velazco on 05/16/2023.

## 2023-06-21 ENCOUNTER — TRANSCRIPTION ENCOUNTER (OUTPATIENT)
Age: 68
End: 2023-06-21

## 2023-06-21 RX ORDER — TRIAMTERENE/HYDROCHLOROTHIAZID 75 MG-50MG
1 TABLET ORAL
Qty: 0 | Refills: 0 | DISCHARGE

## 2023-06-21 RX ORDER — POVIDONE-IODINE 5 %
1 AEROSOL (ML) TOPICAL ONCE
Refills: 0 | Status: COMPLETED | OUTPATIENT
Start: 2023-06-22 | End: 2023-06-22

## 2023-06-21 RX ORDER — CELECOXIB 200 MG/1
400 CAPSULE ORAL ONCE
Refills: 0 | Status: DISCONTINUED | OUTPATIENT
Start: 2023-06-22 | End: 2023-06-25

## 2023-06-21 NOTE — ASU PATIENT PROFILE, ADULT - FALL HARM RISK - UNIVERSAL INTERVENTIONS
Bed in lowest position, wheels locked, appropriate side rails in place/Call bell, personal items and telephone in reach/Instruct patient to call for assistance before getting out of bed or chair/Non-slip footwear when patient is out of bed/[place] to call system/Physically safe environment - no spills, clutter or unnecessary equipment/Purposeful Proactive Rounding/Room/bathroom lighting operational, light cord in reach

## 2023-06-21 NOTE — H&P ADULT - NSHPLABSRESULTS_GEN_ALL_CORE
Preop CBC, BMP, PT/PTT/INR, UA - WNL per medical clearance   + UA - negative culture  Cr .89  A1C 5.1   Preop EKG - sinus rhythm  WNL per medical clearance  Preop CXR - WNL per medical clearance   Echo Feb 2023 EF 59%  3M DOS Preop CBC, BMP, PT/PTT/INR, UA - WNL per medical clearance   + UA - negative culture  Cr .89  A1C 5.1   Preop EKG - sinus rhythm  WNL per medical clearance  Preop CXR - WNL per medical clearance   Echo Feb 2023 EF 59%  Povidone iodine nasal swab to be given day of surgery

## 2023-06-21 NOTE — H&P ADULT - HISTORY OF PRESENT ILLNESS
67F c/o bilateral knee pain x     Present for elective left total knee replacement, robotic assisted, right knee corticosteroid injection  67F c/o bilateral knee pain despite conservative therapies for her symptoms. Denies history of DVT.   Present for elective left total knee replacement, robotic assisted, right knee corticosteroid injection.

## 2023-06-21 NOTE — H&P ADULT - NSHPPHYSICALEXAM_GEN_ALL_CORE
MSK: + decreased ROM 2/2 pain, bilateral knees    Remainder of exam per medical clearance note MSK:  Skin warm and well perfused. DP pulses palpable bilateral lower extremities. Sensation intact and equal bilateral lower extremities. EHL/TA/GS/FHL 5/5 bilateral lower extremities. + decreased ROM 2/2 pain, bilateral knees    Remainder of exam per medical clearance note

## 2023-06-21 NOTE — H&P ADULT - NSICDXPASTSURGICALHX_GEN_ALL_CORE_FT
PAST SURGICAL HISTORY:  H/O breast surgery bilateral lift    History of hip replacement, total, right 2016 right , left 2018

## 2023-06-21 NOTE — H&P ADULT - PROBLEM SELECTOR PLAN 1
Admit to Orthopaedic Service.  Presents today for elective left total knee arthroplasty, right knee corticosteroid injection  Pt medically stable and cleared for procedure today by Dr. Vásquez and Dr. Hutson

## 2023-06-21 NOTE — H&P ADULT - NSICDXPASTMEDICALHX_GEN_ALL_CORE_FT
PAST MEDICAL HISTORY:  Asthma childhood    HLD (hyperlipidemia)     Hypertension     Obesity     Osteoarthritis of hips, bilateral     Osteoarthritis of knees, bilateral

## 2023-06-22 ENCOUNTER — INPATIENT (INPATIENT)
Facility: HOSPITAL | Age: 68
LOS: 2 days | Discharge: HOME HEALTH | DRG: 470 | End: 2023-06-25
Attending: ORTHOPAEDIC SURGERY | Admitting: ORTHOPAEDIC SURGERY
Payer: MEDICARE

## 2023-06-22 ENCOUNTER — APPOINTMENT (OUTPATIENT)
Dept: ORTHOPEDIC SURGERY | Facility: HOSPITAL | Age: 68
End: 2023-06-22

## 2023-06-22 ENCOUNTER — TRANSCRIPTION ENCOUNTER (OUTPATIENT)
Age: 68
End: 2023-06-22

## 2023-06-22 ENCOUNTER — RESULT REVIEW (OUTPATIENT)
Age: 68
End: 2023-06-22

## 2023-06-22 VITALS — OXYGEN SATURATION: 98 % | HEIGHT: 65 IN | WEIGHT: 220.46 LBS

## 2023-06-22 DIAGNOSIS — Z96.641 PRESENCE OF RIGHT ARTIFICIAL HIP JOINT: Chronic | ICD-10-CM

## 2023-06-22 DIAGNOSIS — Z98.89 OTHER SPECIFIED POSTPROCEDURAL STATES: Chronic | ICD-10-CM

## 2023-06-22 DIAGNOSIS — J45.909 UNSPECIFIED ASTHMA, UNCOMPLICATED: ICD-10-CM

## 2023-06-22 DIAGNOSIS — I10 ESSENTIAL (PRIMARY) HYPERTENSION: ICD-10-CM

## 2023-06-22 DIAGNOSIS — M17.0 BILATERAL PRIMARY OSTEOARTHRITIS OF KNEE: ICD-10-CM

## 2023-06-22 PROCEDURE — 27447 TOTAL KNEE ARTHROPLASTY: CPT | Mod: LT

## 2023-06-22 PROCEDURE — 73560 X-RAY EXAM OF KNEE 1 OR 2: CPT | Mod: 26,LT

## 2023-06-22 PROCEDURE — S2900 ROBOTIC SURGICAL SYSTEM: CPT | Mod: NC

## 2023-06-22 PROCEDURE — 20610 DRAIN/INJ JOINT/BURSA W/O US: CPT | Mod: 59,RT

## 2023-06-22 DEVICE — STEM EXT PERSONA 14MM PLUS 30M: Type: IMPLANTABLE DEVICE | Site: LEFT | Status: FUNCTIONAL

## 2023-06-22 DEVICE — IMPLANTABLE DEVICE: Type: IMPLANTABLE DEVICE | Site: LEFT | Status: FUNCTIONAL

## 2023-06-22 DEVICE — SURF ART PERSONA 6-9 EF LT 10MM: Type: IMPLANTABLE DEVICE | Site: LEFT | Status: FUNCTIONAL

## 2023-06-22 DEVICE — PIN FIX CAS 3.2X80MM STR: Type: IMPLANTABLE DEVICE | Site: LEFT | Status: FUNCTIONAL

## 2023-06-22 DEVICE — STEM TIB PSN SZ E L 5 DEG: Type: IMPLANTABLE DEVICE | Site: LEFT | Status: FUNCTIONAL

## 2023-06-22 DEVICE — ZIMMER/NEXGEN SMOOTH PIN 3.2X75MM: Type: IMPLANTABLE DEVICE | Site: LEFT | Status: FUNCTIONAL

## 2023-06-22 DEVICE — PIN CAS FIX 3.2X150MM: Type: IMPLANTABLE DEVICE | Site: LEFT | Status: FUNCTIONAL

## 2023-06-22 DEVICE — ZIMMER/NEXGEN HEX HEAD SCREW 3.5MM: Type: IMPLANTABLE DEVICE | Site: LEFT | Status: FUNCTIONAL

## 2023-06-22 DEVICE — CELLERATE SURGICAL POWDER RX 5GM: Type: IMPLANTABLE DEVICE | Site: LEFT | Status: FUNCTIONAL

## 2023-06-22 DEVICE — ZIMMER FEMALE HEX SCREW MAGNETIC 2.5MM X 25MM: Type: IMPLANTABLE DEVICE | Site: LEFT | Status: FUNCTIONAL

## 2023-06-22 RX ORDER — HYDROMORPHONE HYDROCHLORIDE 2 MG/ML
0.5 INJECTION INTRAMUSCULAR; INTRAVENOUS; SUBCUTANEOUS EVERY 4 HOURS
Refills: 0 | Status: COMPLETED | OUTPATIENT
Start: 2023-06-22 | End: 2023-06-29

## 2023-06-22 RX ORDER — SPIRONOLACTONE 25 MG/1
50 TABLET, FILM COATED ORAL DAILY
Refills: 0 | Status: DISCONTINUED | OUTPATIENT
Start: 2023-06-22 | End: 2023-06-25

## 2023-06-22 RX ORDER — APREPITANT 80 MG/1
40 CAPSULE ORAL ONCE
Refills: 0 | Status: COMPLETED | OUTPATIENT
Start: 2023-06-22 | End: 2023-06-22

## 2023-06-22 RX ORDER — ACETAMINOPHEN 500 MG
1000 TABLET ORAL ONCE
Refills: 0 | Status: COMPLETED | OUTPATIENT
Start: 2023-06-22 | End: 2023-06-22

## 2023-06-22 RX ORDER — ASPIRIN/CALCIUM CARB/MAGNESIUM 324 MG
81 TABLET ORAL
Refills: 0 | Status: DISCONTINUED | OUTPATIENT
Start: 2023-06-23 | End: 2023-06-25

## 2023-06-22 RX ORDER — PANTOPRAZOLE SODIUM 20 MG/1
40 TABLET, DELAYED RELEASE ORAL
Refills: 0 | Status: DISCONTINUED | OUTPATIENT
Start: 2023-06-22 | End: 2023-06-25

## 2023-06-22 RX ORDER — CHLORHEXIDINE GLUCONATE 213 G/1000ML
1 SOLUTION TOPICAL EVERY 12 HOURS
Refills: 0 | Status: COMPLETED | OUTPATIENT
Start: 2023-06-22 | End: 2023-06-22

## 2023-06-22 RX ORDER — HYDROMORPHONE HYDROCHLORIDE 2 MG/ML
0.5 INJECTION INTRAMUSCULAR; INTRAVENOUS; SUBCUTANEOUS
Refills: 0 | Status: COMPLETED | OUTPATIENT
Start: 2023-06-22

## 2023-06-22 RX ORDER — KETOROLAC TROMETHAMINE 30 MG/ML
15 SYRINGE (ML) INJECTION EVERY 6 HOURS
Refills: 0 | Status: DISCONTINUED | OUTPATIENT
Start: 2023-06-22 | End: 2023-06-23

## 2023-06-22 RX ORDER — ONDANSETRON 8 MG/1
4 TABLET, FILM COATED ORAL EVERY 6 HOURS
Refills: 0 | Status: DISCONTINUED | OUTPATIENT
Start: 2023-06-22 | End: 2023-06-25

## 2023-06-22 RX ORDER — CEFAZOLIN SODIUM 1 G
2000 VIAL (EA) INJECTION EVERY 8 HOURS
Refills: 0 | Status: COMPLETED | OUTPATIENT
Start: 2023-06-22 | End: 2023-06-23

## 2023-06-22 RX ORDER — CELECOXIB 200 MG/1
200 CAPSULE ORAL EVERY 12 HOURS
Refills: 0 | Status: DISCONTINUED | OUTPATIENT
Start: 2023-06-23 | End: 2023-06-25

## 2023-06-22 RX ORDER — MAGNESIUM HYDROXIDE 400 MG/1
30 TABLET, CHEWABLE ORAL DAILY
Refills: 0 | Status: DISCONTINUED | OUTPATIENT
Start: 2023-06-22 | End: 2023-06-25

## 2023-06-22 RX ORDER — OXYCODONE HYDROCHLORIDE 5 MG/1
5 TABLET ORAL EVERY 4 HOURS
Refills: 0 | Status: DISCONTINUED | OUTPATIENT
Start: 2023-06-22 | End: 2023-06-25

## 2023-06-22 RX ORDER — SENNA PLUS 8.6 MG/1
2 TABLET ORAL AT BEDTIME
Refills: 0 | Status: DISCONTINUED | OUTPATIENT
Start: 2023-06-22 | End: 2023-06-25

## 2023-06-22 RX ORDER — POLYETHYLENE GLYCOL 3350 17 G/17G
17 POWDER, FOR SOLUTION ORAL AT BEDTIME
Refills: 0 | Status: DISCONTINUED | OUTPATIENT
Start: 2023-06-22 | End: 2023-06-25

## 2023-06-22 RX ORDER — HYDROMORPHONE HYDROCHLORIDE 2 MG/ML
0.5 INJECTION INTRAMUSCULAR; INTRAVENOUS; SUBCUTANEOUS EVERY 4 HOURS
Refills: 0 | Status: DISCONTINUED | OUTPATIENT
Start: 2023-06-22 | End: 2023-06-25

## 2023-06-22 RX ORDER — TRAMADOL HYDROCHLORIDE 50 MG/1
50 TABLET ORAL EVERY 6 HOURS
Refills: 0 | Status: DISCONTINUED | OUTPATIENT
Start: 2023-06-22 | End: 2023-06-25

## 2023-06-22 RX ORDER — OXYCODONE HYDROCHLORIDE 5 MG/1
10 TABLET ORAL EVERY 4 HOURS
Refills: 0 | Status: DISCONTINUED | OUTPATIENT
Start: 2023-06-22 | End: 2023-06-25

## 2023-06-22 RX ORDER — SODIUM CHLORIDE 9 MG/ML
1000 INJECTION, SOLUTION INTRAVENOUS
Refills: 0 | Status: DISCONTINUED | OUTPATIENT
Start: 2023-06-23 | End: 2023-06-25

## 2023-06-22 RX ORDER — HYDROMORPHONE HYDROCHLORIDE 2 MG/ML
0.5 INJECTION INTRAMUSCULAR; INTRAVENOUS; SUBCUTANEOUS
Refills: 0 | Status: DISCONTINUED | OUTPATIENT
Start: 2023-06-22 | End: 2023-06-25

## 2023-06-22 RX ORDER — ACETAMINOPHEN 500 MG
1000 TABLET ORAL EVERY 8 HOURS
Refills: 0 | Status: DISCONTINUED | OUTPATIENT
Start: 2023-06-22 | End: 2023-06-25

## 2023-06-22 RX ADMIN — CHLORHEXIDINE GLUCONATE 1 APPLICATION(S): 213 SOLUTION TOPICAL at 10:14

## 2023-06-22 RX ADMIN — APREPITANT 40 MILLIGRAM(S): 80 CAPSULE ORAL at 10:10

## 2023-06-22 RX ADMIN — HYDROMORPHONE HYDROCHLORIDE 0.5 MILLIGRAM(S): 2 INJECTION INTRAMUSCULAR; INTRAVENOUS; SUBCUTANEOUS at 18:20

## 2023-06-22 RX ADMIN — Medication 1000 MILLIGRAM(S): at 21:35

## 2023-06-22 RX ADMIN — Medication 15 MILLIGRAM(S): at 23:28

## 2023-06-22 RX ADMIN — Medication 15 MILLIGRAM(S): at 18:01

## 2023-06-22 RX ADMIN — Medication 1 APPLICATION(S): at 10:14

## 2023-06-22 RX ADMIN — HYDROMORPHONE HYDROCHLORIDE 0.5 MILLIGRAM(S): 2 INJECTION INTRAMUSCULAR; INTRAVENOUS; SUBCUTANEOUS at 18:05

## 2023-06-22 RX ADMIN — Medication 1000 MILLIGRAM(S): at 10:10

## 2023-06-22 RX ADMIN — Medication 100 MILLIGRAM(S): at 19:19

## 2023-06-22 RX ADMIN — Medication 15 MILLIGRAM(S): at 18:18

## 2023-06-22 RX ADMIN — Medication 1000 MILLIGRAM(S): at 22:35

## 2023-06-22 NOTE — PRE-ANESTHESIA EVALUATION ADULT - NSANTHINDVINFOSD_GEN_ALL_CORE
spinal anesthesia, left adductor canal block, left ipack block/patient left adductor canal block, left ipack block/patient

## 2023-06-22 NOTE — PRE-ANESTHESIA EVALUATION ADULT - NSANTHADDINFOFT_GEN_ALL_CORE
Medical evaluation reviewed. Medical evaluation reviewed.  HISTORY OF LUMBAR SPINAL FUSION. NOT A CANDIDATE FOR SPINAL ANESTHESIA.

## 2023-06-22 NOTE — DISCHARGE NOTE PROVIDER - HOSPITAL COURSE
Admit to Orthopaedics  Perioperative Antibiotics  DVT prophylaxis  Physical Therapy  OOB/ WBAT  Pain Management    Admit to Orthopaedics 6/22/23 for left total knee replacement, right knee corticosteroid injection   Perioperative Antibiotics  DVT prophylaxis  Physical Therapy  OOB/ WBAT  Pain Management

## 2023-06-22 NOTE — DISCHARGE NOTE PROVIDER - NSDCFUSCHEDAPPT_GEN_ALL_CORE_FT
Mateo Mitchell  [facility] Physician [facility]  ORTHOSURG [address]  Scheduled Appointment: 07/07/2023

## 2023-06-22 NOTE — PRE-ANESTHESIA EVALUATION ADULT - NSANTHOSAYNRD_GEN_A_CORE
No. BAM screening performed.  STOP BANG Legend: 0-2 = LOW Risk; 3-4 = INTERMEDIATE Risk; 5-8 = HIGH Risk

## 2023-06-22 NOTE — BRIEF OPERATIVE NOTE - NSICDXBRIEFPROCEDURE_GEN_ALL_CORE_FT
PROCEDURES:  Left total knee arthroplasty 22-Jun-2023 11:32:50  Maddy Chiang   PROCEDURES:  Left total knee arthroplasty 22-Jun-2023 11:32:50  Maddy Chiang  Injection of steroid into right knee 22-Jun-2023 14:40:03  Maddy Chiang

## 2023-06-22 NOTE — PRE-ANESTHESIA EVALUATION ADULT - NSRADCARDRESULTSFT_GEN_ALL_CORE
Preop EKG - sinus rhythm  WNL per medical clearance  Preop CXR - WNL per medical clearance   Echo Feb 2023 EF 59%

## 2023-06-22 NOTE — DISCHARGE NOTE PROVIDER - CARE PROVIDER_API CALL
Mateo Mitchell  Orthopaedic Surgery  [address]  [place] [address]  Phone: [phone]  Fax: [phone]  Follow Up Time:

## 2023-06-22 NOTE — DISCHARGE NOTE PROVIDER - NSDCMRMEDTOKEN_GEN_ALL_CORE_FT
acetaminophen 500 mg oral tablet: 2 tab(s) orally every 8 hours MDD:6  hydroCHLOROthiazide 25 mg oral tablet: 1 orally  spironolactone 50 mg oral tablet: 1 orally  vitamin c: 500 milligram(s) orally once a day   acetaminophen 500 mg oral tablet: 2 tab(s) orally every 8 hours  aspirin 81 mg oral delayed release tablet: 1 tab(s) orally every 12 hours for 30 days after surgery for DVT ppx  celecoxib 200 mg oral capsule: 1 cap(s) orally every 12 hours  hydroCHLOROthiazide 25 mg oral tablet: 1 tablet orally once a day . Continue to take your blood pressure at home. If your blood pressure is less than 130 do not take this medication.  oxyCODONE 5 mg oral tablet: 1 tab(s) orally every 4 hours as needed for  severe pain . Can take 2 tablets if pain is not well-controlled by 1 tablet  pantoprazole 40 mg oral delayed release tablet: 1 tab(s) orally once a day (before a meal)  spironolactone 50 mg oral tablet: 1 tablet orally once a day . Continue to take your blood pressure at home and follow-up with your primary care provider.  vitamin c: 500 milligram(s) orally once a day   acetaminophen 500 mg oral tablet: 2 tab(s) orally every 8 hours  aspirin 81 mg oral delayed release tablet: 1 tab(s) orally every 12 hours for 30 days after surgery for DVT ppx  celecoxib 200 mg oral capsule: 1 cap(s) orally every 12 hours  hydroCHLOROthiazide 25 mg oral tablet: 1 tablet orally once a day . Continue to take your blood pressure at home. If your blood pressure is less than 130 do not take this medication.  morphine 15 mg/12 hr oral tablet, extended release: 1 tab(s) orally every 12 hours as needed for extended pain relief *SENT TO [facility] BY DR. KELLER. DO NOT TAKE IF PAIN WELL-CONTROLLED WITH ACETAMINOPHEN, CELEBREX, AND OXYCODONE. WEAN OFF AS TOLERATED.*  oxyCODONE 5 mg oral tablet: 1 tab(s) orally every 4 hours as needed for  severe pain . Can take 2 tablets if pain is not well-controlled by 1 tablet  pantoprazole 40 mg oral delayed release tablet: 1 tab(s) orally once a day (before a meal)  spironolactone 50 mg oral tablet: 1 tablet orally once a day . Continue to take your blood pressure at home and follow-up with your primary care provider.  vitamin c: 500 milligram(s) orally once a day

## 2023-06-22 NOTE — DISCHARGE NOTE PROVIDER - NSDCCPTREATMENT_GEN_ALL_CORE_FT
PRINCIPAL PROCEDURE  Procedure: Left total knee arthroplasty  Findings and Treatment: left knee osteoarthritis

## 2023-06-22 NOTE — DISCHARGE NOTE PROVIDER - NSDCCPCAREPLAN_GEN_ALL_CORE_FT
PRINCIPAL DISCHARGE DIAGNOSIS  Diagnosis: Osteoarthritis of knees, bilateral  Assessment and Plan of Treatment: left total knee arthroplasty and right knee corticosteroid injection

## 2023-06-22 NOTE — BRIEF OPERATIVE NOTE - NSICDXBRIEFPOSTOP_GEN_ALL_CORE_FT
POST-OP DIAGNOSIS:  Osteoarthritis of left knee 22-Jun-2023 11:33:05  Maddy Chiang   POST-OP DIAGNOSIS:  Osteoarthritis of left knee 22-Jun-2023 11:33:05  Maddy Chiang  Osteoarthritis of right knee 22-Jun-2023 14:40:17  Maddy Chiang

## 2023-06-22 NOTE — DISCHARGE NOTE PROVIDER - NSDCFUADDINST_GEN_ALL_CORE_FT
**SEE DR. KELLER'S DISCHARGE INSTRUCTION SHEET**    ACTIVITY:  - Weightbear as tolerated with assistive device. No strenuous activity, heavy lifting, driving or returning to work until cleared by MD.  - You may experience postoperative swelling on the operative extremity. You may ice the surgery site for 20 minute intervals.   - If you have had a total knee replacement, do not place pillows or bolsters underneath the knee.      KNEE REPLACEMENTS   DO place a pillow under your heel when elevating the leg, to encourage full extension of the knee. Do NOT place a pillow behind your knee for comfort, as this can lead to permanent difficulty straightening your leg. It is normal to develop some swelling in the leg, ankle, and foot because of gravity    DRESSING: ***  - Follow dressing/incision care instructions on Dr. Keller's discharge sheet.   - Keep your incision clean and dry. Do not pick at your incision. Do not apply creams, ointments or oils to your incision until cleared by your surgeon. Do not soak your incision in sitting water (ie tubs, pools, lakes, etc.) until cleared by your surgeon. You may let clean, running water fall over your incision.    MEDICATION/ANTICOAGULATION:  -You have been prescribed ***, as a preventative to help prevent postoperative blood clots. Please take this medication as prescribed.   - You have been prescribed medications for pain:    - Tylenol. 1,000mg every 8 hours. Do not exceed 3,000mg daily. This medication is most effective taken on a routine schedule the first 1-2 weeks after surgery.     - For more severe pain, take Tylenol with the addition of narcotic pain medication. Take this medication as prescribed. This medication may cause drowsiness or dizziness. Do not operate machinery. This medication may cause constipation.  - For all other medications, follow instructions on medication bottle or refer to Dr. Keller's discharge instruction sheet.   -Try to have regular bowel movements. Take stool softener or laxative if necessary. You may wish to take Miralax daily until you have regular bowel movements.   - If you have been prescribed Aspirin or an anti-inflammatory, please take Prilosec once a day, before breakfast, until no longer taking Aspirin or anti-inflammatory. This will help protect your stomach.  - If you have a pain management physician, please follow-up with them postoperatively.   - If you experience any negative side effects of your medications, please call your surgeon's office to discuss.    Follow-up:  - Call to schedule an appt with Dr. Keller for follow up. If you have staples or sutures they will be removed in office.  - Please follow-up with your primary care physician or any other specialist you see postoperatively, if needed.   - Contact your doctor if you experience: fever greater than 101.5, chills, chest pain, difficulty breathing, redness or excessive drainage around the incision, other concerns.  **SEE DR. KELLER'S DISCHARGE INSTRUCTION SHEET**    ACTIVITY:  - Weightbear as tolerated with assistive device. No strenuous activity, heavy lifting, driving or returning to work until cleared by MD.  - You may experience postoperative swelling on the operative extremity. You may ice the surgery site for 20 minute intervals.   - If you have had a total knee replacement, do not place pillows or bolsters underneath the knee.      KNEE REPLACEMENTS   DO place a pillow under your heel when elevating the leg, to encourage full extension of the knee. Do NOT place a pillow behind your knee for comfort, as this can lead to permanent difficulty straightening your leg. It is normal to develop some swelling in the leg, ankle, and foot because of gravity    DRESSING:   - Follow dressing/incision care instructions on Dr. Keller's discharge sheet.   - Keep your incision clean and dry. Do not pick at your incision. Do not apply creams, ointments or oils to your incision until cleared by your surgeon. Do not soak your incision in sitting water (ie tubs, pools, lakes, etc.) until cleared by your surgeon. You may let clean, running water fall over your incision.  You may take showers. Keep the battery pack dry. You may disconnect the dressing from the battery pack prior to showers and keep away from water. To do this, hold the on/off button down until it turns off, close the clamp on the tubing, then disconnect the tubing from the battery pack. Do the reverse to turn it back on.  No soaking in bathtubs.  The dressing has a battery that usually dies in 7 days. Once this occurs, you may remove the dressing and dispose of it, then leave incision open to air. Keep incision clean and dry.   MEDICATION/ANTICOAGULATION:  -You have been prescribed Aspirin 81 twice daily as a preventative to help prevent postoperative blood clots. Please take this medication as prescribed.   - You have been prescribed medications for pain:    - Tylenol. 1,000mg every 8 hours. Do not exceed 3,000mg daily. This medication is most effective taken on a routine schedule the first 1-2 weeks after surgery.     - For more severe pain, take Tylenol with the addition of narcotic pain medication. Take this medication as prescribed. This medication may cause drowsiness or dizziness. Do not operate machinery. This medication may cause constipation.  - For all other medications, follow instructions on medication bottle or refer to Dr. Keller's discharge instruction sheet.   -Try to have regular bowel movements. Take stool softener or laxative if necessary. You may wish to take Miralax daily until you have regular bowel movements.   - If you have been prescribed Aspirin or an anti-inflammatory, please take Prilosec once a day, before breakfast, until no longer taking Aspirin or anti-inflammatory. This will help protect your stomach.  - If you have a pain management physician, please follow-up with them postoperatively.   - If you experience any negative side effects of your medications, please call your surgeon's office to discuss.    Follow-up:  - Call to schedule an appt with Dr. Keller for follow up. If you have staples or sutures they will be removed in office.  - Please follow-up with your primary care physician or any other specialist you see postoperatively, if needed.   - Contact your doctor if you experience: fever greater than 101.5, chills, chest pain, difficulty breathing, redness or excessive drainage around the incision, other concerns.  **SEE DR. KELLER'S DISCHARGE INSTRUCTION SHEET. YOUR MEDICATIONS WERE SENT TO [facility], LOCATED ON THE FIRST FLOOR OF [facility]. tAKE MEDICATIONS AS PRESCRIBED.**    ACTIVITY:  - Weightbear as tolerated with assistive device. No strenuous activity, heavy lifting, driving or returning to work until cleared by MD.  - You may experience postoperative swelling on the operative extremity. You may ice the surgery site for 20 minute intervals.   - If you have had a total knee replacement, do not place pillows or bolsters underneath the knee.      KNEE REPLACEMENTS   DO place a pillow under your heel when elevating the leg, to encourage full extension of the knee. Do NOT place a pillow behind your knee for comfort, as this can lead to permanent difficulty straightening your leg. It is normal to develop some swelling in the leg, ankle, and foot because of gravity    DRESSING:   - Follow dressing/incision care instructions on Dr. Keller's discharge sheet. You have a prevena dressing. You may shower. Do not get the battery pack wet. Dressing can be removed when the battery dies (usually in ~7 days) and left open to air.  - Keep your incision clean and dry. Do not pick at your incision. Do not apply creams, ointments or oils to your incision until cleared by your surgeon. Do not soak your incision in sitting water (ie tubs, pools, lakes, etc.) until cleared by your surgeon. You may let clean, running water fall over your incision.  You may take showers. Keep the battery pack dry. You may disconnect the dressing from the battery pack prior to showers and keep away from water. To do this, hold the on/off button down until it turns off, close the clamp on the tubing, then disconnect the tubing from the battery pack. Do the reverse to turn it back on.  No soaking in bathtubs.  The dressing has a battery that usually dies in 7 days. Once this occurs, you may remove the dressing and dispose of it, then leave incision open to air. Keep incision clean and dry.   MEDICATION/ANTICOAGULATION:  -You have been prescribed Aspirin 81 twice daily as a preventative to help prevent postoperative blood clots. Please take this medication as prescribed.   - You have been prescribed medications for pain:    - Tylenol. 1,000mg every 8 hours. Do not exceed 3,000mg daily. This medication is most effective taken on a routine schedule the first 1-2 weeks after surgery.     - For more severe pain, take Tylenol with the addition of narcotic pain medication. Take this medication as prescribed. This medication may cause drowsiness or dizziness. Do not operate machinery. This medication may cause constipation.  - For all other medications, follow instructions on medication bottle or refer to Dr. Keller's discharge instruction sheet.   -Try to have regular bowel movements. Take stool softener or laxative if necessary. You may wish to take Miralax daily until you have regular bowel movements.   - If you have been prescribed Aspirin or an anti-inflammatory, please take Prilosec once a day, before breakfast, until no longer taking Aspirin or anti-inflammatory. This will help protect your stomach.  - If you have a pain management physician, please follow-up with them postoperatively.   - If you experience any negative side effects of your medications, please call your surgeon's office to discuss.    Follow-up:  - Call to schedule an appt with Dr. Keller for follow up. If you have staples or sutures they will be removed in office.  - Please follow-up with your primary care physician or any other specialist you see postoperatively, if needed.   - Contact your doctor if you experience: fever greater than 101.5, chills, chest pain, difficulty breathing, redness or excessive drainage around the incision, other concerns.

## 2023-06-22 NOTE — BRIEF OPERATIVE NOTE - NSICDXBRIEFPREOP_GEN_ALL_CORE_FT
PRE-OP DIAGNOSIS:  Osteoarthritis of left knee 22-Jun-2023 11:33:01  Maddy Chiang   PRE-OP DIAGNOSIS:  Osteoarthritis of left knee 22-Jun-2023 11:33:01  Maddy Chiang  Osteoarthritis of right knee 22-Jun-2023 14:40:12  Maddy Chiang

## 2023-06-23 LAB
ANION GAP SERPL CALC-SCNC: 11 MMOL/L — SIGNIFICANT CHANGE UP (ref 5–17)
BUN SERPL-MCNC: 24 MG/DL — HIGH (ref 7–23)
CALCIUM SERPL-MCNC: 8.4 MG/DL — SIGNIFICANT CHANGE UP (ref 8.4–10.5)
CHLORIDE SERPL-SCNC: 103 MMOL/L — SIGNIFICANT CHANGE UP (ref 96–108)
CO2 SERPL-SCNC: 25 MMOL/L — SIGNIFICANT CHANGE UP (ref 22–31)
CREAT SERPL-MCNC: 0.95 MG/DL — SIGNIFICANT CHANGE UP (ref 0.5–1.3)
EGFR: 66 ML/MIN/1.73M2 — SIGNIFICANT CHANGE UP
GLUCOSE SERPL-MCNC: 148 MG/DL — HIGH (ref 70–99)
HCT VFR BLD CALC: 35.4 % — SIGNIFICANT CHANGE UP (ref 34.5–45)
HGB BLD-MCNC: 11.3 G/DL — LOW (ref 11.5–15.5)
MCHC RBC-ENTMCNC: 30.8 PG — SIGNIFICANT CHANGE UP (ref 27–34)
MCHC RBC-ENTMCNC: 31.9 GM/DL — LOW (ref 32–36)
MCV RBC AUTO: 96.5 FL — SIGNIFICANT CHANGE UP (ref 80–100)
NRBC # BLD: 0 /100 WBCS — SIGNIFICANT CHANGE UP (ref 0–0)
PLATELET # BLD AUTO: 226 K/UL — SIGNIFICANT CHANGE UP (ref 150–400)
POTASSIUM SERPL-MCNC: 4.5 MMOL/L — SIGNIFICANT CHANGE UP (ref 3.5–5.3)
POTASSIUM SERPL-SCNC: 4.5 MMOL/L — SIGNIFICANT CHANGE UP (ref 3.5–5.3)
RBC # BLD: 3.67 M/UL — LOW (ref 3.8–5.2)
RBC # FLD: 13.6 % — SIGNIFICANT CHANGE UP (ref 10.3–14.5)
SODIUM SERPL-SCNC: 139 MMOL/L — SIGNIFICANT CHANGE UP (ref 135–145)
WBC # BLD: 15.37 K/UL — HIGH (ref 3.8–10.5)
WBC # FLD AUTO: 15.37 K/UL — HIGH (ref 3.8–10.5)

## 2023-06-23 PROCEDURE — 71045 X-RAY EXAM CHEST 1 VIEW: CPT | Mod: 26

## 2023-06-23 PROCEDURE — 99223 1ST HOSP IP/OBS HIGH 75: CPT

## 2023-06-23 RX ORDER — ACETAMINOPHEN 500 MG
2 TABLET ORAL
Qty: 0 | Refills: 0 | DISCHARGE
Start: 2023-06-23

## 2023-06-23 RX ORDER — CELECOXIB 200 MG/1
1 CAPSULE ORAL
Qty: 0 | Refills: 0 | DISCHARGE
Start: 2023-06-23

## 2023-06-23 RX ORDER — PANTOPRAZOLE SODIUM 20 MG/1
1 TABLET, DELAYED RELEASE ORAL
Qty: 0 | Refills: 0 | DISCHARGE
Start: 2023-06-23

## 2023-06-23 RX ORDER — ALBUTEROL 90 UG/1
2.5 AEROSOL, METERED ORAL EVERY 6 HOURS
Refills: 0 | Status: DISCONTINUED | OUTPATIENT
Start: 2023-06-23 | End: 2023-06-25

## 2023-06-23 RX ORDER — ASPIRIN/CALCIUM CARB/MAGNESIUM 324 MG
1 TABLET ORAL
Qty: 0 | Refills: 0 | DISCHARGE
Start: 2023-06-23

## 2023-06-23 RX ORDER — OXYCODONE HYDROCHLORIDE 5 MG/1
1 TABLET ORAL
Qty: 0 | Refills: 0 | DISCHARGE
Start: 2023-06-23

## 2023-06-23 RX ADMIN — Medication 1000 MILLIGRAM(S): at 14:49

## 2023-06-23 RX ADMIN — Medication 1000 MILLIGRAM(S): at 06:48

## 2023-06-23 RX ADMIN — SENNA PLUS 2 TABLET(S): 8.6 TABLET ORAL at 22:06

## 2023-06-23 RX ADMIN — Medication 15 MILLIGRAM(S): at 12:06

## 2023-06-23 RX ADMIN — Medication 15 MILLIGRAM(S): at 00:26

## 2023-06-23 RX ADMIN — Medication 81 MILLIGRAM(S): at 05:48

## 2023-06-23 RX ADMIN — POLYETHYLENE GLYCOL 3350 17 GRAM(S): 17 POWDER, FOR SOLUTION ORAL at 22:07

## 2023-06-23 RX ADMIN — CELECOXIB 200 MILLIGRAM(S): 200 CAPSULE ORAL at 05:48

## 2023-06-23 RX ADMIN — Medication 1000 MILLIGRAM(S): at 05:48

## 2023-06-23 RX ADMIN — Medication 15 MILLIGRAM(S): at 06:48

## 2023-06-23 RX ADMIN — CELECOXIB 200 MILLIGRAM(S): 200 CAPSULE ORAL at 17:38

## 2023-06-23 RX ADMIN — Medication 1000 MILLIGRAM(S): at 22:07

## 2023-06-23 RX ADMIN — Medication 81 MILLIGRAM(S): at 17:39

## 2023-06-23 RX ADMIN — Medication 100 MILLIGRAM(S): at 03:13

## 2023-06-23 RX ADMIN — Medication 15 MILLIGRAM(S): at 05:48

## 2023-06-23 RX ADMIN — PANTOPRAZOLE SODIUM 40 MILLIGRAM(S): 20 TABLET, DELAYED RELEASE ORAL at 05:48

## 2023-06-23 RX ADMIN — CELECOXIB 200 MILLIGRAM(S): 200 CAPSULE ORAL at 06:48

## 2023-06-23 RX ADMIN — Medication 1000 MILLIGRAM(S): at 23:00

## 2023-06-23 NOTE — CONSULT NOTE ADULT - SUBJECTIVE AND OBJECTIVE BOX
67F w/pmhx of HTN , Asthma, HLD, Osteoarthritis b/l  p/w bilateral knee pain despite conservative therapies presented for elective left total knee replacement, robotic assisted, right knee corticosteroid injection.     PAST MEDICAL & SURGICAL HISTORY:  Asthma childhood  Hypertension  Obesity  Osteoarthritis of hips, bilateral  Hyperlipidemia  Osteoarthritis of knees, bilateral  H/O breast surgery b/l lift  History of hip replacement, total, right  2016 right , left 2018    Home Medications:  acetaminophen 500 mg oral tablet: 2 tab(s) orally every 8 hours (23 Jun 2023 11:17)  aspirin 81 mg oral delayed release tablet: 1 tab(s) orally every 12 hours for 30 days after surgery for DVT ppx (23 Jun 2023 11:17)  celecoxib 200 mg oral capsule: 1 cap(s) orally every 12 hours (23 Jun 2023 11:17)  hydroCHLOROthiazide 25 mg oral tablet: 1 tablet orally once a day . Continue to take your blood pressure at home. If your blood pressure is less than 130 do not take this medication. (23 Jun 2023 11:17)  oxyCODONE 5 mg oral tablet: 1 tab(s) orally every 4 hours as needed for  severe pain . Can take 2 tablets if pain is not well-controlled by 1 tablet (23 Jun 2023 11:17)  pantoprazole 40 mg oral delayed release tablet: 1 tab(s) orally once a day (before a meal) (23 Jun 2023 11:17)  spironolactone 50 mg oral tablet: 1 tablet orally once a day . Continue to take your blood pressure at home and follow-up with your primary care provider. (23 Jun 2023 11:17)  vitamin c: 500 milligram(s) orally once a day (22 Jun 2023 09:50)    Allergies: penicillin (Rash)  latex (Rash)    VITAL SIGNS, INS/OUTS (last 24 hours):  Vital Signs Last 24 Hrs  T(C): 36.6 (23 Jun 2023 08:52), Max: 36.6 (22 Jun 2023 20:33)  T(F): 97.8 (23 Jun 2023 08:52), Max: 97.9 (23 Jun 2023 05:18)  HR: 83 (23 Jun 2023 10:10) (80 - 96)  BP: 96/61 (23 Jun 2023 10:10) (91/60 - 153/77)  BP(mean): 91 (22 Jun 2023 19:15) (89 - 111)  RR: 17 (23 Jun 2023 08:52) (8 - 19)  SpO2: 94% (23 Jun 2023 08:52) (94% - 99%)    PHYSICAL EXAM:  NAD laying in bed  CTA b/l no wheezing or crackles  NL S1,S2 no mumurs   soft NT/ND + BS no rebound or guarding                           11.3   15.37 )-----------( 226      ( 23 Jun 2023 07:58 )             35.4     139  |  103  |  24<H>  ----------------------------<  148<H>  4.5   |  25  |  0.95    Ca    8.4      23 Jun 2023 07:58    Urinalysis Basic - ( 23 Jun 2023 07:58 )  Color: x / Appearance: x / SG: x / pH: x  Gluc: 148 mg/dL / Ketone: x  / Bili: x / Urobili: x   Blood: x / Protein: x / Nitrite: x   Leuk Esterase: x / RBC: x / WBC x   Sq Epi: x / Non Sq Epi: x / Bacteria: x      CURRENT MEDICATIONS:   acetaminophen     Tablet .. 1000 milliGRAM(s) Oral every 8 hours  aspirin 81 milliGRAM(s) Oral two times a day  celecoxib 400 milliGRAM(s) Oral once  celecoxib 200 milliGRAM(s) Oral every 12 hours  hydrochlorothiazide 25 milliGRAM(s) Oral daily  HYDROmorphone  Injectable 0.5 milliGRAM(s) IV Push every 15 minutes  HYDROmorphone  Injectable 0.5 milliGRAM(s) IV Push every 4 hours PRN  ketorolac   Injectable 15 milliGRAM(s) IV Push every 6 hours  lactated ringers. 1000 milliLiter(s) IV Continuous <Continuous>  magnesium hydroxide Suspension 30 milliLiter(s) Oral daily PRN  ondansetron Injectable 4 milliGRAM(s) IV Push every 6 hours PRN  oxyCODONE    IR 5 milliGRAM(s) Oral every 4 hours PRN  oxyCODONE    IR 10 milliGRAM(s) Oral every 4 hours PRN  pantoprazole    Tablet 40 milliGRAM(s) Oral before breakfast  polyethylene glycol 3350 17 Gram(s) Oral at bedtime  senna 2 Tablet(s) Oral at bedtime  spironolactone 50 milliGRAM(s) Oral daily  traMADol 50 milliGRAM(s) Oral every 6 hours PRN

## 2023-06-23 NOTE — PHYSICAL THERAPY INITIAL EVALUATION ADULT - GENERAL OBSERVATIONS, REHAB EVAL
As per NICOEL Hastings patient cleared for PT/OOB. Received supine + heplock, SCDs, cryocuff left knee, in NAD

## 2023-06-23 NOTE — PHYSICAL THERAPY INITIAL EVALUATION ADULT - ACTIVE RANGE OF MOTION EXAMINATION, REHAB EVAL
except left knee flexion 0-50 degrees/no Active ROM deficits were identified/deficits as listed below except left knee flexion 0-50 degrees and decreased left shoulder flexion/no Active ROM deficits were identified/deficits as listed below

## 2023-06-23 NOTE — PHYSICAL THERAPY INITIAL EVALUATION ADULT - AMBULATION SKILLS, REHAB EVAL
Problem: Malnutrition  (NI-5.2)  Goal: Food and/or Nutrient Delivery  Continue Current Diet and Continue Ensure High Calorie ONS BID. Description  Individualized approach for food/nutrient provision.   Outcome: Not Met This Shift independent

## 2023-06-23 NOTE — PHYSICAL THERAPY INITIAL EVALUATION ADULT - MANUAL MUSCLE TESTING RESULTS, REHAB EVAL
except left hip/knee NT due to surgery/no strength deficits were identified except left shoulder flexion 3-/5, left hip/knee NT due to surgery/no strength deficits were identified

## 2023-06-23 NOTE — CONSULT NOTE ADULT - ASSESSMENT
67F w/pmhx of HTN , Asthma, HLD, Osteoarthritis b/l p/w bilateral knee pain despite conservative therapies presented for elective left total knee replacement, robotic assisted, right knee corticosteroid injection.     #Knee pain s/p Left sided TKR  #Knee pain s/p right side corticosteroid injection  #Osteoarthritis b/l   - DVT prop, pain and bowel regime management as per Ortho    #Leukocytosis  -Likely secondary to surgery will continue to monitor for now     #Mild cough   -Likely secondary to throat irritation from anesthesia however if it continues then recommend check a CXR to evaluation for possible infection especially given elevated WBC   -May consider starting lozenges prn     #HTN/HLD  - Continue HCTZ and spirolactone     #Nausea  -Pt w/ nausea on 6/22 following anesthesia now improved   -Reglan prn     #DISPO  -As per Ortho  -Awaiting PT evaluation

## 2023-06-23 NOTE — PHYSICAL THERAPY INITIAL EVALUATION ADULT - ADDITIONAL COMMENTS
Patient lives alone in an elevator building with a ramp access. Denies use of AD. Has a cane at home

## 2023-06-24 LAB
ANION GAP SERPL CALC-SCNC: 8 MMOL/L — SIGNIFICANT CHANGE UP (ref 5–17)
BUN SERPL-MCNC: 24 MG/DL — HIGH (ref 7–23)
CALCIUM SERPL-MCNC: 8.2 MG/DL — LOW (ref 8.4–10.5)
CHLORIDE SERPL-SCNC: 109 MMOL/L — HIGH (ref 96–108)
CO2 SERPL-SCNC: 23 MMOL/L — SIGNIFICANT CHANGE UP (ref 22–31)
CREAT SERPL-MCNC: 0.72 MG/DL — SIGNIFICANT CHANGE UP (ref 0.5–1.3)
EGFR: 92 ML/MIN/1.73M2 — SIGNIFICANT CHANGE UP
GLUCOSE SERPL-MCNC: 94 MG/DL — SIGNIFICANT CHANGE UP (ref 70–99)
HCT VFR BLD CALC: 33.1 % — LOW (ref 34.5–45)
HGB BLD-MCNC: 10.7 G/DL — LOW (ref 11.5–15.5)
MCHC RBC-ENTMCNC: 31 PG — SIGNIFICANT CHANGE UP (ref 27–34)
MCHC RBC-ENTMCNC: 32.3 GM/DL — SIGNIFICANT CHANGE UP (ref 32–36)
MCV RBC AUTO: 95.9 FL — SIGNIFICANT CHANGE UP (ref 80–100)
NRBC # BLD: 0 /100 WBCS — SIGNIFICANT CHANGE UP (ref 0–0)
PLATELET # BLD AUTO: 152 K/UL — SIGNIFICANT CHANGE UP (ref 150–400)
POTASSIUM SERPL-MCNC: 4.1 MMOL/L — SIGNIFICANT CHANGE UP (ref 3.5–5.3)
POTASSIUM SERPL-SCNC: 4.1 MMOL/L — SIGNIFICANT CHANGE UP (ref 3.5–5.3)
RBC # BLD: 3.45 M/UL — LOW (ref 3.8–5.2)
RBC # FLD: 14.1 % — SIGNIFICANT CHANGE UP (ref 10.3–14.5)
SODIUM SERPL-SCNC: 140 MMOL/L — SIGNIFICANT CHANGE UP (ref 135–145)
WBC # BLD: 10.41 K/UL — SIGNIFICANT CHANGE UP (ref 3.8–10.5)
WBC # FLD AUTO: 10.41 K/UL — SIGNIFICANT CHANGE UP (ref 3.8–10.5)

## 2023-06-24 PROCEDURE — 99232 SBSQ HOSP IP/OBS MODERATE 35: CPT

## 2023-06-24 RX ORDER — LACTULOSE 10 G/15ML
20 SOLUTION ORAL ONCE
Refills: 0 | Status: COMPLETED | OUTPATIENT
Start: 2023-06-24 | End: 2023-06-24

## 2023-06-24 RX ORDER — BENZOCAINE AND MENTHOL 5; 1 G/100ML; G/100ML
1 LIQUID ORAL EVERY 8 HOURS
Refills: 0 | Status: DISCONTINUED | OUTPATIENT
Start: 2023-06-24 | End: 2023-06-25

## 2023-06-24 RX ADMIN — Medication 1000 MILLIGRAM(S): at 06:34

## 2023-06-24 RX ADMIN — TRAMADOL HYDROCHLORIDE 50 MILLIGRAM(S): 50 TABLET ORAL at 08:57

## 2023-06-24 RX ADMIN — TRAMADOL HYDROCHLORIDE 50 MILLIGRAM(S): 50 TABLET ORAL at 23:11

## 2023-06-24 RX ADMIN — Medication 1000 MILLIGRAM(S): at 05:34

## 2023-06-24 RX ADMIN — Medication 81 MILLIGRAM(S): at 05:34

## 2023-06-24 RX ADMIN — TRAMADOL HYDROCHLORIDE 50 MILLIGRAM(S): 50 TABLET ORAL at 09:57

## 2023-06-24 RX ADMIN — CELECOXIB 200 MILLIGRAM(S): 200 CAPSULE ORAL at 06:34

## 2023-06-24 RX ADMIN — TRAMADOL HYDROCHLORIDE 50 MILLIGRAM(S): 50 TABLET ORAL at 03:00

## 2023-06-24 RX ADMIN — TRAMADOL HYDROCHLORIDE 50 MILLIGRAM(S): 50 TABLET ORAL at 16:05

## 2023-06-24 RX ADMIN — TRAMADOL HYDROCHLORIDE 50 MILLIGRAM(S): 50 TABLET ORAL at 22:11

## 2023-06-24 RX ADMIN — Medication 1000 MILLIGRAM(S): at 22:12

## 2023-06-24 RX ADMIN — CELECOXIB 200 MILLIGRAM(S): 200 CAPSULE ORAL at 18:07

## 2023-06-24 RX ADMIN — MAGNESIUM HYDROXIDE 30 MILLILITER(S): 400 TABLET, CHEWABLE ORAL at 08:57

## 2023-06-24 RX ADMIN — Medication 81 MILLIGRAM(S): at 18:07

## 2023-06-24 RX ADMIN — PANTOPRAZOLE SODIUM 40 MILLIGRAM(S): 20 TABLET, DELAYED RELEASE ORAL at 05:34

## 2023-06-24 RX ADMIN — TRAMADOL HYDROCHLORIDE 50 MILLIGRAM(S): 50 TABLET ORAL at 17:05

## 2023-06-24 RX ADMIN — Medication 1000 MILLIGRAM(S): at 23:12

## 2023-06-24 RX ADMIN — TRAMADOL HYDROCHLORIDE 50 MILLIGRAM(S): 50 TABLET ORAL at 02:06

## 2023-06-24 RX ADMIN — CELECOXIB 200 MILLIGRAM(S): 200 CAPSULE ORAL at 05:35

## 2023-06-24 RX ADMIN — Medication 1000 MILLIGRAM(S): at 13:48

## 2023-06-24 NOTE — OCCUPATIONAL THERAPY INITIAL EVALUATION ADULT - DIAGNOSIS, OT EVAL
Pt is s/p L TKR (6/22) presents close to functional baseline, mild deficits in balance and activity tolerance 2/2 L knee pain impacting ease of performing ADLs and mobility at PLOF.

## 2023-06-24 NOTE — OCCUPATIONAL THERAPY INITIAL EVALUATION ADULT - MODALITIES TREATMENT COMMENTS
Pt provided with leg  to increase ease of bed mobility. Discussed with patient tub transfer techniques with pt demo good understanding. Also discussed with pt where to purchase long handled reacher and toilet riser.

## 2023-06-24 NOTE — OCCUPATIONAL THERAPY INITIAL EVALUATION ADULT - ADDITIONAL COMMENTS
Pt lives alone in an apartment, no ANTONIA, has elevator access. Prior to admit, pt reports being independent with all ADLs/IADLs and mobility, did not require any DME or ADs. Pt states she has a tub/shower with grab bar and also owns a raised toilet seat with handlebars but is looking to purchase a new one.

## 2023-06-24 NOTE — PROGRESS NOTE ADULT - ASSESSMENT
#Knee pain s/p Left sided TKR  #Knee pain s/p right side corticosteroid injection  #Osteoarthritis b/l   - DVT prop, pain and bowel regime management as per Ortho    #Leukocytosis, improving    #Mild cough, improved  -May consider starting lozenges prn     #HTN/HLD  - Continue HCTZ and spirolactone     #Nausea  -Pt w/ nausea on 6/22 following anesthesia now improved   -Reglan prn     #DISPO  home PT

## 2023-06-24 NOTE — OCCUPATIONAL THERAPY INITIAL EVALUATION ADULT - GENERAL OBSERVATIONS, REHAB EVAL
Pt received semi-supine in bed, family at bedside, +heplock, +L knee dressing C/D/I, +prevena, in NAD and agreeable to OT. Cleared by NICOLE Hastings to see.

## 2023-06-24 NOTE — OCCUPATIONAL THERAPY INITIAL EVALUATION ADULT - LUE MMT, REHAB EVAL
L shoulder flexion ~3+/5 (has arthritis at baseline), L elbow flex/ext ~4/5, L wrist flex/ext 4/5,  5/5

## 2023-06-24 NOTE — OCCUPATIONAL THERAPY INITIAL EVALUATION ADULT - PERTINENT HX OF CURRENT PROBLEM, REHAB EVAL
67F c/o bilateral knee pain despite conservative therapies for her symptoms. Denies history of DVT.   Present for elective left total knee replacement, robotic assisted, right knee corticosteroid injection.

## 2023-06-25 ENCOUNTER — TRANSCRIPTION ENCOUNTER (OUTPATIENT)
Age: 68
End: 2023-06-25

## 2023-06-25 VITALS
RESPIRATION RATE: 16 BRPM | DIASTOLIC BLOOD PRESSURE: 76 MMHG | TEMPERATURE: 98 F | OXYGEN SATURATION: 93 % | HEART RATE: 89 BPM | SYSTOLIC BLOOD PRESSURE: 117 MMHG

## 2023-06-25 LAB
ANION GAP SERPL CALC-SCNC: 9 MMOL/L — SIGNIFICANT CHANGE UP (ref 5–17)
BUN SERPL-MCNC: 16 MG/DL — SIGNIFICANT CHANGE UP (ref 7–23)
CALCIUM SERPL-MCNC: 8.5 MG/DL — SIGNIFICANT CHANGE UP (ref 8.4–10.5)
CHLORIDE SERPL-SCNC: 107 MMOL/L — SIGNIFICANT CHANGE UP (ref 96–108)
CO2 SERPL-SCNC: 25 MMOL/L — SIGNIFICANT CHANGE UP (ref 22–31)
CREAT SERPL-MCNC: 0.68 MG/DL — SIGNIFICANT CHANGE UP (ref 0.5–1.3)
EGFR: 96 ML/MIN/1.73M2 — SIGNIFICANT CHANGE UP
GLUCOSE SERPL-MCNC: 93 MG/DL — SIGNIFICANT CHANGE UP (ref 70–99)
HCT VFR BLD CALC: 33.7 % — LOW (ref 34.5–45)
HGB BLD-MCNC: 10.8 G/DL — LOW (ref 11.5–15.5)
MCHC RBC-ENTMCNC: 30.9 PG — SIGNIFICANT CHANGE UP (ref 27–34)
MCHC RBC-ENTMCNC: 32 GM/DL — SIGNIFICANT CHANGE UP (ref 32–36)
MCV RBC AUTO: 96.3 FL — SIGNIFICANT CHANGE UP (ref 80–100)
NRBC # BLD: 0 /100 WBCS — SIGNIFICANT CHANGE UP (ref 0–0)
PLATELET # BLD AUTO: 221 K/UL — SIGNIFICANT CHANGE UP (ref 150–400)
POTASSIUM SERPL-MCNC: 4.1 MMOL/L — SIGNIFICANT CHANGE UP (ref 3.5–5.3)
POTASSIUM SERPL-SCNC: 4.1 MMOL/L — SIGNIFICANT CHANGE UP (ref 3.5–5.3)
RBC # BLD: 3.5 M/UL — LOW (ref 3.8–5.2)
RBC # FLD: 14.4 % — SIGNIFICANT CHANGE UP (ref 10.3–14.5)
SODIUM SERPL-SCNC: 141 MMOL/L — SIGNIFICANT CHANGE UP (ref 135–145)
WBC # BLD: 10.96 K/UL — HIGH (ref 3.8–10.5)
WBC # FLD AUTO: 10.96 K/UL — HIGH (ref 3.8–10.5)

## 2023-06-25 PROCEDURE — 80048 BASIC METABOLIC PNL TOTAL CA: CPT

## 2023-06-25 PROCEDURE — 97161 PT EVAL LOW COMPLEX 20 MIN: CPT

## 2023-06-25 PROCEDURE — 86901 BLOOD TYPING SEROLOGIC RH(D): CPT

## 2023-06-25 PROCEDURE — 97116 GAIT TRAINING THERAPY: CPT

## 2023-06-25 PROCEDURE — C1776: CPT

## 2023-06-25 PROCEDURE — 71045 X-RAY EXAM CHEST 1 VIEW: CPT

## 2023-06-25 PROCEDURE — 73560 X-RAY EXAM OF KNEE 1 OR 2: CPT

## 2023-06-25 PROCEDURE — 97110 THERAPEUTIC EXERCISES: CPT

## 2023-06-25 PROCEDURE — 86850 RBC ANTIBODY SCREEN: CPT

## 2023-06-25 PROCEDURE — 36415 COLL VENOUS BLD VENIPUNCTURE: CPT

## 2023-06-25 PROCEDURE — 86900 BLOOD TYPING SEROLOGIC ABO: CPT

## 2023-06-25 PROCEDURE — 97165 OT EVAL LOW COMPLEX 30 MIN: CPT

## 2023-06-25 PROCEDURE — 85027 COMPLETE CBC AUTOMATED: CPT

## 2023-06-25 PROCEDURE — C1889: CPT

## 2023-06-25 PROCEDURE — C1713: CPT

## 2023-06-25 PROCEDURE — S2900: CPT

## 2023-06-25 RX ADMIN — PANTOPRAZOLE SODIUM 40 MILLIGRAM(S): 20 TABLET, DELAYED RELEASE ORAL at 05:57

## 2023-06-25 RX ADMIN — Medication 81 MILLIGRAM(S): at 05:57

## 2023-06-25 RX ADMIN — TRAMADOL HYDROCHLORIDE 50 MILLIGRAM(S): 50 TABLET ORAL at 06:56

## 2023-06-25 RX ADMIN — CELECOXIB 200 MILLIGRAM(S): 200 CAPSULE ORAL at 05:56

## 2023-06-25 RX ADMIN — Medication 1000 MILLIGRAM(S): at 05:57

## 2023-06-25 RX ADMIN — OXYCODONE HYDROCHLORIDE 5 MILLIGRAM(S): 5 TABLET ORAL at 12:50

## 2023-06-25 RX ADMIN — Medication 1000 MILLIGRAM(S): at 13:31

## 2023-06-25 RX ADMIN — TRAMADOL HYDROCHLORIDE 50 MILLIGRAM(S): 50 TABLET ORAL at 05:56

## 2023-06-25 NOTE — DISCHARGE NOTE NURSING/CASE MANAGEMENT/SOCIAL WORK - PATIENT PORTAL LINK FT
You can access the FollowMyHealth Patient Portal offered by [facility] by registering at the following website: [email or web address]. By joining [facility]’s FollowMyHealth portal, you will also be able to view your health information using other applications (apps) compatible with our system.

## 2023-06-25 NOTE — PROGRESS NOTE ADULT - REASON FOR ADMISSION
left knee pain, right knee pain

## 2023-06-25 NOTE — DISCHARGE NOTE NURSING/CASE MANAGEMENT/SOCIAL WORK - NSDCPEFALRISK_GEN_ALL_CORE
For information on Fall & Injury Prevention, visit: [email or web address] OR  [email or web address] OR  https://www.cdc.gov/steadi/patient.html

## 2023-06-25 NOTE — DISCHARGE NOTE NURSING/CASE MANAGEMENT/SOCIAL WORK - HISTORY OF COVID-19 VACCINATION
[facility] [place] [facility] [place] [address] Counts include 234 beds at the [facility] [address] [place] [address]    Phone:  [phone]    Fax:  [phone]       Thank You for choosing us for your health care visit. We are glad to serve you and happy to provide you with this summary of your visit. Please help us to ensure we have accurate records. If you find anything that needs to be changed, please let our staff know as soon as possible.          Your Demographic Information     Patient Name Sex Cuong Guillermo Male 1925       Ethnic Group Patient Race    Not of  or  Origin White      Your Visit Details     Date & Time Provider Department    2017 10:05 AM [facility]      Your Upcoming Appointment*(Max 10)     2017 10:00 AM CST Medicare Well Visit with Dominguez Nguyen MD   [facility]note 3[facility] Internal Medicine [place] ([facility])    [address] [place] [address] [address] [place] [address]   [phone]              Your To Do List     Future Orders Please Complete On or Around Expires    XR HIP 2 VW LEFT      Follow-Up    Return if symptoms worsen or fail to improve.      Conditions Discussed Today or Order-Related Diagnoses        Comments    Left hip pain    -  Primary       Your Vitals Were     BP Pulse Temp Weight SpO2 BMI    116/58 78 97.6 °F (36.4 °C) (Temporal Artery) 181 lb 6.4 oz (82.3 kg) 97% 25.66 kg/m2    Smoking Status                   Never Smoker           Medications Prescribed or Re-Ordered Today     traMADol (ULTRAM) 50 MG tablet    Sig - Route: Take 1 tablet by mouth every 8 hours as needed for Pain. - Oral    Class: Normal    Pharmacy: [facility] [address] [facility] [address]  #: [phone]      Your Current Medications Are        Disp Refills Start End    polyethylene glycol (MIRALAX) powder   2017     Sig -  Route: Take 0.4 g/kg by mouth as needed (constipation). - Oral    Class: Historical Med    lovastatin (MEVACOR) 20 MG tablet 90 tablet 2 11/21/2016     Sig: Take 1 tablet by mouth  nightly    Class: Eprescribe    Multiple Vitamins-Minerals (ICAPS) Cap        Sig - Route: Take 1 capsule by mouth 2 times daily.  - Oral    Class: Historical Med    aspirin 81 MG tablet        Sig - Route: Take 81 mg by mouth daily. - Oral    Class: Historical Med    traMADol (ULTRAM) 50 MG tablet 15 tablet 0 4/19/2017     Sig - Route: Take 1 tablet by mouth every 8 hours as needed for Pain. - Oral      Allergies     Niacin     Flushing      Immunizations History as of 4/19/2017     Name Date    INFLUENZA QUADRIVALENT 9/17/2015    Influenza 10/3/2014, 9/26/2013, 10/15/2012, 9/23/2011, 9/27/2010, 10/16/2009, 10/20/2008, 10/15/2007, 10/16/2006, 10/12/2005    Influenza High Dose Pres Free 10/18/2016 12:52 PM    Pneumococcal Polysaccharide Adult 11/11/2005, 1/20/1993    Td:Adult type tetanus/diphtheria 3/16/1998      Problem List as of 4/19/2017     Coronary atherosclerosis of unspecified type of vessel, native or graft    Sensorineural hearing loss, unspecified    Macular degeneration    Hypercholesterolemia    Other after-cataract, not obscuring vision,right    S/P YAG capsulotomy, left    Fuch's endothelial dystrophy, left    Glaucoma, normal tension    Ulnar neuropathy of right upper extremity    Bilateral carpal tunnel syndrome    Epiretinal membrane, both eyes    Nonexudative age-related macular degeneration both eyes    Acute UTI    Elevated troponin    Weakness generalized    Near syncope              Patient Instructions    · Ice to affected area for approximately 10-15 minutes, 4 times a day for 2 days. After 2 days use heat for 10-15 minutes 4 times a day. If heat makes things worse, go back to ice.  · Range of motion exercises as tolerated.  · Take medications as directed. GI side effects with anti-inflammatories  discussed.  · Follow-up in a week's time if not improving or seek medical attention earlier if worsens.  · If narcotic pain medications or muscle relaxers given, may cause drowsiness.            Yes

## 2023-06-25 NOTE — PROGRESS NOTE ADULT - SUBJECTIVE AND OBJECTIVE BOX
Ortho Note    Pt comfortable without complaints, pain controlled  Denies CP, SOB, N/V, new numbness/tingling     Vital Signs Last 24 Hrs  T(C): 36.2 (25 Jun 2023 05:49), Max: 36.6 (24 Jun 2023 17:11)  T(F): 97.2 (25 Jun 2023 05:49), Max: 97.9 (24 Jun 2023 17:11)  HR: 82 (25 Jun 2023 05:49) (77 - 95)  BP: 104/70 (25 Jun 2023 05:49) (104/67 - 121/81)  BP(mean): --  RR: 16 (25 Jun 2023 05:49) (16 - 17)  SpO2: 93% (25 Jun 2023 05:49) (93% - 95%)    Parameters below as of 25 Jun 2023 05:49  Patient On (Oxygen Delivery Method): room air        PE  General: Pt Alert and oriented, NAD  DSG prevena  C/D/I  Pulses: + 2 DP palpable, cap refill brisk  Sensation: sensations intact to light touch b/l lower extremities  Motor: EHL/FHL/TA/GS 5/5 b/l lower extremities      A/P: 67yFemale s/p L total knee replacement and right knee steroid injection  - Stable  - Pain Control  - DVT ppx: asa 81mg bid  - Post op abx: ancef periop  - PT, WBS: wbat  DISPO: pending pt clearance, current dispo home PT    Ortho Pager [phone]    Ortho Pager [phone]
Procedure: left total knee replacement and right knee corticosteroid injections   Surgeon: Dr. Mitchell    Pt comfortable without complaints, pain controlled with current regimen. Denies CP, SOB, N/V, numbness/tingling. Hasn't yet worked with PT because she was too drowsy postop     Vital Signs Last 24 Hrs  T(C): 36.6 (23 Jun 2023 05:18), Max: 36.6 (22 Jun 2023 20:33)  T(F): 97.9 (23 Jun 2023 05:18), Max: 97.9 (23 Jun 2023 05:18)  HR: 89 (23 Jun 2023 05:18) (89 - 96)  BP: 103/72 (23 Jun 2023 05:18) (103/72 - 153/77)  BP(mean): 91 (22 Jun 2023 19:15) (89 - 111)  RR: 16 (23 Jun 2023 05:18) (8 - 19)  SpO2: 95% (23 Jun 2023 05:18) (95% - 99%)    Parameters below as of 23 Jun 2023 05:18  Patient On (Oxygen Delivery Method): nasal cannula  O2 Flow (L/min): 2      PE  General: Pt Alert and oriented, NAD  DSG prevena  C/D/I  Pulses: + 2 DP palpable, cap refill brisk  Sensation: sensations intact to light touch b/l lower extremities  Motor: EHL/FHL/TA/GS 5/5 b/l lower extremities        A/P: 67yFemale POD#1 s/p left total knee replacement and right knee steroid injection  - Stable  - Pain Control  - DVT ppx: asa 81mg bid  - Post op abx: ancef periop  - PT, WBS: wbat  DISPO: pending pt eval    Ortho Pager [phone]
no acute events overnight    PHYSICAL EXAM:  Physical Exam:  General: NAD  Resp: no increased WOB, CTAB  CVS: RRR, no m/r/g, no pitting edema  GI: +BS, nt/nd  Neuro: alert and oriented to person place and time  MSK: LTKR dressing CDI  
Ortho Note    Pt comfortable without complaints, pain controlled  Denies CP, SOB, N/V, new numbness/tingling     Vital Signs Last 24 Hrs  T(C): 37.1 (06-24-23 @ 05:50), Max: 37.1 (06-24-23 @ 05:50)  T(F): 98.7 (06-24-23 @ 05:50), Max: 98.7 (06-24-23 @ 05:50)  HR: 84 (06-24-23 @ 05:50) (84 - 84)  BP: 103/72 (06-24-23 @ 05:50) (103/72 - 103/72)  BP(mean): --  RR: 16 (06-24-23 @ 05:50) (16 - 16)  SpO2: 94% (06-24-23 @ 05:50) (94% - 94%)  I&O's Summary    23 Jun 2023 07:01  -  24 Jun 2023 07:00  --------------------------------------------------------  IN: 240 mL / OUT: 100 mL / NET: 140 mL      PE  General: Pt Alert and oriented, NAD  DSG prevena  C/D/I  Pulses: + 2 DP palpable, cap refill brisk  Sensation: sensations intact to light touch b/l lower extremities  Motor: EHL/FHL/TA/GS 5/5 b/l lower extremities                          10.7   10.41 )-----------( 152      ( 24 Jun 2023 05:22 )             33.1     06-24    140  |  109<H>  |  24<H>  ----------------------------<  94  4.1   |  23  |  0.72    Ca    8.2<L>      24 Jun 2023 05:22        A/P: 67yFemale POD#2 s/p left total knee replacement and right knee steroid injection  - Stable  - Pain Control  - DVT ppx: asa 81mg bid  - Post op abx: ancef periop  - PT, WBS: wbat  DISPO: pending pt clearance, current dispo home PT    Ortho Pager [phone]    Ortho Pager [phone]
Ortho Note    Pt seen and examined. Comfortable without complaints, pain controlled  Denies CP, SOB, N/V, numbness/tingling. Reports feeling very drowsy and tired after anesthesia yesterday,  but somewhat better this morning. tolerating breakfast    Vital Signs Last 24 Hrs  T(C): 36.6 (06-23-23 @ 05:18), Max: 36.6 (06-23-23 @ 05:18)  T(F): 97.9 (06-23-23 @ 05:18), Max: 97.9 (06-23-23 @ 05:18)  HR: 89 (06-23-23 @ 05:18) (89 - 89)  BP: 103/72 (06-23-23 @ 05:18) (103/72 - 103/72)  BP(mean): --  RR: 16 (06-23-23 @ 05:18) (16 - 16)  SpO2: 95% (06-23-23 @ 05:18) (95% - 95%)  AVSS    General: Pt Alert and oriented, NAD  DSG- prevena C/D/I  Pulses: +2DP, WWP feet  Sensation: SILT BLE  Motor: 5/5 EHL/FHL/TA/GS BLE                            11.3   15.37 )-----------( 226      ( 23 Jun 2023 07:58 )             35.4     06-23    139  |  103  |  24<H>  ----------------------------<  148<H>  4.5   |  25  |  0.95    Ca    8.4      23 Jun 2023 07:58        A/P: 67yFemale POD#1 s/p left total knee replacement, right knee CSI  - VSS, Labs WNL- appreciate internal medicine co-management recs  - Pain Control  - DVT ppx: ASA  - PT, WBS: WBAT  - OOB for meals, I/S  - continue bowel regimen  - dispo: pending PT eval    Ortho Pager [phone]
Ortho Post Op Check    Procedure: left total knee replacement and right knee corticosteroid injections   Surgeon: Dr. Mitchell    Pt comfortable without complaints, pain controlled  Denies CP, SOB, N/V, numbness/tingling     Vital Signs Last 24 Hrs  T(C): 36.2 (06-22-23 @ 15:00), Max: 36.2 (06-22-23 @ 15:00)  T(F): 97.1 (06-22-23 @ 15:00), Max: 97.1 (06-22-23 @ 15:00)  HR: 92 (06-22-23 @ 16:00) (92 - 96)  BP: 148/79 (06-22-23 @ 16:00) (143/74 - 153/77)  BP(mean): 106 (06-22-23 @ 16:00) (104 - 111)  RR: 15 (06-22-23 @ 16:00) (9 - 15)  SpO2: 97% (06-22-23 @ 16:00) (97% - 99%)  I&O's Summary    PE  General: Pt Alert and oriented, NAD  DSG prevena  C/D/I  Pulses: + 2 DP palpable, cap refill brisk  Sensation: sensations intact to light touch b/l lower extremities  Motor: EHL/FHL/TA/GS 5/5 b/l lower extremities    Post-op X-Ray: pending    A/P: 67yFemale POD#0 s/p left total knee replacement and right knee steroid injection  - Stable  - Pain Control  - DVT ppx: asa 81mg bid  - Post op abx: ancef periop  - PT, WBS: wbat  DISPO: pending pt eval    Ortho Pager [phone]

## 2023-06-28 DIAGNOSIS — Z91.040 LATEX ALLERGY STATUS: ICD-10-CM

## 2023-06-28 DIAGNOSIS — E66.9 OBESITY, UNSPECIFIED: ICD-10-CM

## 2023-06-28 DIAGNOSIS — J45.909 UNSPECIFIED ASTHMA, UNCOMPLICATED: ICD-10-CM

## 2023-06-28 DIAGNOSIS — Z96.643 PRESENCE OF ARTIFICIAL HIP JOINT, BILATERAL: ICD-10-CM
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
## 2023-06-28 DIAGNOSIS — R05.9 COUGH, UNSPECIFIED: ICD-10-CM

## 2023-06-28 DIAGNOSIS — T41.205A ADVERSE EFFECT OF UNSPECIFIED GENERAL ANESTHETICS, INITIAL ENCOUNTER: ICD-10-CM

## 2023-06-28 DIAGNOSIS — M17.0 BILATERAL PRIMARY OSTEOARTHRITIS OF KNEE: ICD-10-CM

## 2023-06-28 DIAGNOSIS — Z88.0 ALLERGY STATUS TO PENICILLIN: ICD-10-CM

## 2023-06-28 DIAGNOSIS — I10 ESSENTIAL (PRIMARY) HYPERTENSION: ICD-10-CM

## 2023-06-28 DIAGNOSIS — Z79.899 OTHER LONG TERM (CURRENT) DRUG THERAPY: ICD-10-CM

## 2023-06-28 DIAGNOSIS — E78.5 HYPERLIPIDEMIA, UNSPECIFIED: ICD-10-CM

## 2023-06-28 DIAGNOSIS — R11.0 NAUSEA: ICD-10-CM

## 2023-06-28 DIAGNOSIS — D72.828 OTHER ELEVATED WHITE BLOOD CELL COUNT: ICD-10-CM

## 2023-07-07 ENCOUNTER — APPOINTMENT (OUTPATIENT)
Dept: ORTHOPEDIC SURGERY | Facility: CLINIC | Age: 68
End: 2023-07-07
Payer: SELF-PAY

## 2023-07-07 VITALS
BODY MASS INDEX: 37.73 KG/M2 | HEIGHT: 64 IN | HEART RATE: 114 BPM | DIASTOLIC BLOOD PRESSURE: 83 MMHG | OXYGEN SATURATION: 96 % | SYSTOLIC BLOOD PRESSURE: 128 MMHG | WEIGHT: 221 LBS

## 2023-07-07 PROBLEM — M17.0 BILATERAL PRIMARY OSTEOARTHRITIS OF KNEE: Chronic | Status: ACTIVE | Noted: 2023-06-21

## 2023-07-07 PROCEDURE — 99024 POSTOP FOLLOW-UP VISIT: CPT

## 2023-07-07 NOTE — ADDENDUM
[FreeTextEntry1] : Documented by Dayne Christopher acting as a scribe under Dr. Mitchell. (07/07/2023)\par

## 2023-07-07 NOTE — HISTORY OF PRESENT ILLNESS
[de-identified] : First post-operative visit following her left total knee arthroplasty performed on June 22, 2023. [de-identified] : 7/7/2023: 66 y/o female following up for her first post-operative visit following her left total knee arthroplasty performed on June 22, 2023. She has been recovering at home. She notes that the pain was initially quite severe but is currently managed on multimodal pain management including up to two Oxycodone tablets per day for which she could use a refill. She has been continuing to ambulate using a rolling walker. She is nearly done with her home physical therapy session. She notes no wound or systemic symptoms of infection. [de-identified] : Gait: Cautious, mildly antalgic left gait pattern using a rolling walker. \par \par Left knee: \par - Focal soft tissue swelling: none\par - Ecchymosis: none\par - Erythema: none\par - Effusion: Trace residual w/ no palpable Baker's cyst\par - Wounds: Well-healed midline incision, benign appearing.\par - Alignment: normal\par - Tenderness: none\par - ROM: 0-75\par - Collateral laxity: none\par - Cruciate laxity: none\par - Quad strength: 5/5 [de-identified] : 68 y/o female two weeks status post left total knee arthroplasty. Doing well, however, with early mild arthrofibrosis. \par - Plan is to transition to outpatient physical therapy.\par -I instructed her to continue with multimodal pain management and gradually ween the use of her Oxycodone as tolerated and to ween the use of her assistive devices as tolerated.

## 2023-07-07 NOTE — END OF VISIT
[FreeTextEntry3] : Documented by Dayne Christopher acting as a scribe for Dr. Mateo Mitchell. 07/07/2023\par \par All medical record entries made by the Scribe were at my, Dr. Mateo Mitchell, direction and\par personally dictated by me on 07/07/2023 . I have reviewed the chart and agree that the record\par accurately reflects my personal performance of the history, physical exam, assessment and\par plan. I have also personally directed, reviewed, and agreed with the chart.\par  NAD; Alert & oriented x 4; No focal deficits, motor function, sensorium intact. Airway patent, respirations non-labored, breath sounds equal & clear to auscultation bilaterally; S1/S2, regular rate and rhythm.

## 2023-07-07 NOTE — END OF VISIT
[FreeTextEntry3] : Documented by Dayne Christopher acting as a scribe for Dr. Mateo Mitchell. 07/07/2023\par \par All medical record entries made by the Scribe were at my, Dr. Mateo Mitchell, direction and\par personally dictated by me on 07/07/2023 . I have reviewed the chart and agree that the record\par accurately reflects my personal performance of the history, physical exam, assessment and\par plan. I have also personally directed, reviewed, and agreed with the chart.\par

## 2023-07-07 NOTE — HISTORY OF PRESENT ILLNESS
[de-identified] : First post-operative visit following her left total knee arthroplasty performed on June 22, 2023. [de-identified] : 7/7/2023: 68 y/o female following up for her first post-operative visit following her left total knee arthroplasty performed on June 22, 2023. She has been recovering at home. She notes that the pain was initially quite severe but is currently managed on multimodal pain management including up to two Oxycodone tablets per day for which she could use a refill. She has been continuing to ambulate using a rolling walker. She is nearly done with her home physical therapy session. She notes no wound or systemic symptoms of infection. [de-identified] : Gait: Cautious, mildly antalgic left gait pattern using a rolling walker. \par \par Left knee: \par - Focal soft tissue swelling: none\par - Ecchymosis: none\par - Erythema: none\par - Effusion: Trace residual w/ no palpable Baker's cyst\par - Wounds: Well-healed midline incision, benign appearing.\par - Alignment: normal\par - Tenderness: none\par - ROM: 0-75\par - Collateral laxity: none\par - Cruciate laxity: none\par - Quad strength: 5/5 [de-identified] : 66 y/o female two weeks status post left total knee arthroplasty. Doing well, however, with early mild arthrofibrosis. \par - Plan is to transition to outpatient physical therapy.\par -I instructed her to continue with multimodal pain management and gradually ween the use of her Oxycodone as tolerated and to ween the use of her assistive devices as tolerated.

## 2023-07-07 NOTE — HISTORY OF PRESENT ILLNESS
[de-identified] : First post-operative visit following her left total knee arthroplasty performed on June 22, 2023. [de-identified] : 7/7/2023: 66 y/o female following up for her first post-operative visit following her left total knee arthroplasty performed on June 22, 2023. She has been recovering at home. She notes that the pain was initially quite severe but is currently managed on multimodal pain management including up to two Oxycodone tablets per day for which she could use a refill. She has been continuing to ambulate using a rolling walker. She is nearly done with her home physical therapy session. She notes no wound or systemic symptoms of infection. [de-identified] : Gait: Cautious, mildly antalgic left gait pattern using a rolling walker. \par \par Left knee: \par - Focal soft tissue swelling: none\par - Ecchymosis: none\par - Erythema: none\par - Effusion: Trace residual w/ no palpable Baker's cyst\par - Wounds: Well-healed midline incision, benign appearing.\par - Alignment: normal\par - Tenderness: none\par - ROM: 0-75\par - Collateral laxity: none\par - Cruciate laxity: none\par - Quad strength: 5/5 [de-identified] : 68 y/o female two weeks status post left total knee arthroplasty. Doing well, however, with early mild arthrofibrosis. \par - Plan is to transition to outpatient physical therapy.\par -I instructed her to continue with multimodal pain management and gradually ween the use of her Oxycodone as tolerated and to ween the use of her assistive devices as tolerated.

## 2023-08-03 ENCOUNTER — NON-APPOINTMENT (OUTPATIENT)
Age: 68
End: 2023-08-03

## 2023-08-04 ENCOUNTER — APPOINTMENT (OUTPATIENT)
Dept: ORTHOPEDIC SURGERY | Facility: CLINIC | Age: 68
End: 2023-08-04
Payer: MEDICARE

## 2023-08-04 ENCOUNTER — RESULT REVIEW (OUTPATIENT)
Age: 68
End: 2023-08-04

## 2023-08-04 ENCOUNTER — OUTPATIENT (OUTPATIENT)
Dept: OUTPATIENT SERVICES | Facility: HOSPITAL | Age: 68
LOS: 1 days | End: 2023-08-04
Payer: MEDICARE

## 2023-08-04 VITALS
SYSTOLIC BLOOD PRESSURE: 126 MMHG | HEART RATE: 92 BPM | BODY MASS INDEX: 37.73 KG/M2 | HEIGHT: 64 IN | OXYGEN SATURATION: 97 % | WEIGHT: 221 LBS | DIASTOLIC BLOOD PRESSURE: 82 MMHG

## 2023-08-04 PROCEDURE — 99024 POSTOP FOLLOW-UP VISIT: CPT

## 2023-08-04 PROCEDURE — 73562 X-RAY EXAM OF KNEE 3: CPT

## 2023-08-04 PROCEDURE — 73562 X-RAY EXAM OF KNEE 3: CPT | Mod: 26,LT

## 2023-08-09 NOTE — END OF VISIT
[FreeTextEntry3] : Documented by Dayne Christopher acting as a scribe for Dr. Mateo Mitchell. 08/04/2023   All medical record entries made by the Scribe were at my, Dr. Mateo Mitchell, direction and personally dictated by me on 08/04/2023 . I have reviewed the chart and agree that the record accurately reflects my personal performance of the history, physical exam, assessment and plan. I have also personally directed, reviewed, and agreed with the chart.

## 2023-08-09 NOTE — ADDENDUM
[FreeTextEntry1] : Documented by Dayne Christopher acting as a scribe under Dr. Mitchell. (08/04/2023)

## 2023-08-09 NOTE — HISTORY OF PRESENT ILLNESS
[de-identified] : second post-operative visit following her left total knee arthroplasty performed on June 22, 2023.   [de-identified] : 08/04/2023: 66 y/o female presenting for six week follow up of left total knee arthroplasty. Overall doing well. She states she has been continuing to participate in physical therapy, attending about twice a week. She states her pain is relatively mild about 5/10 most days. She has been taking Tylenol, Celebrex, and occasionally Oxycodone as needed. She is no longer using the Aceta minophen medication. Patient states that she is no longer ambulating with a walker and has been using the cane as of recently. She is overall doing well. She would like to have a repeat right cortisone injection and advised her that she will have to wait until about September 22 which is the earliest date to have it administered.  [de-identified] : Gait: Presents with the use of a cane. Demonstrates normal gait pattern.    Left knee: - Focal soft tissue swelling: none - Ecchymosis: none - Erythema: none - Effusion: Mild w/ no palpable Baker's cyst - Wounds: Well healed surgical incision, benign appearing w/ some mild keloid formation - Alignment: normal - Tenderness: Tenderness along the medial and lateral joint line - ROM: 0-90 - Collateral laxity: none - Cruciate laxity: none - Quad strength: 5/5 [de-identified] : Left knee XRs taken today demonstrate stable appearance of her left TKA without evidence of mechanical complication. Patella sits at appropriate height and tracks centrally. [de-identified] : 66 y/o female about six weeks status post left knee arthroplasty overall doing very well.  - We will provide patient with a new physical therapy script today.  - We will follow-up on September 22, the earliest, to administer a left knee cortisone injection.  - I encouraged patient to wean off the Oxycodone medication and rely primarily on the Acetaminophen and Celecoxib medication for pain management.

## 2023-09-15 ASSESSMENT — HOOS JR
HOOS JR RAW SCORE: 10
BENDING TO THE FLOOR TO PICK UP OBJECT: MODERATE
WALKING ON UNEVEN SURFACE: MODERATE
GOING UP OR DOWN STAIRS: SEVERE
WALKING ON UNEVEN SURFACE: MODERATE
IMPORTED LATERALITY: LEFT
IMPORTED LATERALITY: LEFT
RISING FROM SITTING: MILD
IMPORTED HOOS JR SCORE: 58.93
GOING UP OR DOWN STAIRS: SEVERE
HOOS JR RAW SCORE: 10
LYING IN BED (TURNING OVER, MAINTAINING HIP POSITION): MODERATE
IMPORTED FORM: YES
RISING FROM SITTING: MILD
BENDING TO THE FLOOR TO PICK UP OBJECT: MODERATE
LYING IN BED (TURNING OVER, MAINTAINING HIP POSITION): MODERATE
IMPORTED HOOS JR SCORE: 58.93
IMPORTED FORM: YES

## 2023-09-22 ENCOUNTER — NON-APPOINTMENT (OUTPATIENT)
Age: 68
End: 2023-09-22

## 2023-09-22 ENCOUNTER — APPOINTMENT (OUTPATIENT)
Dept: ORTHOPEDIC SURGERY | Facility: CLINIC | Age: 68
End: 2023-09-22
Payer: MEDICARE

## 2023-09-22 DIAGNOSIS — M17.0 BILATERAL PRIMARY OSTEOARTHRITIS OF KNEE: ICD-10-CM

## 2023-09-22 PROCEDURE — 99213 OFFICE O/P EST LOW 20 MIN: CPT | Mod: 25

## 2023-09-22 PROCEDURE — 20610 DRAIN/INJ JOINT/BURSA W/O US: CPT | Mod: RT

## 2023-10-20 ENCOUNTER — APPOINTMENT (OUTPATIENT)
Dept: ORTHOPEDIC SURGERY | Facility: CLINIC | Age: 68
End: 2023-10-20
Payer: MEDICARE

## 2023-10-20 VITALS
SYSTOLIC BLOOD PRESSURE: 129 MMHG | WEIGHT: 221 LBS | DIASTOLIC BLOOD PRESSURE: 86 MMHG | BODY MASS INDEX: 37.73 KG/M2 | HEIGHT: 64 IN | OXYGEN SATURATION: 97 % | HEART RATE: 87 BPM

## 2023-10-20 PROCEDURE — 20610 DRAIN/INJ JOINT/BURSA W/O US: CPT | Mod: RT

## 2023-10-27 ENCOUNTER — APPOINTMENT (OUTPATIENT)
Dept: ORTHOPEDIC SURGERY | Facility: CLINIC | Age: 68
End: 2023-10-27
Payer: MEDICARE

## 2023-10-27 VITALS
DIASTOLIC BLOOD PRESSURE: 79 MMHG | HEIGHT: 64 IN | OXYGEN SATURATION: 96 % | WEIGHT: 221 LBS | HEART RATE: 97 BPM | BODY MASS INDEX: 37.73 KG/M2 | SYSTOLIC BLOOD PRESSURE: 114 MMHG

## 2023-10-27 PROCEDURE — 20610 DRAIN/INJ JOINT/BURSA W/O US: CPT | Mod: RT

## 2023-11-03 ENCOUNTER — APPOINTMENT (OUTPATIENT)
Dept: ORTHOPEDIC SURGERY | Facility: CLINIC | Age: 68
End: 2023-11-03
Payer: MEDICARE

## 2023-11-03 VITALS
DIASTOLIC BLOOD PRESSURE: 83 MMHG | WEIGHT: 221 LBS | HEIGHT: 64 IN | HEART RATE: 76 BPM | OXYGEN SATURATION: 96 % | SYSTOLIC BLOOD PRESSURE: 126 MMHG | BODY MASS INDEX: 37.73 KG/M2

## 2023-11-03 PROCEDURE — 20610 DRAIN/INJ JOINT/BURSA W/O US: CPT | Mod: RT

## 2023-11-09 DIAGNOSIS — M25.50 PAIN IN UNSPECIFIED JOINT: ICD-10-CM

## 2023-11-17 ENCOUNTER — APPOINTMENT (OUTPATIENT)
Dept: ORTHOPEDIC SURGERY | Facility: CLINIC | Age: 68
End: 2023-11-17

## 2023-11-21 NOTE — ADDENDUM
[FreeTextEntry1] : The patient’s case was personally discussed with Dr. Raygoza who was in agreement with the assessment and plan\par \par  No restrictions

## 2023-12-22 ENCOUNTER — APPOINTMENT (OUTPATIENT)
Dept: ORTHOPEDIC SURGERY | Facility: CLINIC | Age: 68
End: 2023-12-22

## 2024-01-10 ENCOUNTER — OUTPATIENT (OUTPATIENT)
Dept: OUTPATIENT SERVICES | Facility: HOSPITAL | Age: 69
LOS: 1 days | End: 2024-01-10
Payer: MEDICARE

## 2024-01-10 ENCOUNTER — RESULT REVIEW (OUTPATIENT)
Age: 69
End: 2024-01-10

## 2024-01-10 ENCOUNTER — APPOINTMENT (OUTPATIENT)
Dept: ORTHOPEDIC SURGERY | Facility: CLINIC | Age: 69
End: 2024-01-10
Payer: MEDICARE

## 2024-01-10 VITALS
OXYGEN SATURATION: 96 % | DIASTOLIC BLOOD PRESSURE: 87 MMHG | WEIGHT: 221 LBS | HEART RATE: 97 BPM | SYSTOLIC BLOOD PRESSURE: 133 MMHG | HEIGHT: 64 IN | BODY MASS INDEX: 37.73 KG/M2

## 2024-01-10 DIAGNOSIS — Z96.641 PRESENCE OF RIGHT ARTIFICIAL HIP JOINT: Chronic | ICD-10-CM

## 2024-01-10 DIAGNOSIS — Z98.89 OTHER SPECIFIED POSTPROCEDURAL STATES: Chronic | ICD-10-CM

## 2024-01-10 PROCEDURE — 73564 X-RAY EXAM KNEE 4 OR MORE: CPT

## 2024-01-10 PROCEDURE — 99213 OFFICE O/P EST LOW 20 MIN: CPT | Mod: 25

## 2024-01-10 PROCEDURE — 20610 DRAIN/INJ JOINT/BURSA W/O US: CPT | Mod: RT

## 2024-01-10 PROCEDURE — 73564 X-RAY EXAM KNEE 4 OR MORE: CPT | Mod: 26,50

## 2024-01-16 NOTE — DISCUSSION/SUMMARY
[de-identified] : 1/10/2024 67 y/o F with well-functioning left TKA, advanced right knee osteoarthritis - Right knee CSI administered today - She is indicated at this time for right total knee arthroplasty with Yen robotic assistance - We discussed the details of the procedure, the expected recovery period, and the expected outcome. We discussed the likelihood of satisfaction after complete recovery, and the potential causes of dissatisfaction. The importance of active patient participation in the rehabilitation protocol was emphasized, along with its influence on short and long-term outcomes. Specific risks of total knee replacement were discussed in detail. We discussed the risk of surgical site complications including but not limited to: surgical site infection, wound healing complications, bone fracture, tendon or ligament injury, neurovascular injury, hemorrhage, postoperative stiffness or instability, persistent pain and need for reoperation or manipulation under anesthesia. We discussed that it is typical to develop numbness of the knee lateral to the incision, which in most cases does improve over the course of the first postoperative year, but which can also be permanent. We discussed surgical blood loss and the possible need for blood transfusion. We discussed the risk of perioperative medical complications, including but not limited to catheter-associated urinary tract infection, venous thromboembolism and other cardiopulmonary complications. We discussed anesthetic options and the risk of anesthesia-related complications. We discussed implant fixation methods; my plan would be to use fully cemented fixation in this case. We discussed the variable need to resurface the patella; my plan would be to resurface in this case. We discussed the durability of prosthetic knees and limitations related to wear, osteolysis and loosening.  We discussed the role of the robot in helping to guide bone resections and measure alignment and soft tissue balance. All questions were answered the patient's satisfaction. The patient was given a copy of my preoperative packet with additional information about the procedure. I asked the patient to either call back or schedule a followup appointment for any additional questions or concerns regarding the procedure. - She will return to the office most likely in late March or early April of 2024 to pursue further surgical planning for the right knee.

## 2024-01-16 NOTE — PHYSICAL EXAM
[de-identified] : General appearance: well nourished and hydrated, pleasant, alert and oriented x 3, cooperative. Obese body habitus. HEENT: normocephalic, EOM intact, wearing mask, external auditory canal clear.  Cardiovascular: no lower leg edema, no varicosities, dorsalis pedis pulses palpable and symmetric.  Lymphatics: no palpable lymphadenopathy, no lymphedema.  Neurologic: sensation is normal, no muscle weakness in upper or lower extremities, patella tendon reflexes present and symmetric.  Dermatologic: skin moist, warm, no rash.  Spine: cervical spine with normal lordosis and painless range of motion, thoracic spine with normal kyphosis and painless range of motion, lumbosacral spine with normal lordosis and restricted uncomfortable range of motion.  Gait: slow to stand and get started. Right antalgia.  Right knee: - Inspection: difficult to assess swelling; negative ecchymosis and erythema.  - Wounds: none.  - Alignment: normal.  - Palpation: medial and lateral joint line, peripatellar tenderness on palpation.  - ROM active: 5-115, discomfort with deep flexion - Ligamentous laxity: negative Lachman, negative ant. drawer test, negative post. drawer test, negative pivot shift test, stable to varus stress, stable to valgus stress.  - Popliteal angle: 45 degrees - Muscle Test: 5/5 quad strength.  [de-identified] : Bilateral knee x-rays were taken today, January 10th, 2024 at [facility]. - These demonstrate for the right knee varus alignment. - Tricompartmental osteoarthritis, most pronounced medially with bone-on-bone articulation Kellgren Capo 4. - Patella sits at normal height and tracks centrally.  - Left knee demonstrates the total knee arthroplasty and position. All components appear to be well fixed in unchanged alignment and position as compared to prior imaging. Without evidence of mechanical complication. - Patella sits at normal height and tracks centrally.

## 2024-01-16 NOTE — PROCEDURE
[de-identified] : Procedure: Knee joint injection Laterality: right Indication: Osteoarthritis - discussed with patient Skin prep: alcohol and chlorhexidine Anesthesia: ethyl chloride spray Needle: 20-gauge Portal: inferolateral Aspiration: none Injectate: 2cc of 2% lidocaine, 2cc of 0.5% bupivacaine, and 1cc of 40mg/mL triamcinolone Dressing: Band-aid Complications: None

## 2024-01-16 NOTE — END OF VISIT
[FreeTextEntry3] : All medical record entries made by the Scribe were at my, Dr. Mateo Mitchell's, direction and personally dictated by me on 01/10/2024. I have reviewed the chart and agree that the record accurately reflects my personal performance of the history, physical exam, assessment and plan. I have also personally directed, reviewed, and agreed with the chart.

## 2024-01-16 NOTE — ADDENDUM
[FreeTextEntry1] :  I, Marcell Hollingsworth, acted as a scribe on behalf of Dr. Mitchell 01/10/2024.

## 2024-01-16 NOTE — HISTORY OF PRESENT ILLNESS
[Alert] : alert [Well Nourished] : well nourished [No Acute Distress] : no acute distress [de-identified] : L TKA 6/22/23 Bilateral JOVI (R Oct 2016, L Sept 2019) by Dr. Raygoza  1/10/2024 67 y/o F following up for right knee osteoarthritis and left total knee arthroplasty. We last saw her in November at which time we were finishing a gel series to the right knee that she reports gave good relief of symptoms. She's presenting today for another right knee cortisone injection. She is starting to contemplate booking right total knee arthroplasty sometime in May of 2024.  09/22/2023 Follow-up: 67 year-old female following up, now 3 months, status post total left knee replacement and right knee cortisone injection. She reports that the left knee is doing very well at this time with no pain. She is continuing with physical therapy for it, but notes that right knee has been particularly painful the past week. She is presenting today for another cortisone shot.   03/15/2023: 68 y/o female f/u for b/l THAs and b/l knee OA. She reports diminishing relief from her most recent CSI to both knees administered on 12/14/22. While she would like to repeat b/l knee CSI today, she is also interested in scheduling left TKA sometime in the summer.   8/2/21: Reports about 5mo pain relief from last visit's CSI. Would like another. Hips remain fine. Insurance denied HA. She is already doing PT for the right shoulder but would take a HEP for the knees.  2/4/21: 66y/o female following up for bilateral knee osteoarthritis. Also s/p bilateral JOVI (R Oct 2016, L Sept 2019 by Dr. Raygoza, robotic superior). The bilateral knee CSI from May was very effective but has recently begun to wear off, and she would like to repeat them today. The HA was never authorized. She does recall having had a good response to HA in the past, but this was under a difference insurance plan. The hips remain fine. She is having increasing problems relating to her low back and is planning to follow up with Dr. Vealzco for possible surgical discussion. She is not currently exercising at all and would be open to getting back into PT.  [Well Developed] : well developed [Normal Sclera/Conjunctiva] : normal sclera/conjunctiva [EOMI] : extra ocular movement intact [No Proptosis] : no proptosis [Normal Oropharynx] : the oropharynx was normal [No Thyroid Nodules] : no palpable thyroid nodules [No Respiratory Distress] : no respiratory distress [No Accessory Muscle Use] : no accessory muscle use [Clear to Auscultation] : lungs were clear to auscultation bilaterally [Normal S1, S2] : normal S1 and S2 [Normal Rate] : heart rate was normal [No Edema] : no peripheral edema [Regular Rhythm] : with a regular rhythm [Pedal Pulses Normal] : the pedal pulses are present [Normal Bowel Sounds] : normal bowel sounds [Not Tender] : non-tender [Not Distended] : not distended [Soft] : abdomen soft [Normal Anterior Cervical Nodes] : no anterior cervical lymphadenopathy [Normal Posterior Cervical Nodes] : no posterior cervical lymphadenopathy [No Spinal Tenderness] : no spinal tenderness [Spine Straight] : spine straight [No Stigmata of Cushings Syndrome] : no stigmata of Cushings Syndrome [Normal Gait] : normal gait [Normal Strength/Tone] : muscle strength and tone were normal [No Rash] : no rash [Acanthosis Nigricans] : no acanthosis nigricans [Normal Reflexes] : deep tendon reflexes were 2+ and symmetric [No Tremors] : no tremors [Oriented x3] : oriented to person, place, and time [de-identified] : Mildly enlarged thyroid on exam

## 2024-02-06 ENCOUNTER — RX RENEWAL (OUTPATIENT)
Age: 69
End: 2024-02-06

## 2024-03-08 ENCOUNTER — APPOINTMENT (OUTPATIENT)
Dept: ORTHOPEDIC SURGERY | Facility: CLINIC | Age: 69
End: 2024-03-08
Payer: MEDICARE

## 2024-03-08 VITALS
HEART RATE: 83 BPM | OXYGEN SATURATION: 97 % | HEIGHT: 64 IN | DIASTOLIC BLOOD PRESSURE: 89 MMHG | SYSTOLIC BLOOD PRESSURE: 135 MMHG | WEIGHT: 221 LBS | BODY MASS INDEX: 37.73 KG/M2

## 2024-03-08 PROCEDURE — 99214 OFFICE O/P EST MOD 30 MIN: CPT

## 2024-03-12 NOTE — DISCUSSION/SUMMARY
[de-identified] : 68F with well functioning left total knee replacement and advanced right knee osteoarthritis.  - She has indicated today for right total knee replacement with DEMETRIUS robotic assistance. - We discussed the details of the procedure, the expected recovery period, and the expected outcome. We discussed the likelihood of satisfaction after complete recovery, and the potential causes of dissatisfaction. The importance of active patient participation in the rehabilitation protocol was emphasized, along with its influence on short and long-term outcomes. Specific risks of total knee replacement were discussed in detail. We discussed the risk of surgical site complications including but not limited to: surgical site infection, wound healing complications, bone fracture, tendon or ligament injury, neurovascular injury, hemorrhage, postoperative stiffness or instability, persistent pain and need for reoperation or manipulation under anesthesia. We discussed that it is typical to develop numbness of the knee lateral to the incision, which in most cases does improve over the course of the first postoperative year, but which can also be permanent. We discussed surgical blood loss and the possible need for blood transfusion. We discussed the risk of perioperative medical complications, including but not limited to catheter-associated urinary tract infection, venous thromboembolism and other cardiopulmonary complications. We discussed anesthetic options and the risk of anesthesia-related complications. We discussed implant fixation methods; my plan would be to use fully cemented fixation in this case. We discussed the variable need to resurface the patella; my plan would be to resurface in this case. We discussed the durability of prosthetic knees and limitations related to wear, osteolysis and loosening.  We discussed the role of the robot in helping to guide bone resections and measure alignment and soft tissue balance. All questions were answered the patient's satisfaction. The patient was given a copy of my preoperative packet with additional information about the procedure. I asked the patient to either call back or schedule a followup appointment for any additional questions or concerns regarding the procedure. - She will be booked for surgery at a convenient time (she indicates that she would like to target the second week of May which we should be able to accommodate) with routine medical clearance. - Surgical notes, contralateral knee impants were E left tibia with stubby 8 left standard PS femur and 10 PS poly with no patella resurfacing.  - The left knee was a hybrid cemented construct with tibial cementation but no femoral cementation. - Cementless implants should be available on the day of surgery for the right knee replacement.

## 2024-03-12 NOTE — PHYSICAL EXAM
[de-identified] : General appearance: well nourished and hydrated, pleasant, alert and oriented x 3, cooperative. Obese body habitus. HEENT: normocephalic, EOM intact, wearing mask, external auditory canal clear.  Cardiovascular: no lower leg edema, no varicosities, dorsalis pedis pulses palpable and symmetric.  Lymphatics: no palpable lymphadenopathy, no lymphedema.  Neurologic: sensation is normal, no muscle weakness in upper or lower extremities, patella tendon reflexes present and symmetric.  Dermatologic: skin moist, warm, no rash.  Spine: cervical spine with normal lordosis and painless range of motion, thoracic spine with normal kyphosis and painless range of motion, lumbosacral spine with normal lordosis and restricted uncomfortable range of motion.  Gait: slow to stand and get started. Right antalgia.  Right knee: - Inspection: difficult to assess swelling; negative ecchymosis and erythema.  - Wounds: none.  - Alignment: normal.  - Palpation: medial and lateral joint line, peripatellar tenderness on palpation.  - ROM active: 5-115, discomfort with deep flexion - Ligamentous laxity: negative Lachman, negative ant. drawer test, negative post. drawer test, negative pivot shift test, stable to varus stress, stable to valgus stress.  - Popliteal angle: 45 degrees - Muscle Test: 5/5 quad strength.

## 2024-03-12 NOTE — HISTORY OF PRESENT ILLNESS
[de-identified] : L TKA 6/22/23 Bilateral JOVI (R Oct 2016, L Sept 2019) by Dr. Raygoza  3/8/24: 69y/o female following up for bilateral JOVI, L TKA, R knee OA. Would like to follow through on surgical booking for R TKA. Does report ongoing worsening right knee pain and gradual loss of ambulatory tolerance. Thinks weight has been stable. No new medical issues to report.  09/22/2023 Follow-up: 67 year-old female following up, now 3 months, status post total left knee replacement and right knee cortisone injection. She reports that the left knee is doing very well at this time with no pain. She is continuing with physical therapy for it, but notes that right knee has been particularly painful the past week. She is presenting today for another cortisone shot.   03/15/2023: 66 y/o female f/u for b/l THAs and b/l knee OA. She reports diminishing relief from her most recent CSI to both knees administered on 12/14/22. While she would like to repeat b/l knee CSI today, she is also interested in scheduling left TKA sometime in the summer.   8/2/21: Reports about 5mo pain relief from last visit's CSI. Would like another. Hips remain fine. Insurance denied HA. She is already doing PT for the right shoulder but would take a HEP for the knees.  2/4/21: 64y/o female following up for bilateral knee osteoarthritis. Also s/p bilateral JOVI (R Oct 2016, L Sept 2019 by Dr. Raygoza, robotic superior). The bilateral knee CSI from May was very effective but has recently begun to wear off, and she would like to repeat them today. The HA was never authorized. She does recall having had a good response to HA in the past, but this was under a difference insurance plan. The hips remain fine. She is having increasing problems relating to her low back and is planning to follow up with Dr. Velazco for possible surgical discussion. She is not currently exercising at all and would be open to getting back into PT.

## 2024-03-12 NOTE — END OF VISIT
[FreeTextEntry3] :  All medical record entries made by the Scribe were at my, Dr. Mateo Mitchell's, direction and personally dictated by me on 03/08/2024. I have reviewed the chart and agree that the record accurately reflects my personal performance of the history, physical exam, assessment and plan. I have also personally directed, reviewed, and agreed with the chart.

## 2024-04-19 ENCOUNTER — NON-APPOINTMENT (OUTPATIENT)
Age: 69
End: 2024-04-19

## 2024-04-23 ENCOUNTER — NON-APPOINTMENT (OUTPATIENT)
Age: 69
End: 2024-04-23

## 2024-04-24 ENCOUNTER — NON-APPOINTMENT (OUTPATIENT)
Age: 69
End: 2024-04-24

## 2024-04-24 ENCOUNTER — APPOINTMENT (OUTPATIENT)
Dept: ORTHOPEDIC SURGERY | Facility: CLINIC | Age: 69
End: 2024-04-24
Payer: MEDICAID

## 2024-04-24 VITALS
BODY MASS INDEX: 37.56 KG/M2 | DIASTOLIC BLOOD PRESSURE: 79 MMHG | OXYGEN SATURATION: 96 % | SYSTOLIC BLOOD PRESSURE: 113 MMHG | HEART RATE: 96 BPM | HEIGHT: 64 IN | WEIGHT: 220 LBS

## 2024-04-24 DIAGNOSIS — M17.11 UNILATERAL PRIMARY OSTEOARTHRITIS, RIGHT KNEE: ICD-10-CM

## 2024-04-24 PROCEDURE — 99214 OFFICE O/P EST MOD 30 MIN: CPT

## 2024-04-24 RX ORDER — GABAPENTIN 300 MG/1
300 CAPSULE ORAL
Qty: 30 | Refills: 2 | Status: ACTIVE | COMMUNITY
Start: 2024-04-24 | End: 1900-01-01

## 2024-04-25 NOTE — HISTORY OF PRESENT ILLNESS
[de-identified] : L TKA 6/22/23 Bilateral JOVI (R Oct 2016, L Sept 2019) by Dr. Raygoza  04/24/2024: 68 year old female previously indicated for a right total knee replacement, which has already been scheduled for mid-May. She is here for a new complaint of left thigh pain without history of injury or inciting event. Pain is localized to the lateral aspect of hip, radiating to anterior aspect of thigh through anteromedial aspect of left knee. Pain appears to be worst upon waking in the morning. She is taking Celebrex and Aleve with questionable relief of symptoms. She reports that symptoms are worsened by particular positional changes, in particular laying on her left side. She also notes worsening back pain at the site of prior L4-L5 fusion.  3/8/24: 67y/o female following up for bilateral JOVI, L TKA, R knee OA. Would like to follow through on surgical booking for R TKA. Does report ongoing worsening right knee pain and gradual loss of ambulatory tolerance. Thinks weight has been stable. No new medical issues to report.  09/22/2023 Follow-up: 67 year-old female following up, now 3 months, status post total left knee replacement and right knee cortisone injection. She reports that the left knee is doing very well at this time with no pain. She is continuing with physical therapy for it, but notes that right knee has been particularly painful the past week. She is presenting today for another cortisone shot.  03/15/2023: 66 y/o female f/u for b/l THAs and b/l knee OA. She reports diminishing relief from her most recent CSI to both knees administered on 12/14/22. While she would like to repeat b/l knee CSI today, she is also interested in scheduling left TKA sometime in the summer.  8/2/21: Reports about 5mo pain relief from last visit's CSI. Would like another. Hips remain fine. Insurance denied HA. She is already doing PT for the right shoulder but would take a HEP for the knees.  2/4/21: 64y/o female following up for bilateral knee osteoarthritis. Also s/p bilateral JOVI (R Oct 2016, L Sept 2019 by Dr. Raygoza, robotic superior). The bilateral knee CSI from May was very effective but has recently begun to wear off, and she would like to repeat them today. The HA was never authorized. She does recall having had a good response to HA in the past, but this was under a difference insurance plan. The hips remain fine. She is having increasing problems relating to her low back and is planning to follow up with Dr. Velazco for possible surgical discussion. She is not currently exercising at all and would be open to getting back into PT.

## 2024-04-25 NOTE — PHYSICAL EXAM
[de-identified] : General appearance: well nourished and hydrated, pleasant, alert and oriented x 3, cooperative.   HEENT: normocephalic, EOM intact, wearing mask, external auditory canal clear.   Cardiovascular: no lower leg edema, no varicosities, dorsalis pedis pulses palpable and symmetric.   Lymphatics: no palpable lymphadenopathy, no lymphedema.   Neurologic: sensation is normal, no muscle weakness in upper or lower extremities, patella tendon reflexes present and symmetric.   Dermatologic: skin moist, warm, no rash.   Spine: well-healed midline incision, benign-appearing; mild-moderate left sacroiliac jointline TTP, no right sacroiliac jointline TTP, cervical spine with normal lordosis and painless range of motion, thoracic spine with normal kyphosis and painless range of motion, lumbosacral spine with normal lordosis and painless range of motion.  No tenderness to palpation along midline spine and paraspinal musculature. Negative SLR and crossed SLR tests bilaterally. Gait: using a shopping cart as an assistive device  Left knee:  - Focal soft tissue swelling: none - Ecchymosis: none - Erythema: none - Effusion: none, no palpable Baker's cyst  - Wounds: well-healed anterior incision, benign-appearing - Alignment: normal - Tenderness: medial > lateral jointline TTP - ROM:  0-105 - Collateral laxity: none - Cruciate laxity: none - Popliteal angle (degrees): 20 - Quad strength: 5/5

## 2024-04-25 NOTE — END OF VISIT
[FreeTextEntry3] : All medical record entries made by the Scribe were at my, Dr. Mateo Mitchell, direction and personally dictated by me on 04/24/2024. I have reviewed the chart and agree that the record accurately reflects my personal performance of the history, physical exam, assessment and plan. I have also personally directed, reviewed, and agreed with the chart.

## 2024-04-25 NOTE — ADDENDUM
[FreeTextEntry1] : I, Juventino Weeks, documented this note as a scribe on behalf of Dr. Mateo Mitchell on 04/24/2024.

## 2024-04-25 NOTE — DISCUSSION/SUMMARY
[de-identified] : Imp: 68 year old female with suspected acute left lumbar radiculopathy, left knee pes anserine bursitis following TKA, as well as known right knee osteoarthritis. - Will treat the issues medically with Medrol dose pack and Celebrex; may also take QHS gabapentin for neuralgia. - Patient will follow up as scheduled with Dr. Velazco, her spinal surgeon. - I do not believe that her scheduled right TKA needs to be postponed for this degree of symptoms, but encouraged patient to contact us sooner if experiencing any worsening left lower extremity pain or loss of function.

## 2024-04-26 ENCOUNTER — OUTPATIENT (OUTPATIENT)
Dept: OUTPATIENT SERVICES | Facility: HOSPITAL | Age: 69
LOS: 1 days | End: 2024-04-26
Payer: MEDICARE

## 2024-04-26 ENCOUNTER — RESULT REVIEW (OUTPATIENT)
Age: 69
End: 2024-04-26

## 2024-04-26 DIAGNOSIS — Z96.641 PRESENCE OF RIGHT ARTIFICIAL HIP JOINT: Chronic | ICD-10-CM

## 2024-04-26 DIAGNOSIS — Z98.89 OTHER SPECIFIED POSTPROCEDURAL STATES: Chronic | ICD-10-CM

## 2024-04-26 PROCEDURE — 77073 BONE LENGTH STUDIES: CPT

## 2024-04-26 PROCEDURE — 77073 BONE LENGTH STUDIES: CPT | Mod: 26

## 2024-05-06 ENCOUNTER — APPOINTMENT (OUTPATIENT)
Dept: ORTHOPEDIC SURGERY | Facility: CLINIC | Age: 69
End: 2024-05-06
Payer: MEDICARE

## 2024-05-06 DIAGNOSIS — M43.16 SPONDYLOLISTHESIS, LUMBAR REGION: ICD-10-CM

## 2024-05-06 DIAGNOSIS — Z98.1 ARTHRODESIS STATUS: ICD-10-CM

## 2024-05-06 PROCEDURE — 99213 OFFICE O/P EST LOW 20 MIN: CPT

## 2024-05-06 PROCEDURE — 72083 X-RAY EXAM ENTIRE SPI 4/5 VW: CPT

## 2024-05-08 ENCOUNTER — TRANSCRIPTION ENCOUNTER (OUTPATIENT)
Age: 69
End: 2024-05-08

## 2024-05-08 VITALS
WEIGHT: 226.64 LBS | HEART RATE: 95 BPM | DIASTOLIC BLOOD PRESSURE: 67 MMHG | SYSTOLIC BLOOD PRESSURE: 125 MMHG | TEMPERATURE: 97 F | OXYGEN SATURATION: 97 % | HEIGHT: 64 IN | RESPIRATION RATE: 16 BRPM

## 2024-05-08 RX ORDER — HYDROCHLOROTHIAZIDE 25 MG
1 TABLET ORAL
Qty: 0 | Refills: 0 | DISCHARGE

## 2024-05-08 RX ORDER — POVIDONE-IODINE 5 %
1 AEROSOL (ML) TOPICAL ONCE
Refills: 0 | Status: COMPLETED | OUTPATIENT
Start: 2024-05-09 | End: 2024-05-09

## 2024-05-08 RX ORDER — PANTOPRAZOLE 40 MG/1
40 TABLET, DELAYED RELEASE ORAL
Qty: 30 | Refills: 2 | Status: ACTIVE | COMMUNITY
Start: 2023-06-21 | End: 1900-01-01

## 2024-05-08 RX ORDER — MORPHINE SULFATE 15 MG/1
15 TABLET, FILM COATED, EXTENDED RELEASE ORAL
Qty: 28 | Refills: 0 | Status: ACTIVE | COMMUNITY
Start: 2023-06-21 | End: 1900-01-01

## 2024-05-08 RX ORDER — ASPIRIN ENTERIC COATED TABLETS 81 MG 81 MG/1
81 TABLET, DELAYED RELEASE ORAL
Qty: 60 | Refills: 0 | Status: ACTIVE | COMMUNITY
Start: 2023-06-21 | End: 1900-01-01

## 2024-05-08 RX ORDER — OXYCODONE 5 MG/1
5 TABLET ORAL
Qty: 50 | Refills: 0 | Status: ACTIVE | COMMUNITY
Start: 2023-06-21 | End: 1900-01-01

## 2024-05-08 RX ORDER — MORPHINE SULFATE 50 MG/1
1 CAPSULE, EXTENDED RELEASE ORAL
Qty: 0 | Refills: 0 | DISCHARGE

## 2024-05-08 NOTE — H&P ADULT - NSHPLABSRESULTS_GEN_ALL_CORE
Preop CBC, BMP, PT/INR, PTT within normal range and reviewed per medical clearance  H/H: 13.4/39.8  Cr: 0.73  A1c: 5.4  UA: trace leuks, moderate bacteria, squamous epithelial cells, urine cx with mixed genital cailin, reviewed per med clearance  Preop EKG sinus rhythm, left atrial enlargement and reviewed per medical clearance  TTE: 4/30/24 LVEF 66%  3M: DOS

## 2024-05-08 NOTE — H&P ADULT - PROBLEM SELECTOR PLAN 1
Admit to Orthopaedic Service.  Presents today for elective right total knee replacement  Pt medically stable and cleared for procedure today by Dr. Vásquez, Dr. Hutson

## 2024-05-08 NOTE — H&P ADULT - NSHPPHYSICALEXAM_GEN_ALL_CORE
MSK: Decreased right knee ROM secondary to pain      Rest of PE per MD clearance MSK:  right le skin intact, no erythema/ecchymosis/sts  SLT INTACT, DP/PT 2+, EHL/TA/GS 5/5  Decreased right knee ROM secondary to pain      Rest of PE per MD clearance

## 2024-05-08 NOTE — ASU PATIENT PROFILE, ADULT - NSICDXPASTSURGICALHX_GEN_ALL_CORE_FT
PAST SURGICAL HISTORY:  H/O breast surgery bilateral lift    H/O total knee replacement left    History of hip replacement, total, right 2016 right , left 2018

## 2024-05-08 NOTE — H&P ADULT - HISTORY OF PRESENT ILLNESS
68yoF with right knee pain x     Presents today for elective right total knee replacement with Dr. Mitchell 68yoF with right knee pain x 3y. Pt. states she has arthritis. Pt. c/o incresaed pain with sitting and going down stairs. Pt. has been using cane assistance x 1y. She has tried conservative treatment including cortisone injections along with taking celebrex and alleve. Denies any numbness/tingling in b/l les.     Presents today for elective right total knee replacement with Dr. Mitchell

## 2024-05-09 ENCOUNTER — APPOINTMENT (OUTPATIENT)
Dept: ORTHOPEDIC SURGERY | Facility: HOSPITAL | Age: 69
End: 2024-05-09

## 2024-05-09 ENCOUNTER — INPATIENT (INPATIENT)
Facility: HOSPITAL | Age: 69
LOS: 3 days | Discharge: HOME HEALTH | DRG: 470 | End: 2024-05-13
Attending: ORTHOPAEDIC SURGERY | Admitting: ORTHOPAEDIC SURGERY
Payer: MEDICARE

## 2024-05-09 DIAGNOSIS — Z96.641 PRESENCE OF RIGHT ARTIFICIAL HIP JOINT: Chronic | ICD-10-CM

## 2024-05-09 DIAGNOSIS — I10 ESSENTIAL (PRIMARY) HYPERTENSION: ICD-10-CM

## 2024-05-09 DIAGNOSIS — E78.5 HYPERLIPIDEMIA, UNSPECIFIED: ICD-10-CM

## 2024-05-09 DIAGNOSIS — J45.909 UNSPECIFIED ASTHMA, UNCOMPLICATED: ICD-10-CM

## 2024-05-09 DIAGNOSIS — Z96.659 PRESENCE OF UNSPECIFIED ARTIFICIAL KNEE JOINT: Chronic | ICD-10-CM

## 2024-05-09 DIAGNOSIS — Z98.89 OTHER SPECIFIED POSTPROCEDURAL STATES: Chronic | ICD-10-CM

## 2024-05-09 DIAGNOSIS — M17.0 BILATERAL PRIMARY OSTEOARTHRITIS OF KNEE: ICD-10-CM

## 2024-05-09 PROCEDURE — S2900 ROBOTIC SURGICAL SYSTEM: CPT | Mod: NC

## 2024-05-09 PROCEDURE — 73560 X-RAY EXAM OF KNEE 1 OR 2: CPT | Mod: 26,RT

## 2024-05-09 PROCEDURE — 27447 TOTAL KNEE ARTHROPLASTY: CPT | Mod: RT

## 2024-05-09 DEVICE — STEM EXT PERSONA 14MM PLUS 30M: Type: IMPLANTABLE DEVICE | Status: FUNCTIONAL

## 2024-05-09 DEVICE — PIN FIX CAS 3.2X80MM STR: Type: IMPLANTABLE DEVICE | Status: FUNCTIONAL

## 2024-05-09 DEVICE — ZIMMER/NEXGEN SMOOTH PIN 3.2X75MM: Type: IMPLANTABLE DEVICE | Status: FUNCTIONAL

## 2024-05-09 DEVICE — SURF ART PERSONA RT 6-9 EF 11MM: Type: IMPLANTABLE DEVICE | Status: FUNCTIONAL

## 2024-05-09 DEVICE — PIN CAS FIX 3.2X150MM: Type: IMPLANTABLE DEVICE | Status: FUNCTIONAL

## 2024-05-09 DEVICE — ZIMMER/NEXGEN HEX HEAD SCREW 3.5MM: Type: IMPLANTABLE DEVICE | Status: FUNCTIONAL

## 2024-05-09 DEVICE — CELLERATE SURGICAL POWDER RX 5GM: Type: IMPLANTABLE DEVICE | Status: FUNCTIONAL

## 2024-05-09 DEVICE — FEM PERSONA PS CMT CCR NRW S27 R: Type: IMPLANTABLE DEVICE | Status: FUNCTIONAL

## 2024-05-09 DEVICE — ZIMMER FEMALE HEX SCREW MAGNETIC 2.5MM X 25MM: Type: IMPLANTABLE DEVICE | Status: FUNCTIONAL

## 2024-05-09 DEVICE — IMPLANTABLE DEVICE: Type: IMPLANTABLE DEVICE | Status: FUNCTIONAL

## 2024-05-09 DEVICE — STEM TIB PERSONA SZ E R 5 DEG: Type: IMPLANTABLE DEVICE | Status: FUNCTIONAL

## 2024-05-09 DEVICE — CEMENT PALACOS R: Type: IMPLANTABLE DEVICE | Status: FUNCTIONAL

## 2024-05-09 RX ORDER — CELECOXIB 200 MG/1
200 CAPSULE ORAL ONCE
Refills: 0 | Status: COMPLETED | OUTPATIENT
Start: 2024-05-09 | End: 2024-05-09

## 2024-05-09 RX ORDER — HYDROMORPHONE HYDROCHLORIDE 2 MG/ML
0.5 INJECTION INTRAMUSCULAR; INTRAVENOUS; SUBCUTANEOUS
Refills: 0 | Status: COMPLETED | OUTPATIENT
Start: 2024-05-09 | End: 2024-05-16

## 2024-05-09 RX ORDER — CELECOXIB 200 MG/1
200 CAPSULE ORAL EVERY 12 HOURS
Refills: 0 | Status: DISCONTINUED | OUTPATIENT
Start: 2024-05-09 | End: 2024-05-09

## 2024-05-09 RX ORDER — OXYCODONE HYDROCHLORIDE 5 MG/1
10 TABLET ORAL
Refills: 0 | Status: DISCONTINUED | OUTPATIENT
Start: 2024-05-09 | End: 2024-05-13

## 2024-05-09 RX ORDER — CELECOXIB 200 MG/1
200 CAPSULE ORAL EVERY 12 HOURS
Refills: 0 | Status: DISCONTINUED | OUTPATIENT
Start: 2024-05-10 | End: 2024-05-13

## 2024-05-09 RX ORDER — SODIUM CHLORIDE 9 MG/ML
1000 INJECTION, SOLUTION INTRAVENOUS
Refills: 0 | Status: DISCONTINUED | OUTPATIENT
Start: 2024-05-09 | End: 2024-05-13

## 2024-05-09 RX ORDER — KETOROLAC TROMETHAMINE 30 MG/ML
15 SYRINGE (ML) INJECTION EVERY 6 HOURS
Refills: 0 | Status: DISCONTINUED | OUTPATIENT
Start: 2024-05-09 | End: 2024-05-10

## 2024-05-09 RX ORDER — OXYCODONE HYDROCHLORIDE 5 MG/1
5 TABLET ORAL
Refills: 0 | Status: DISCONTINUED | OUTPATIENT
Start: 2024-05-09 | End: 2024-05-13

## 2024-05-09 RX ORDER — HYDROMORPHONE HYDROCHLORIDE 2 MG/ML
0.5 INJECTION INTRAMUSCULAR; INTRAVENOUS; SUBCUTANEOUS
Refills: 0 | Status: DISCONTINUED | OUTPATIENT
Start: 2024-05-09 | End: 2024-05-13

## 2024-05-09 RX ORDER — GABAPENTIN 400 MG/1
300 CAPSULE ORAL AT BEDTIME
Refills: 0 | Status: DISCONTINUED | OUTPATIENT
Start: 2024-05-09 | End: 2024-05-13

## 2024-05-09 RX ORDER — SPIRONOLACTONE 25 MG/1
1 TABLET, FILM COATED ORAL
Qty: 0 | Refills: 0 | DISCHARGE

## 2024-05-09 RX ORDER — GABAPENTIN 400 MG/1
0 CAPSULE ORAL
Refills: 0 | DISCHARGE

## 2024-05-09 RX ORDER — ONDANSETRON 8 MG/1
4 TABLET, FILM COATED ORAL EVERY 6 HOURS
Refills: 0 | Status: DISCONTINUED | OUTPATIENT
Start: 2024-05-09 | End: 2024-05-13

## 2024-05-09 RX ORDER — ACETAMINOPHEN 500 MG
1000 TABLET ORAL ONCE
Refills: 0 | Status: COMPLETED | OUTPATIENT
Start: 2024-05-09 | End: 2024-05-09

## 2024-05-09 RX ORDER — APREPITANT 80 MG/1
40 CAPSULE ORAL ONCE
Refills: 0 | Status: COMPLETED | OUTPATIENT
Start: 2024-05-09 | End: 2024-05-09

## 2024-05-09 RX ORDER — ATORVASTATIN CALCIUM 80 MG/1
40 TABLET, FILM COATED ORAL AT BEDTIME
Refills: 0 | Status: DISCONTINUED | OUTPATIENT
Start: 2024-05-09 | End: 2024-05-13

## 2024-05-09 RX ORDER — SPIRONOLACTONE 25 MG/1
50 TABLET, FILM COATED ORAL DAILY
Refills: 0 | Status: DISCONTINUED | OUTPATIENT
Start: 2024-05-09 | End: 2024-05-09

## 2024-05-09 RX ORDER — ACETAMINOPHEN 500 MG
1000 TABLET ORAL EVERY 8 HOURS
Refills: 0 | Status: DISCONTINUED | OUTPATIENT
Start: 2024-05-09 | End: 2024-05-13

## 2024-05-09 RX ORDER — SENNA PLUS 8.6 MG/1
2 TABLET ORAL AT BEDTIME
Refills: 0 | Status: DISCONTINUED | OUTPATIENT
Start: 2024-05-09 | End: 2024-05-13

## 2024-05-09 RX ORDER — CEFAZOLIN SODIUM 1 G
2000 VIAL (EA) INJECTION EVERY 8 HOURS
Refills: 0 | Status: COMPLETED | OUTPATIENT
Start: 2024-05-09 | End: 2024-05-10

## 2024-05-09 RX ORDER — SPIRONOLACTONE 25 MG/1
50 TABLET, FILM COATED ORAL DAILY
Refills: 0 | Status: DISCONTINUED | OUTPATIENT
Start: 2024-05-09 | End: 2024-05-13

## 2024-05-09 RX ORDER — CHLORHEXIDINE GLUCONATE 213 G/1000ML
1 SOLUTION TOPICAL ONCE
Refills: 0 | Status: COMPLETED | OUTPATIENT
Start: 2024-05-09 | End: 2024-05-09

## 2024-05-09 RX ORDER — ASPIRIN/CALCIUM CARB/MAGNESIUM 324 MG
81 TABLET ORAL
Refills: 0 | Status: DISCONTINUED | OUTPATIENT
Start: 2024-05-09 | End: 2024-05-13

## 2024-05-09 RX ADMIN — CHLORHEXIDINE GLUCONATE 1 APPLICATION(S): 213 SOLUTION TOPICAL at 10:38

## 2024-05-09 RX ADMIN — Medication 1000 MILLIGRAM(S): at 23:19

## 2024-05-09 RX ADMIN — Medication 15 MILLIGRAM(S): at 22:19

## 2024-05-09 RX ADMIN — SENNA PLUS 2 TABLET(S): 8.6 TABLET ORAL at 22:19

## 2024-05-09 RX ADMIN — SODIUM CHLORIDE 100 MILLILITER(S): 9 INJECTION, SOLUTION INTRAVENOUS at 17:42

## 2024-05-09 RX ADMIN — Medication 100 MILLIGRAM(S): at 19:30

## 2024-05-09 RX ADMIN — Medication 1000 MILLIGRAM(S): at 22:19

## 2024-05-09 RX ADMIN — Medication 15 MILLIGRAM(S): at 16:30

## 2024-05-09 RX ADMIN — ATORVASTATIN CALCIUM 40 MILLIGRAM(S): 80 TABLET, FILM COATED ORAL at 22:19

## 2024-05-09 RX ADMIN — Medication 1000 MILLIGRAM(S): at 10:32

## 2024-05-09 RX ADMIN — Medication 1 APPLICATION(S): at 10:38

## 2024-05-09 RX ADMIN — Medication 15 MILLIGRAM(S): at 16:10

## 2024-05-09 RX ADMIN — Medication 81 MILLIGRAM(S): at 18:00

## 2024-05-09 RX ADMIN — GABAPENTIN 300 MILLIGRAM(S): 400 CAPSULE ORAL at 22:19

## 2024-05-09 RX ADMIN — CELECOXIB 200 MILLIGRAM(S): 200 CAPSULE ORAL at 10:33

## 2024-05-09 RX ADMIN — Medication 15 MILLIGRAM(S): at 22:34

## 2024-05-09 RX ADMIN — APREPITANT 40 MILLIGRAM(S): 80 CAPSULE ORAL at 10:32

## 2024-05-09 NOTE — PRE-ANESTHESIA EVALUATION ADULT - NSANTHADDINFOFT_GEN_ALL_CORE
GA  / PNB for postop pain  Given h/o spinal fusion will proceed with GA  R/B/A discussed with patient including risks of parasthesia and nerve injury. All questions answered.

## 2024-05-09 NOTE — PHYSICAL THERAPY INITIAL EVALUATION ADULT - GAIT DEVIATIONS NOTED, PT EVAL
patient with fairly steady gait, no LOB, gait distance limited by nausea (VSS)/decreased cezar/decreased step length

## 2024-05-09 NOTE — PHYSICAL THERAPY INITIAL EVALUATION ADULT - GENERAL OBSERVATIONS, REHAB EVAL
NICOLE Hawkins aware of intent to treat. Patient received semi-supine in NAD with +ECG +heplock +R knee prevena +SCD x 2

## 2024-05-09 NOTE — PHYSICAL THERAPY INITIAL EVALUATION ADULT - ADDITIONAL COMMENTS
Patient lives in elevator apartment with ramp to enter. Patient ambulated with SC. Owns RW and commode. Has tub shower with grab bar

## 2024-05-09 NOTE — PHYSICAL THERAPY INITIAL EVALUATION ADULT - PERTINENT HX OF CURRENT PROBLEM, REHAB EVAL
Patient is a 68 year old female with right knee pain x 3y. Pt. states she has arthritis. Pt. c/o incresaed pain with sitting and going down stairs. Pt. has been using cane assistance x 1y. She has tried conservative treatment including cortisone injections along with taking celebrex and alleve. Denies any numbness/tingling in b/l les.

## 2024-05-09 NOTE — PROGRESS NOTE ADULT - SUBJECTIVE AND OBJECTIVE BOX
Ortho Post Op Check    Procedure: R TKA  Surgeon: Oh    Pt comfortable without complaints, pain controlled  Denies CP, SOB, N/V, numbness/tingling     Vital Signs Last 24 Hrs  T(C): 36.7 (05-09-24 @ 17:20), Max: 36.7 (05-09-24 @ 17:20)  T(F): 98 (05-09-24 @ 17:20), Max: 98 (05-09-24 @ 17:20)  HR: 86 (05-09-24 @ 18:20) (79 - 92)  BP: 133/78 (05-09-24 @ 18:20) (129/77 - 154/76)  BP(mean): 101 (05-09-24 @ 18:20) (98 - 106)  RR: 15 (05-09-24 @ 18:20) (8 - 23)  SpO2: 95% (05-09-24 @ 18:20) (91% - 96%)  I&O's Summary      General: Pt Alert and oriented, NAD  DSG C/D/I prevena  B/L LE: sensation intact, DP 2+, brisk cap refill, EHL/FHL/TA/GS 5/5      Post-op X-Ray: hardware well aligned, no fxs    A/P: 68yFemale POD#0 s/p R TKA  - Stable  - Pain Control  - DVT ppx: asa 81 bid  - Post op abx: ancef  - PT, WBS:  wbat    Ortho Pager [phone]

## 2024-05-10 ENCOUNTER — TRANSCRIPTION ENCOUNTER (OUTPATIENT)
Age: 69
End: 2024-05-10

## 2024-05-10 LAB
ANION GAP SERPL CALC-SCNC: 8 MMOL/L — SIGNIFICANT CHANGE UP (ref 5–17)
BUN SERPL-MCNC: 19 MG/DL — SIGNIFICANT CHANGE UP (ref 7–23)
CALCIUM SERPL-MCNC: 8.9 MG/DL — SIGNIFICANT CHANGE UP (ref 8.4–10.5)
CHLORIDE SERPL-SCNC: 106 MMOL/L — SIGNIFICANT CHANGE UP (ref 96–108)
CO2 SERPL-SCNC: 26 MMOL/L — SIGNIFICANT CHANGE UP (ref 22–31)
CREAT SERPL-MCNC: 0.81 MG/DL — SIGNIFICANT CHANGE UP (ref 0.5–1.3)
EGFR: 80 ML/MIN/1.73M2 — SIGNIFICANT CHANGE UP
GLUCOSE SERPL-MCNC: 129 MG/DL — HIGH (ref 70–99)
HCT VFR BLD CALC: 34.4 % — LOW (ref 34.5–45)
HGB BLD-MCNC: 11.5 G/DL — SIGNIFICANT CHANGE UP (ref 11.5–15.5)
MCHC RBC-ENTMCNC: 32 PG — SIGNIFICANT CHANGE UP (ref 27–34)
MCHC RBC-ENTMCNC: 33.4 GM/DL — SIGNIFICANT CHANGE UP (ref 32–36)
MCV RBC AUTO: 95.8 FL — SIGNIFICANT CHANGE UP (ref 80–100)
NRBC # BLD: 0 /100 WBCS — SIGNIFICANT CHANGE UP (ref 0–0)
PLATELET # BLD AUTO: 201 K/UL — SIGNIFICANT CHANGE UP (ref 150–400)
POTASSIUM SERPL-MCNC: 4.7 MMOL/L — SIGNIFICANT CHANGE UP (ref 3.5–5.3)
POTASSIUM SERPL-SCNC: 4.7 MMOL/L — SIGNIFICANT CHANGE UP (ref 3.5–5.3)
RBC # BLD: 3.59 M/UL — LOW (ref 3.8–5.2)
RBC # FLD: 13.6 % — SIGNIFICANT CHANGE UP (ref 10.3–14.5)
SODIUM SERPL-SCNC: 140 MMOL/L — SIGNIFICANT CHANGE UP (ref 135–145)
WBC # BLD: 14.29 K/UL — HIGH (ref 3.8–10.5)
WBC # FLD AUTO: 14.29 K/UL — HIGH (ref 3.8–10.5)

## 2024-05-10 PROCEDURE — 99222 1ST HOSP IP/OBS MODERATE 55: CPT

## 2024-05-10 RX ORDER — ASPIRIN/CALCIUM CARB/MAGNESIUM 324 MG
1 TABLET ORAL
Qty: 0 | Refills: 0 | DISCHARGE
Start: 2024-05-10

## 2024-05-10 RX ORDER — PANTOPRAZOLE SODIUM 20 MG/1
40 TABLET, DELAYED RELEASE ORAL
Refills: 0 | Status: DISCONTINUED | OUTPATIENT
Start: 2024-05-10 | End: 2024-05-13

## 2024-05-10 RX ORDER — OXYCODONE HYDROCHLORIDE 5 MG/1
1 TABLET ORAL
Qty: 0 | Refills: 0 | DISCHARGE
Start: 2024-05-10

## 2024-05-10 RX ORDER — POLYETHYLENE GLYCOL 3350 17 G/17G
17 POWDER, FOR SOLUTION ORAL DAILY
Refills: 0 | Status: DISCONTINUED | OUTPATIENT
Start: 2024-05-10 | End: 2024-05-13

## 2024-05-10 RX ORDER — PANTOPRAZOLE SODIUM 20 MG/1
1 TABLET, DELAYED RELEASE ORAL
Qty: 0 | Refills: 0 | DISCHARGE
Start: 2024-05-10

## 2024-05-10 RX ORDER — ATORVASTATIN CALCIUM 80 MG/1
1 TABLET, FILM COATED ORAL
Qty: 0 | Refills: 0 | DISCHARGE
Start: 2024-05-10

## 2024-05-10 RX ADMIN — Medication 100 MILLIGRAM(S): at 04:14

## 2024-05-10 RX ADMIN — HYDROMORPHONE HYDROCHLORIDE 0.5 MILLIGRAM(S): 2 INJECTION INTRAMUSCULAR; INTRAVENOUS; SUBCUTANEOUS at 16:42

## 2024-05-10 RX ADMIN — Medication 1000 MILLIGRAM(S): at 22:46

## 2024-05-10 RX ADMIN — OXYCODONE HYDROCHLORIDE 5 MILLIGRAM(S): 5 TABLET ORAL at 15:02

## 2024-05-10 RX ADMIN — POLYETHYLENE GLYCOL 3350 17 GRAM(S): 17 POWDER, FOR SOLUTION ORAL at 11:44

## 2024-05-10 RX ADMIN — Medication 1 TABLET(S): at 11:44

## 2024-05-10 RX ADMIN — CELECOXIB 200 MILLIGRAM(S): 200 CAPSULE ORAL at 05:22

## 2024-05-10 RX ADMIN — ONDANSETRON 4 MILLIGRAM(S): 8 TABLET, FILM COATED ORAL at 11:48

## 2024-05-10 RX ADMIN — CELECOXIB 200 MILLIGRAM(S): 200 CAPSULE ORAL at 06:22

## 2024-05-10 RX ADMIN — Medication 15 MILLIGRAM(S): at 04:14

## 2024-05-10 RX ADMIN — ATORVASTATIN CALCIUM 40 MILLIGRAM(S): 80 TABLET, FILM COATED ORAL at 21:47

## 2024-05-10 RX ADMIN — Medication 15 MILLIGRAM(S): at 11:44

## 2024-05-10 RX ADMIN — Medication 81 MILLIGRAM(S): at 18:07

## 2024-05-10 RX ADMIN — HYDROMORPHONE HYDROCHLORIDE 0.5 MILLIGRAM(S): 2 INJECTION INTRAMUSCULAR; INTRAVENOUS; SUBCUTANEOUS at 16:27

## 2024-05-10 RX ADMIN — CELECOXIB 200 MILLIGRAM(S): 200 CAPSULE ORAL at 18:07

## 2024-05-10 RX ADMIN — Medication 15 MILLIGRAM(S): at 04:29

## 2024-05-10 RX ADMIN — SENNA PLUS 2 TABLET(S): 8.6 TABLET ORAL at 21:46

## 2024-05-10 RX ADMIN — GABAPENTIN 300 MILLIGRAM(S): 400 CAPSULE ORAL at 21:46

## 2024-05-10 RX ADMIN — Medication 1000 MILLIGRAM(S): at 21:46

## 2024-05-10 RX ADMIN — Medication 81 MILLIGRAM(S): at 05:22

## 2024-05-10 RX ADMIN — Medication 1000 MILLIGRAM(S): at 06:22

## 2024-05-10 RX ADMIN — Medication 1000 MILLIGRAM(S): at 05:22

## 2024-05-10 RX ADMIN — OXYCODONE HYDROCHLORIDE 5 MILLIGRAM(S): 5 TABLET ORAL at 14:02

## 2024-05-10 NOTE — OCCUPATIONAL THERAPY INITIAL EVALUATION ADULT - GENERAL OBSERVATIONS, REHAB EVAL
Pt received seated in chair, + R knee dressing C/D/I, +prevena, +heplock, NAD, and agreeable to OT. Cleared by NICOLE Hastings to see.

## 2024-05-10 NOTE — DISCHARGE NOTE PROVIDER - NSDCCPTREATMENT_GEN_ALL_CORE_FT
PRINCIPAL PROCEDURE  Procedure: Total knee replacement  Findings and Treatment: right knee osteoarthritis

## 2024-05-10 NOTE — DISCHARGE NOTE PROVIDER - NSDCACTIVITY_GEN_ALL_CORE
Do not drive or operate machinery/Stairs allowed/No heavy lifting/straining/Follow Instructions Provided by your Surgical Team/Activity as tolerated

## 2024-05-10 NOTE — PROGRESS NOTE ADULT - SUBJECTIVE AND OBJECTIVE BOX
Ortho Progress Note    Procedure: RIGHT TKA   Surgeon: Dr. ELVA Mitchell    Pt comfortable without complaints, pain controlled  Denies CP, SOB, N/V, numbness/tingling     Vital Signs Last 24 Hrs  T(C): 36.7 (05-10-24 @ 04:58), Max: 36.7 (05-10-24 @ 04:58)  T(F): 98 (05-10-24 @ 04:58), Max: 98 (05-10-24 @ 04:58)  HR: 77 (05-10-24 @ 04:58) (77 - 77)  BP: 104/68 (05-10-24 @ 04:58) (104/68 - 104/68)  BP(mean): --  RR: 18 (05-10-24 @ 04:58) (18 - 18)  SpO2: 96% (05-10-24 @ 04:58) (96% - 96%)    General: Pt Alert and oriented, NAD  Prevena DSG C/D/I  Pulses: 2+ DP  Sensation: SILT grossly SPN/DPN/Saph/Franco/Tib  Motor: 5/5 TA/GS/EHL, Quad/Psoas firing limited 2/2 pain    Post-op X-Ray: hardware intact w/o fracture    A/P: 68yFemale s/p RIGHT TKA by Dr. ELVA Mitchell on 05-09  - Stable  - Pain Control  - DVT ppx: ASA 81 BID  - Post op abx: Ancef  - WBS: WBAT  - Home PT pending clearance

## 2024-05-10 NOTE — DISCHARGE NOTE NURSING/CASE MANAGEMENT/SOCIAL WORK - PATIENT PORTAL LINK FT
You can access the FollowMyHealth Patient Portal offered by [facility] by registering at the following website: [email or web address]. By joining [employer]’s FollowMyHealth portal, you will also be able to view your health information using other applications (apps) compatible with our system.

## 2024-05-10 NOTE — OCCUPATIONAL THERAPY INITIAL EVALUATION ADULT - ADDITIONAL COMMENTS
Patient states she lives alone in an elevator apartment with ramp to enter. Patient was independent with ADLs, IADLs, and mobility tasks, use of SC. Pt owns a RW, commode, shower chair. Pt has a tub shower with grab bar.

## 2024-05-10 NOTE — DISCHARGE NOTE PROVIDER - CARE PROVIDER_API CALL
Mateo Mitchell  Joint Reconstruction  [address]  [place] [address]  Phone: [phone]  Fax: [phone]  Established Patient  Follow Up Time:

## 2024-05-10 NOTE — DISCHARGE NOTE PROVIDER - NSDCFUSCHEDAPPT_GEN_ALL_CORE_FT
[facility]  ORTHOSURG [address]  Scheduled Appointment: 05/24/2024    Jose Velazco  [facility]  ORTHOSURG [address]  Scheduled Appointment: 08/06/2024     [facility]  ORTHOSURG [address]  Scheduled Appointment: 06/21/2024    Jose Velazco  [facility]  ORTHOSURG [address]  Scheduled Appointment: 08/06/2024

## 2024-05-10 NOTE — CONSULT NOTE ADULT - SUBJECTIVE AND OBJECTIVE BOX
HPI "68yoF with right knee pain x 3y. Pt. states she has arthritis. Pt. c/o incresaed pain with sitting and going down stairs. Pt. has been using cane assistance x 1y. She has tried conservative treatment including cortisone injections along with taking celebrex and alleve. Denies any numbness/tingling in b/l les.     Presents today for elective right total knee replacement with Dr. Mitchell (08 May 2024 10:31)"    68F w HTN, HLD, Obesity, here for elective R TKR w Dr. Mitchell, for HPT    Reports pain is controlled iN R knee today. No LH/dizziness, chest pain, dyspnea. Eating wo N/V/Abd pain. +flatus. Voiding wo dysuria. No fevers or dysuria.    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 , left 2018      H/O total knee replacement  left        Home Meds: Home Medications:  acetaminophen 500 mg oral tablet: 2 tab(s) orally every 8 hours (09 May 2024 10:13)  aspirin 81 mg oral tablet, chewable: 1 tab(s) orally 2 times a day (10 May 2024 13:37)  atorvastatin 40 mg oral tablet: 1 tab(s) orally once a day (at bedtime) (10 May 2024 13:37)  celecoxib 200 mg oral capsule: 1 cap(s) orally every 12 hours (09 May 2024 10:13)  gabapentin 300 mg oral tablet: orally once a day (at bedtime) (09 May 2024 10:13)  Multiple Vitamins oral tablet: 1 tab(s) orally once a day (10 May 2024 13:37)  oxyCODONE 5 mg oral tablet: 1 tab(s) orally every 3 hours As needed Moderate Pain (4 - 6) (10 May 2024 13:37)  pantoprazole 40 mg oral delayed release tablet: 1 tab(s) orally once a day (before a meal) (10 May 2024 13:37)  spironolactone 50 mg oral tablet: 1 tablet orally once a day . Continue to take your blood pressure at home and follow-up with your primary care provider. (09 May 2024 10:13)  vitamin c: 500 milligram(s) orally once a day (09 May 2024 10:13)    Allergies: Allergies    latex (Rash)  penicillin (Rash)    Intolerances      Soc:   Advanced Directives: Presumed Full Code     CURRENT MEDICATIONS:   --------------------------------------------------------------------------------------  Neurologic Medications  acetaminophen     Tablet .. 1000 milliGRAM(s) Oral every 8 hours  celecoxib 200 milliGRAM(s) Oral every 12 hours  gabapentin 300 milliGRAM(s) Oral at bedtime  HYDROmorphone  Injectable 0.5 milliGRAM(s) IV Push every 15 minutes PRN Breakthrough pain pacu only  HYDROmorphone  Injectable 0.5 milliGRAM(s) IV Push every 3 hours PRN Breakthrough pain  ondansetron Injectable 4 milliGRAM(s) IV Push every 6 hours PRN Nausea and/or Vomiting  oxyCODONE    IR 5 milliGRAM(s) Oral every 3 hours PRN Moderate Pain (4 - 6)  oxyCODONE    IR 10 milliGRAM(s) Oral every 3 hours PRN Severe Pain (7 - 10)    Respiratory Medications    Cardiovascular Medications  spironolactone 50 milliGRAM(s) Oral daily    Gastrointestinal Medications  lactated ringers. 1000 milliLiter(s) IV Continuous <Continuous>  multivitamin 1 Tablet(s) Oral daily  pantoprazole    Tablet 40 milliGRAM(s) Oral before breakfast  polyethylene glycol 3350 17 Gram(s) Oral daily  senna 2 Tablet(s) Oral at bedtime    Genitourinary Medications    Hematologic/Oncologic Medications  aspirin  chewable 81 milliGRAM(s) Oral two times a day    Antimicrobial/Immunologic Medications    Endocrine/Metabolic Medications  atorvastatin 40 milliGRAM(s) Oral at bedtime    Topical/Other Medications    --------------------------------------------------------------------------------------    VITAL SIGNS, INS/OUTS (last 24 hours):  --------------------------------------------------------------------------------------  ICU Vital Signs Last 24 Hrs  T(C): 36.8 (10 May 2024 08:48), Max: 36.8 (09 May 2024 23:57)  T(F): 98.3 (10 May 2024 08:48), Max: 98.3 (09 May 2024 23:57)  HR: 80 (10 May 2024 14:30) (77 - 89)  BP: 99/55 (10 May 2024 14:30) (94/48 - 137/80)  BP(mean): 93 (09 May 2024 21:00) (93 - 103)  ABP: --  ABP(mean): --  RR: 18 (10 May 2024 08:48) (9 - 18)  SpO2: 95% (10 May 2024 08:48) (91% - 96%)    O2 Parameters below as of 10 May 2024 08:48  Patient On (Oxygen Delivery Method): nasal cannula  O2 Flow (L/min): 2        I&O's Summary    09 May 2024 07:01  -  10 May 2024 07:00  --------------------------------------------------------  IN: 100 mL / OUT: 200 mL / NET: -100 mL    10 May 2024 07:01  -  10 May 2024 16:32  --------------------------------------------------------  IN: 360 mL / OUT: 0 mL / NET: 360 mL      --------------------------------------------------------------------------------------    EXAM:  GEN: female in NAD on RA  HEENT: NC/AT, MMM  CV: RRR, nml S1S2, no murmurs  PULM: nml effort, CTAB  ABD: Soft, non-distended, NABS, non-tender  NEURO  A/O x3, moving all extremities, Sensation intact  R knee dressing c/d/i. 5/5 in plantarflex/ext b/l  PSYCH: Appropriate    LABS  --------------------------------------------------------------------------------------  Labs:  CAPILLARY BLOOD GLUCOSE                              11.5   14.29 )-----------( 201      ( 10 May 2024 05:30 )             34.4         05-10    140  |  106  |  19  ----------------------------<  129<H>  4.7   |  26  |  0.81      Calcium: 8.9 mg/dL (05-10-24 @ 05:30)      LFTs:         Coags:            Urinalysis Basic - ( 10 May 2024 05:30 )    Color: x / Appearance: x / SG: x / pH: x  Gluc: 129 mg/dL / Ketone: x  / Bili: x / Urobili: x   Blood: x / Protein: x / Nitrite: x   Leuk Esterase: x / RBC: x / WBC x   Sq Epi: x / Non Sq Epi: x / Bacteria: x          --------------------------------------------------------------------------------------    OTHER LABS    IMAGING RESULTS  ****************

## 2024-05-10 NOTE — PROGRESS NOTE ADULT - SUBJECTIVE AND OBJECTIVE BOX
Ortho Note    Pt 69 y/o female S/p RTKA POD#1 comfortable without complaints, pain controlled. Lying in bed. Eating breakfast. No overnight complaints.   Denies CP, SOB, N/V, numbness/tingling     Vital Signs Last 24 Hrs  T(C): 36.8 (05-10-24 @ 08:48), Max: 36.8 (05-10-24 @ 08:48)  T(F): 98.3 (05-10-24 @ 08:48), Max: 98.3 (05-10-24 @ 08:48)  HR: 82 (05-10-24 @ 08:48) (77 - 82)  BP: 106/69 (05-10-24 @ 08:48) (104/68 - 106/69)  BP(mean): --  RR: 18 (05-10-24 @ 08:48) (18 - 18)  SpO2: 95% (05-10-24 @ 08:48) (95% - 96%)  I&O's Summary    09 May 2024 07:01  -  10 May 2024 07:00  --------------------------------------------------------  IN: 100 mL / OUT: 200 mL / NET: -100 mL        General: Pt Alert and oriented, NAD. Calm and pleasant   DSG prevena C/D/I   Pulses: 2+ DP BLE. skin WWP  Sensation: SILT BLE  Motor: EHL/FHL/TA/GS 5/5 strength BLE                          11.5   14.29 )-----------( 201      ( 10 May 2024 05:30 )             34.4     05-10    140  |  106  |  19  ----------------------------<  129<H>  4.7   |  26  |  0.81    Ca    8.9      10 May 2024 05:30        A/P: 68yFemale POD#1 s/p RTKA with Dr. Stephen Hidalgo. Continue to appreciate medicine management  - Pain Control  - DVT ppx: ASA 81 BID  - PT, WBS: WBAT  - OOB for meals as tolerated. IS.    - Bowl regimen  - Dispo; home with home PT pending clearance    Ortho Pager [phone] Ortho Note    Pt 69 y/o female S/p RTKA POD#1 comfortable without complaints, pain controlled. Lying in bed. Eating breakfast. No overnight complaints. Tolerating fluids. Denies bowel movements and Flatulance but denies abdomen tenderness.  Denies CP, SOB, N/V, numbness/tingling     Vital Signs Last 24 Hrs  T(C): 36.8 (05-10-24 @ 08:48), Max: 36.8 (05-10-24 @ 08:48)  T(F): 98.3 (05-10-24 @ 08:48), Max: 98.3 (05-10-24 @ 08:48)  HR: 82 (05-10-24 @ 08:48) (77 - 82)  BP: 106/69 (05-10-24 @ 08:48) (104/68 - 106/69)  BP(mean): --  RR: 18 (05-10-24 @ 08:48) (18 - 18)  SpO2: 95% (05-10-24 @ 08:48) (95% - 96%)  I&O's Summary    09 May 2024 07:01  -  10 May 2024 07:00  --------------------------------------------------------  IN: 100 mL / OUT: 200 mL / NET: -100 mL        General: Pt Alert and oriented, NAD. Calm and pleasant   DSG prevena C/D/I   Pulses: 2+ DP BLE. skin WWP  Sensation: SILT BLE  Motor: EHL/FHL/TA/GS 5/5 strength BLE                          11.5   14.29 )-----------( 201      ( 10 May 2024 05:30 )             34.4     05-10    140  |  106  |  19  ----------------------------<  129<H>  4.7   |  26  |  0.81    Ca    8.9      10 May 2024 05:30        A/P: 68yFemale POD#1 s/p RTKA with Dr. Stephen Hidalgo. Continue to appreciate medicine management  - Pain Control  - DVT ppx: ASA 81 BID  - PT, WBS: WBAT  - OOB for meals as tolerated. IS.    - Bowl regimen  - Dispo; home with home PT pending clearance    Ortho Pager [phone]

## 2024-05-10 NOTE — OCCUPATIONAL THERAPY INITIAL EVALUATION ADULT - DIAGNOSIS, OT EVAL
Pt POD1 admitted for R TKA and presents with impaired balance, decreased strength and activity tolerance.

## 2024-05-10 NOTE — DISCHARGE NOTE PROVIDER - NSDCCPCAREPLAN_GEN_ALL_CORE_FT
PRINCIPAL DISCHARGE DIAGNOSIS  Diagnosis: Osteoarthritis of right knee  Assessment and Plan of Treatment: Right total knee replacement

## 2024-05-10 NOTE — CONSULT NOTE ADULT - ASSESSMENT
68F w HTN, HLD, Obesity, here for elective R TKR w Dr. Mitchell, for HPT    #Post-op state - pain controlled. PPx: ASA 81 BID. On bowel regimen and incentive spirometer  #R Knee OA    - Tylenol 1g q8, celebrex 200 BID.    - PRN: oxycodone 5/10 q3 for mod/severe pain    #Leukocytosis - wbc 14. Afebrile and affects all aspects of care    #HTN - aldactone 50mg daily  #HLD - c/w home statin  #GERD - PPI 40mg daily  #Obesity - BMI 38. Affects all aspects of care    Plan   Doing well. Continue PT  Above d/w Ortho   68F w HTN, HLD, Obesity, here for elective R TKR w Dr. Mitchell, for HPT    #Post-op state - pain controlled. PPx: ASA 81 BID. On bowel regimen and incentive spirometer  #R Knee OA    - Tylenol 1g q8, celebrex 200 BID.    - PRN: oxycodone 5/10 q3 for mod/severe pain    #Leukocytosis - wbc 14. Afebrile and non-toxic appearing. this is likely reactive    #HTN - aldactone 50mg daily  #HLD - c/w home statin  #GERD - PPI 40mg daily  #Obesity - BMI 38. Affects all aspects of care    Plan   Doing well. Continue PT  Above d/w Ortho

## 2024-05-10 NOTE — OCCUPATIONAL THERAPY INITIAL EVALUATION ADULT - PLANNED THERAPY INTERVENTIONS, OT EVAL
ADL retraining/IADL retraining/balance training/bed mobility training/cognitive, visual perceptual/motor coordination training/neuromuscular re-education/ROM/strengthening/stretching/transfer training

## 2024-05-10 NOTE — DISCHARGE NOTE PROVIDER - HOSPITAL COURSE
Admitted 5/9  Surgery right total knee replacement  Rosanne-op Antibiotics: Ancef  Pain control  DVT prophylaxis: ASA 81 mg BID  OOB/Physical Therapy    Admitted 5/9  Surgery right total knee replacement  Rosanne-op Antibiotics: Ancef  Pain control  DVT prophylaxis: ASA 81 mg BID  OOB/Physical Therapy   Cleared physical therapy on 5/13

## 2024-05-10 NOTE — DISCHARGE NOTE PROVIDER - NSDCFUADDINST_GEN_ALL_CORE_FT
Please follow Dr. Mitchell’s instruction sheets    ACTIVITY:   - Weight bear as tolerated with assistive device. No strenuous activity, heavy lifting, driving or returning to work until cleared by MD.   - Apply a cold compress to the surgical site several times daily to reduce pain and swelling. For icing, twenty-minute sessions followed by an hour off is recommended. You should ice as frequently as possible. Ice should NEVER be placed directly on the skin. Wearing compression stockings during the first week after surgery can help reduce swelling in your knee, calf and foot, but is not required.      DO place a pillow under your heel when elevating the leg, to encourage full extension of the knee. Do NOT place a pillow behind your knee for comfort, as this can lead to permanent difficulty straightening your leg. It is normal to develop some swelling in the leg, ankle, and foot because of gravity.     (PREVENA or [place] – incisional wound vac)   - You have an incisional wound vac dressing with tubing to attached canister/battery pack. You may shower but must keep battery pack dry at all times. The battery dies in 7 days then can remove dressing and leave open to air. Keep your incision clean and dry. Do not pick at your incision. Do not apply creams, ointments or oils to your incision until cleared by your surgeon. Do not soak your incision in sitting water (ie tubs, pools, lakes, etc.) until cleared by your surgeon. Do not scrub the incision – instead, allow soap and water to flow over the incision and then pat it dry with a clean towel.     MEDICATION/ANTICOAGULATION:   -You have been prescribed Aspirin, as a preventative to help prevent postoperative blood clots. Please take this medication as prescribed.    - You have been prescribed medications for pain:     - Tylenol for mild to moderate pain. Do not exceed 3,000mg daily.     - For more severe pain, take Tylenol with the addition of narcotic pain medication. Take this medication as prescribed. This medication may cause drowsiness or dizziness. Do not operate machinery. This medication may cause constipation.   - For any additional medications, follow instructions on the bottle.    -Try to have regular bowel movements. Take stool softener or laxative if necessary. You may wish to take Miralax daily until you have regular bowel movements.    - If you have been prescribed Aspirin or an anti-inflammatory, please take Pantoprazole (or similar) once a day, before breakfast, until no longer taking Aspirin or anti-inflammatory. This will help protect your stomach.   - If you have a pain management physician, please follow-up with them postoperatively.    - If you experience any negative side effects of your medications, please call your surgeon's office to discuss.      FOLLOW-UP:   - Call to schedule an appt with Dr. Mitchell for follow up.   - Please follow-up with your primary care physician or any other specialist you see postoperatively, if needed.    - Contact your doctor or go to the emergency room if you experience: fever greater than 101.5, chills, chest pain, difficulty breathing, redness or excessive drainage around the incision, other concerns.     Please follow Dr. Mitchell’s instruction sheets    ACTIVITY:   - Weight bear as tolerated with assistive device. No strenuous activity, heavy lifting, driving or returning to work until cleared by MD.   - Apply a cold compress to the surgical site several times daily to reduce pain and swelling. For icing, twenty-minute sessions followed by an hour off is recommended. You should ice as frequently as possible. Ice should NEVER be placed directly on the skin. Wearing compression stockings during the first week after surgery can help reduce swelling in your knee, calf and foot, but is not required.      DO place a pillow under your heel when elevating the leg, to encourage full extension of the knee. Do NOT place a pillow behind your knee for comfort, as this can lead to permanent difficulty straightening your leg. It is normal to develop some swelling in the leg, ankle, and foot because of gravity.     (PREVENA or CL[place] – incisional wound vac)   - You have an incisional wound vac dressing with tubing to attached canister/battery pack. You may shower but must keep battery pack dry at all times. The battery dies in 7 days then can remove dressing and leave open to air. Keep your incision clean and dry. Do not pick at your incision. Do not apply creams, ointments or oils to your incision until cleared by your surgeon. Do not soak your incision in sitting water (ie tubs, pools, lakes, etc.) until cleared by your surgeon. Do not scrub the incision – instead, allow soap and water to flow over the incision and then pat it dry with a clean towel.     MEDICATION/ANTICOAGULATION:   -You have been prescribed Aspirin, as a preventative to help prevent postoperative blood clots. Please take this medication as prescribed.    - You have been prescribed medications for pain:     - Tylenol for mild to moderate pain. Do not exceed 3,000mg daily.     - For more severe pain, take Tylenol with the addition of narcotic pain medication. Take this medication as prescribed. This medication may cause drowsiness or dizziness. Do not operate machinery. This medication may cause constipation.   - For any additional medications, follow instructions on the bottle.    -Try to have regular bowel movements. Take stool softener or laxative if necessary. You may wish to take Miralax daily until you have regular bowel movements.    - If you have been prescribed Aspirin or an anti-inflammatory, please take Pantoprazole (or similar) once a day, before breakfast, until no longer taking Aspirin or anti-inflammatory. This will help protect your stomach.   - If you have a pain management physician, please follow-up with them postoperatively.    - If you experience any negative side effects of your medications, please call your surgeon's office to discuss.      FOLLOW-UP:   - Call to schedule an appt with Dr. Mitchell for follow up.   - Please follow-up with your primary care physician or any other specialist you see postoperatively, if needed.    - Contact your doctor or go to the emergency room if you experience: fever greater than 101.5, chills, chest pain, difficulty breathing, redness or excessive drainage around the incision, other concerns.  Your meds were sent to [facility]   Please follow Dr. Mitchell’s instruction sheets    ACTIVITY:   - Weight bear as tolerated with assistive device. No strenuous activity, heavy lifting, driving or returning to work until cleared by MD.   - Apply a cold compress to the surgical site several times daily to reduce pain and swelling. For icing, twenty-minute sessions followed by an hour off is recommended. You should ice as frequently as possible. Ice should NEVER be placed directly on the skin. Wearing compression stockings during the first week after surgery can help reduce swelling in your knee, calf and foot, but is not required.      DO place a pillow under your heel when elevating the leg, to encourage full extension of the knee. Do NOT place a pillow behind your knee for comfort, as this can lead to permanent difficulty straightening your leg. It is normal to develop some swelling in the leg, ankle, and foot because of gravity.     (PREVENA  – incisional wound vac)   - You have an incisional wound vac dressing with tubing to attached canister/battery pack. You may shower but must keep battery pack dry at all times. The battery dies in 7 days then can remove dressing and leave open to air. Keep your incision clean and dry. Do not pick at your incision. Do not apply creams, ointments or oils to your incision until cleared by your surgeon. Do not soak your incision in sitting water (ie tubs, pools, lakes, etc.) until cleared by your surgeon. Do not scrub the incision – instead, allow soap and water to flow over the incision and then pat it dry with a clean towel.     MEDICATION/ANTICOAGULATION:   -You have been prescribed Aspirin, as a preventative to help prevent postoperative blood clots. Please take this medication as prescribed.    - You have been prescribed medications for pain:     - Tylenol for mild to moderate pain. Do not exceed 3,000mg daily.     - For more severe pain, take Tylenol with the addition of narcotic pain medication. Take this medication as prescribed. This medication may cause drowsiness or dizziness. Do not operate machinery. This medication may cause constipation.   - For any additional medications, follow instructions on the bottle.    -Try to have regular bowel movements. Take stool softener or laxative if necessary. You may wish to take Miralax daily until you have regular bowel movements.    - If you have been prescribed Aspirin or an anti-inflammatory, please take Pantoprazole (or similar) once a day, before breakfast, until no longer taking Aspirin or anti-inflammatory. This will help protect your stomach.   - If you have a pain management physician, please follow-up with them postoperatively.    - If you experience any negative side effects of your medications, please call your surgeon's office to discuss.      FOLLOW-UP:   - Call to schedule an appt with Dr. Mitchell for follow up.   - Please follow-up with your primary care physician or any other specialist you see postoperatively, if needed.    - Contact your doctor or go to the emergency room if you experience: fever greater than 101.5, chills, chest pain, difficulty breathing, redness or excessive drainage around the incision, other concerns.  Your meds were sent to [facility]

## 2024-05-10 NOTE — OCCUPATIONAL THERAPY INITIAL EVALUATION ADULT - MODIFIED CLINICAL TEST OF SENSORY INTEGRATION IN BALANCE TEST
Pt able to ambulate 15 feet x2 to the toilet with CGA using RW, demonstrating slow gait, difficulty weight-shifting, and decreased step length

## 2024-05-10 NOTE — DISCHARGE NOTE PROVIDER - NSDCMRMEDTOKEN_GEN_ALL_CORE_FT
acetaminophen 500 mg oral tablet: 2 tab(s) orally every 8 hours  aspirin 81 mg oral tablet, chewable: 1 tab(s) orally 2 times a day  atorvastatin 40 mg oral tablet: 1 tab(s) orally once a day (at bedtime)  celecoxib 200 mg oral capsule: 1 cap(s) orally every 12 hours  gabapentin 300 mg oral tablet: orally once a day (at bedtime)  Multiple Vitamins oral tablet: 1 tab(s) orally once a day  oxyCODONE 5 mg oral tablet: 1 tab(s) orally every 3 hours As needed Moderate Pain (4 - 6)  pantoprazole 40 mg oral delayed release tablet: 1 tab(s) orally once a day (before a meal)  spironolactone 50 mg oral tablet: 1 tablet orally once a day . Continue to take your blood pressure at home and follow-up with your primary care provider.  vitamin c: 500 milligram(s) orally once a day   acetaminophen 500 mg oral tablet: 2 tab(s) orally every 8 hours  aspirin 81 mg oral tablet, chewable: 1 tab(s) orally 2 times a day MDD: 2  atorvastatin 40 mg oral tablet: 1 tab(s) orally once a day (at bedtime)  celecoxib 200 mg oral capsule: 1 cap(s) orally every 12 hours MDD: 2  gabapentin 300 mg oral tablet: orally once a day (at bedtime)  Multiple Vitamins oral tablet: 1 tab(s) orally once a day  oxyCODONE 5 mg oral tablet: 1 tab(s) orally every 4 hours as needed for Moderate Pain (4 - 6) MDD: 6  pantoprazole 40 mg oral delayed release tablet: 1 tab(s) orally once a day (before a meal) MDD: 1  spironolactone 50 mg oral tablet: 1 tablet orally once a day . Continue to take your blood pressure at home and follow-up with your primary care provider.  vitamin c: 500 milligram(s) orally once a day   acetaminophen 500 mg oral tablet: 2 tab(s) orally every 8 hours  aspirin 81 mg oral tablet, chewable: 1 tab(s) orally 2 times a day MDD: 2  atorvastatin 40 mg oral tablet: 1 tab(s) orally once a day (at bedtime)  celecoxib 200 mg oral capsule: 1 cap(s) orally every 12 hours MDD: 2  gabapentin 300 mg oral tablet: orally once a day (at bedtime)  MS Contin 15 mg oral tablet, extended release: 1 tab(s) orally every 12 hours as needed for  severe pain MDD: 2  Multiple Vitamins oral tablet: 1 tab(s) orally once a day  oxyCODONE 5 mg oral tablet: 1 tab(s) orally every 4 hours as needed for Moderate Pain (4 - 6) MDD: 6  pantoprazole 40 mg oral delayed release tablet: 1 tab(s) orally once a day (before a meal) MDD: 1  spironolactone 50 mg oral tablet: 1 tablet orally once a day . Continue to take your blood pressure at home and follow-up with your primary care provider.  vitamin c: 500 milligram(s) orally once a day

## 2024-05-11 LAB
ANION GAP SERPL CALC-SCNC: 8 MMOL/L — SIGNIFICANT CHANGE UP (ref 5–17)
BUN SERPL-MCNC: 23 MG/DL — SIGNIFICANT CHANGE UP (ref 7–23)
CALCIUM SERPL-MCNC: 8.5 MG/DL — SIGNIFICANT CHANGE UP (ref 8.4–10.5)
CHLORIDE SERPL-SCNC: 106 MMOL/L — SIGNIFICANT CHANGE UP (ref 96–108)
CO2 SERPL-SCNC: 24 MMOL/L — SIGNIFICANT CHANGE UP (ref 22–31)
CREAT SERPL-MCNC: 0.88 MG/DL — SIGNIFICANT CHANGE UP (ref 0.5–1.3)
EGFR: 72 ML/MIN/1.73M2 — SIGNIFICANT CHANGE UP
GLUCOSE SERPL-MCNC: 94 MG/DL — SIGNIFICANT CHANGE UP (ref 70–99)
HCT VFR BLD CALC: 32.9 % — LOW (ref 34.5–45)
HGB BLD-MCNC: 10.7 G/DL — LOW (ref 11.5–15.5)
MCHC RBC-ENTMCNC: 31.6 PG — SIGNIFICANT CHANGE UP (ref 27–34)
MCHC RBC-ENTMCNC: 32.5 GM/DL — SIGNIFICANT CHANGE UP (ref 32–36)
MCV RBC AUTO: 97.1 FL — SIGNIFICANT CHANGE UP (ref 80–100)
NRBC # BLD: 0 /100 WBCS — SIGNIFICANT CHANGE UP (ref 0–0)
PLATELET # BLD AUTO: 175 K/UL — SIGNIFICANT CHANGE UP (ref 150–400)
POTASSIUM SERPL-MCNC: 4.2 MMOL/L — SIGNIFICANT CHANGE UP (ref 3.5–5.3)
POTASSIUM SERPL-SCNC: 4.2 MMOL/L — SIGNIFICANT CHANGE UP (ref 3.5–5.3)
RBC # BLD: 3.39 M/UL — LOW (ref 3.8–5.2)
RBC # FLD: 14.1 % — SIGNIFICANT CHANGE UP (ref 10.3–14.5)
SODIUM SERPL-SCNC: 138 MMOL/L — SIGNIFICANT CHANGE UP (ref 135–145)
WBC # BLD: 9.23 K/UL — SIGNIFICANT CHANGE UP (ref 3.8–10.5)
WBC # FLD AUTO: 9.23 K/UL — SIGNIFICANT CHANGE UP (ref 3.8–10.5)

## 2024-05-11 PROCEDURE — 99233 SBSQ HOSP IP/OBS HIGH 50: CPT

## 2024-05-11 RX ORDER — PANTOPRAZOLE SODIUM 20 MG/1
1 TABLET, DELAYED RELEASE ORAL
Qty: 30 | Refills: 0
Start: 2024-05-11 | End: 2024-06-09

## 2024-05-11 RX ORDER — BENZOCAINE AND MENTHOL 5; 1 G/100ML; G/100ML
1 LIQUID ORAL EVERY 6 HOURS
Refills: 0 | Status: DISCONTINUED | OUTPATIENT
Start: 2024-05-11 | End: 2024-05-13

## 2024-05-11 RX ORDER — CELECOXIB 200 MG/1
1 CAPSULE ORAL
Qty: 14 | Refills: 0
Start: 2024-05-11 | End: 2024-05-17

## 2024-05-11 RX ORDER — OXYCODONE HYDROCHLORIDE 5 MG/1
1 TABLET ORAL
Qty: 42 | Refills: 0
Start: 2024-05-11 | End: 2024-05-17

## 2024-05-11 RX ORDER — ASPIRIN/CALCIUM CARB/MAGNESIUM 324 MG
1 TABLET ORAL
Qty: 60 | Refills: 0
Start: 2024-05-11 | End: 2024-06-09

## 2024-05-11 RX ADMIN — OXYCODONE HYDROCHLORIDE 10 MILLIGRAM(S): 5 TABLET ORAL at 12:16

## 2024-05-11 RX ADMIN — OXYCODONE HYDROCHLORIDE 10 MILLIGRAM(S): 5 TABLET ORAL at 13:20

## 2024-05-11 RX ADMIN — OXYCODONE HYDROCHLORIDE 10 MILLIGRAM(S): 5 TABLET ORAL at 20:44

## 2024-05-11 RX ADMIN — CELECOXIB 200 MILLIGRAM(S): 200 CAPSULE ORAL at 17:32

## 2024-05-11 RX ADMIN — Medication 1000 MILLIGRAM(S): at 05:20

## 2024-05-11 RX ADMIN — ATORVASTATIN CALCIUM 40 MILLIGRAM(S): 80 TABLET, FILM COATED ORAL at 22:01

## 2024-05-11 RX ADMIN — CELECOXIB 200 MILLIGRAM(S): 200 CAPSULE ORAL at 17:23

## 2024-05-11 RX ADMIN — ONDANSETRON 4 MILLIGRAM(S): 8 TABLET, FILM COATED ORAL at 15:34

## 2024-05-11 RX ADMIN — Medication 81 MILLIGRAM(S): at 05:19

## 2024-05-11 RX ADMIN — CELECOXIB 200 MILLIGRAM(S): 200 CAPSULE ORAL at 06:20

## 2024-05-11 RX ADMIN — PANTOPRAZOLE SODIUM 40 MILLIGRAM(S): 20 TABLET, DELAYED RELEASE ORAL at 05:20

## 2024-05-11 RX ADMIN — Medication 81 MILLIGRAM(S): at 17:23

## 2024-05-11 RX ADMIN — Medication 1000 MILLIGRAM(S): at 06:20

## 2024-05-11 RX ADMIN — SPIRONOLACTONE 50 MILLIGRAM(S): 25 TABLET, FILM COATED ORAL at 12:11

## 2024-05-11 RX ADMIN — SENNA PLUS 2 TABLET(S): 8.6 TABLET ORAL at 22:01

## 2024-05-11 RX ADMIN — Medication 1000 MILLIGRAM(S): at 22:01

## 2024-05-11 RX ADMIN — GABAPENTIN 300 MILLIGRAM(S): 400 CAPSULE ORAL at 22:01

## 2024-05-11 RX ADMIN — OXYCODONE HYDROCHLORIDE 10 MILLIGRAM(S): 5 TABLET ORAL at 21:44

## 2024-05-11 RX ADMIN — CELECOXIB 200 MILLIGRAM(S): 200 CAPSULE ORAL at 05:20

## 2024-05-11 RX ADMIN — POLYETHYLENE GLYCOL 3350 17 GRAM(S): 17 POWDER, FOR SOLUTION ORAL at 12:11

## 2024-05-11 RX ADMIN — Medication 1000 MILLIGRAM(S): at 23:01

## 2024-05-11 RX ADMIN — Medication 1 TABLET(S): at 12:11

## 2024-05-11 NOTE — PROGRESS NOTE ADULT - SUBJECTIVE AND OBJECTIVE BOX
Ortho Note    Pt comfortable without complaints, pain controlled  Denies CP, SOB, N/V, numbness/tingling     Vital Signs Last 24 Hrs  T(C): 36.7 (05-11-24 @ 16:30), Max: 36.7 (05-11-24 @ 16:30)  T(F): 98.1 (05-11-24 @ 16:30), Max: 98.1 (05-11-24 @ 16:30)  HR: 93 (05-11-24 @ 16:30) (88 - 93)  BP: 121/74 (05-11-24 @ 16:30) (113/74 - 129/70)  BP(mean): --  RR: 18 (05-11-24 @ 16:30) (18 - 18)  SpO2: 93% (05-11-24 @ 16:30) (93% - 93%)  I&O's Summary    10 May 2024 07:01  -  11 May 2024 07:00  --------------------------------------------------------  IN: 900 mL / OUT: 0 mL / NET: 900 mL        General: Pt Alert and oriented, NAD. Calm and pleasant   DSG prevena C/D/I   Pulses: 2+ DP BLE. skin WWP  Sensation: SILT BLE  Motor: EHL/FHL/TA/GS 5/5 strength BLE                          10.7   9.23  )-----------( 175      ( 11 May 2024 07:45 )             32.9     05-11    138  |  106  |  23  ----------------------------<  94  4.2   |  24  |  0.88    Ca    8.5      11 May 2024 07:45        A/P: 68yFemale POD#2 s/p RTKA with Dr. Stephen Hidalgo. Continue to appreciate medicine management  - Pain Control  - DVT ppx: ASA 81 BID  - PT, WBS: WBAT  - OOB for meals as tolerated. IS.    - Bowl regimen  - Dispo; home with home PT pending clearance    Ortho Pager [phone]

## 2024-05-11 NOTE — PROGRESS NOTE ADULT - SUBJECTIVE AND OBJECTIVE BOX
INTERVAL HPI/OVERNIGHT EVENTS: pinky o/n    SUBJECTIVE: Patient seen and examined at bedside.   reports she did 2 sessions w PT and that pain is improving. Requesting a lozenge for sore throat. Swallowing food/liquids wo issues. No cough, dyspnea, chest pain. No LH/dizziness, fever, dysuria. +flatus.   Sat 89% at night on room air while sleeping -- 96% awake on room air    OBJECTIVE:    VITAL SIGNS:  ICU Vital Signs Last 24 Hrs  T(C): 36.4 (11 May 2024 08:52), Max: 36.8 (10 May 2024 17:03)  T(F): 97.6 (11 May 2024 08:52), Max: 98.2 (10 May 2024 17:03)  HR: 73 (11 May 2024 08:52) (65 - 86)  BP: 113/78 (11 May 2024 08:52) (99/55 - 131/88)  BP(mean): --  ABP: --  ABP(mean): --  RR: 18 (11 May 2024 08:52) (18 - 20)  SpO2: 97% (11 May 2024 08:52) (91% - 97%)    O2 Parameters below as of 11 May 2024 08:52  Patient On (Oxygen Delivery Method): room air              05-10 @ 07:01  -  05-11 @ 07:00  --------------------------------------------------------  IN: 900 mL / OUT: 0 mL / NET: 900 mL      CAPILLARY BLOOD GLUCOSE          PHYSICAL EXAM:  GEN: female in NAD on RA  HEENT: NC/AT, MMM  CV: RRR, nml S1S2, no murmurs  PULM: nml effort, CTAB  ABD: Soft, non-distended, NABS, non-tender  NEURO  A/O x3, moving all extremities, Sensation intact  R knee dressing c/d/i. 5/5 in plantarflex/ext b/l  PSYCH: Appropriate    MEDICATIONS:  MEDICATIONS  (STANDING):  acetaminophen     Tablet .. 1000 milliGRAM(s) Oral every 8 hours  aspirin  chewable 81 milliGRAM(s) Oral two times a day  atorvastatin 40 milliGRAM(s) Oral at bedtime  celecoxib 200 milliGRAM(s) Oral every 12 hours  gabapentin 300 milliGRAM(s) Oral at bedtime  lactated ringers. 1000 milliLiter(s) (100 mL/Hr) IV Continuous <Continuous>  multivitamin 1 Tablet(s) Oral daily  pantoprazole    Tablet 40 milliGRAM(s) Oral before breakfast  polyethylene glycol 3350 17 Gram(s) Oral daily  senna 2 Tablet(s) Oral at bedtime  spironolactone 50 milliGRAM(s) Oral daily    MEDICATIONS  (PRN):  benzocaine/menthol Lozenge 1 Lozenge Oral every 6 hours PRN Sore Throat  HYDROmorphone  Injectable 0.5 milliGRAM(s) IV Push every 15 minutes PRN Breakthrough pain pacu only  HYDROmorphone  Injectable 0.5 milliGRAM(s) IV Push every 3 hours PRN Breakthrough pain  ondansetron Injectable 4 milliGRAM(s) IV Push every 6 hours PRN Nausea and/or Vomiting  oxyCODONE    IR 5 milliGRAM(s) Oral every 3 hours PRN Moderate Pain (4 - 6)  oxyCODONE    IR 10 milliGRAM(s) Oral every 3 hours PRN Severe Pain (7 - 10)      ALLERGIES:  Allergies    latex (Rash)  penicillin (Rash)    Intolerances        LABS:                        10.7   9.23  )-----------( 175      ( 11 May 2024 07:45 )             32.9     05-11    138  |  106  |  23  ----------------------------<  94  4.2   |  24  |  0.88    Ca    8.5      11 May 2024 07:45        Urinalysis Basic - ( 11 May 2024 07:45 )    Color: x / Appearance: x / SG: x / pH: x  Gluc: 94 mg/dL / Ketone: x  / Bili: x / Urobili: x   Blood: x / Protein: x / Nitrite: x   Leuk Esterase: x / RBC: x / WBC x   Sq Epi: x / Non Sq Epi: x / Bacteria: x        RADIOLOGY & ADDITIONAL TESTS: Reviewed.

## 2024-05-12 LAB
ANION GAP SERPL CALC-SCNC: 8 MMOL/L — SIGNIFICANT CHANGE UP (ref 5–17)
BUN SERPL-MCNC: 15 MG/DL — SIGNIFICANT CHANGE UP (ref 7–23)
CALCIUM SERPL-MCNC: 8.9 MG/DL — SIGNIFICANT CHANGE UP (ref 8.4–10.5)
CHLORIDE SERPL-SCNC: 106 MMOL/L — SIGNIFICANT CHANGE UP (ref 96–108)
CO2 SERPL-SCNC: 27 MMOL/L — SIGNIFICANT CHANGE UP (ref 22–31)
CREAT SERPL-MCNC: 0.77 MG/DL — SIGNIFICANT CHANGE UP (ref 0.5–1.3)
EGFR: 84 ML/MIN/1.73M2 — SIGNIFICANT CHANGE UP
GLUCOSE SERPL-MCNC: 83 MG/DL — SIGNIFICANT CHANGE UP (ref 70–99)
HCT VFR BLD CALC: 35.2 % — SIGNIFICANT CHANGE UP (ref 34.5–45)
HGB BLD-MCNC: 10.9 G/DL — LOW (ref 11.5–15.5)
MCHC RBC-ENTMCNC: 31 GM/DL — LOW (ref 32–36)
MCHC RBC-ENTMCNC: 31.1 PG — SIGNIFICANT CHANGE UP (ref 27–34)
MCV RBC AUTO: 100.3 FL — HIGH (ref 80–100)
NRBC # BLD: 0 /100 WBCS — SIGNIFICANT CHANGE UP (ref 0–0)
PLATELET # BLD AUTO: 186 K/UL — SIGNIFICANT CHANGE UP (ref 150–400)
POTASSIUM SERPL-MCNC: 4.4 MMOL/L — SIGNIFICANT CHANGE UP (ref 3.5–5.3)
POTASSIUM SERPL-SCNC: 4.4 MMOL/L — SIGNIFICANT CHANGE UP (ref 3.5–5.3)
RBC # BLD: 3.51 M/UL — LOW (ref 3.8–5.2)
RBC # FLD: 14 % — SIGNIFICANT CHANGE UP (ref 10.3–14.5)
SODIUM SERPL-SCNC: 141 MMOL/L — SIGNIFICANT CHANGE UP (ref 135–145)
WBC # BLD: 9.57 K/UL — SIGNIFICANT CHANGE UP (ref 3.8–10.5)
WBC # FLD AUTO: 9.57 K/UL — SIGNIFICANT CHANGE UP (ref 3.8–10.5)

## 2024-05-12 PROCEDURE — 99232 SBSQ HOSP IP/OBS MODERATE 35: CPT

## 2024-05-12 RX ADMIN — Medication 1000 MILLIGRAM(S): at 13:38

## 2024-05-12 RX ADMIN — OXYCODONE HYDROCHLORIDE 5 MILLIGRAM(S): 5 TABLET ORAL at 22:57

## 2024-05-12 RX ADMIN — OXYCODONE HYDROCHLORIDE 5 MILLIGRAM(S): 5 TABLET ORAL at 21:57

## 2024-05-12 RX ADMIN — Medication 1 TABLET(S): at 12:49

## 2024-05-12 RX ADMIN — ATORVASTATIN CALCIUM 40 MILLIGRAM(S): 80 TABLET, FILM COATED ORAL at 21:50

## 2024-05-12 RX ADMIN — POLYETHYLENE GLYCOL 3350 17 GRAM(S): 17 POWDER, FOR SOLUTION ORAL at 12:49

## 2024-05-12 RX ADMIN — GABAPENTIN 300 MILLIGRAM(S): 400 CAPSULE ORAL at 21:50

## 2024-05-12 RX ADMIN — CELECOXIB 200 MILLIGRAM(S): 200 CAPSULE ORAL at 17:46

## 2024-05-12 RX ADMIN — Medication 81 MILLIGRAM(S): at 17:46

## 2024-05-12 RX ADMIN — Medication 81 MILLIGRAM(S): at 05:41

## 2024-05-12 RX ADMIN — CELECOXIB 200 MILLIGRAM(S): 200 CAPSULE ORAL at 06:41

## 2024-05-12 RX ADMIN — Medication 1000 MILLIGRAM(S): at 05:41

## 2024-05-12 RX ADMIN — PANTOPRAZOLE SODIUM 40 MILLIGRAM(S): 20 TABLET, DELAYED RELEASE ORAL at 05:41

## 2024-05-12 RX ADMIN — Medication 1000 MILLIGRAM(S): at 06:41

## 2024-05-12 RX ADMIN — Medication 1000 MILLIGRAM(S): at 22:50

## 2024-05-12 RX ADMIN — CELECOXIB 200 MILLIGRAM(S): 200 CAPSULE ORAL at 05:41

## 2024-05-12 RX ADMIN — Medication 1000 MILLIGRAM(S): at 21:50

## 2024-05-12 NOTE — PROGRESS NOTE ADULT - SUBJECTIVE AND OBJECTIVE BOX
Ortho Note    Pt comfortable without complaints, pain controlled  Denies CP, SOB, N/V, numbness/tingling     Vital Signs Last 24 Hrs  T(C): 36.6 (05-12-24 @ 05:09), Max: 36.6 (05-12-24 @ 05:09)  T(F): 97.8 (05-12-24 @ 05:09), Max: 97.8 (05-12-24 @ 05:09)  HR: 82 (05-12-24 @ 05:09) (82 - 82)  BP: 115/72 (05-12-24 @ 05:09) (115/72 - 115/72)  BP(mean): --  RR: 16 (05-12-24 @ 05:09) (16 - 16)  SpO2: 97% (05-12-24 @ 05:09) (97% - 97%)  I&O's Summary      General: Pt Alert and oriented, NAD. Calm and pleasant   DSG prevena C/D/I   Pulses: 2+ DP BLE. skin WWP  Sensation: SILT BLE  Motor: EHL/FHL/TA/GS 5/5 strength BLE                          10.9   9.57  )-----------( 186      ( 12 May 2024 06:58 )             35.2     05-12    141  |  106  |  15  ----------------------------<  83  4.4   |  27  |  0.77    Ca    8.9      12 May 2024 06:58        A/P: 68yFemale POD#3 s/p RTKA with Dr. Stephen Hidalgo. Continue to appreciate medicine management  - Pain Control  - DVT ppx: ASA 81 BID  - PT, WBS: WBAT  - OOB for meals as tolerated. IS.    - Bowl regimen  - Dispo; home with home PT pending clearance    Ortho Pager [phone]

## 2024-05-12 NOTE — PROGRESS NOTE ADULT - SUBJECTIVE AND OBJECTIVE BOX
INTERVAL HPI/OVERNIGHT EVENTS: pinky o/n    SUBJECTIVE: Patient seen and examined at bedside.   Reports feeling well - pain is controlled. No LH/dizziness, chest pain, dyspnea. No dysuria. +flatus, no N/V/Abd pain. No fevers.    OBJECTIVE:    VITAL SIGNS:  ICU Vital Signs Last 24 Hrs  T(C): 37.1 (12 May 2024 09:08), Max: 37.1 (12 May 2024 09:08)  T(F): 98.8 (12 May 2024 09:08), Max: 98.8 (12 May 2024 09:08)  HR: 86 (12 May 2024 09:08) (82 - 93)  BP: 107/69 (12 May 2024 09:08) (107/69 - 129/70)  BP(mean): --  ABP: --  ABP(mean): --  RR: 17 (12 May 2024 09:08) (16 - 18)  SpO2: 92% (12 May 2024 09:08) (92% - 97%)    O2 Parameters below as of 12 May 2024 09:08  Patient On (Oxygen Delivery Method): room air              CAPILLARY BLOOD GLUCOSE          PHYSICAL EXAM:  GEN: female in NAD on RA  HEENT: NC/AT, MMM  CV: RRR, nml S1S2, no murmurs  PULM: nml effort, CTAB  ABD: Soft, non-distended, NABS, non-tender  NEURO  A/O x3, moving all extremities, Sensation intact  R knee dressing c/d/i. 5/5 in plantarflex/ext b/l  PSYCH: Appropriate    MEDICATIONS:  MEDICATIONS  (STANDING):  acetaminophen     Tablet .. 1000 milliGRAM(s) Oral every 8 hours  aspirin  chewable 81 milliGRAM(s) Oral two times a day  atorvastatin 40 milliGRAM(s) Oral at bedtime  celecoxib 200 milliGRAM(s) Oral every 12 hours  gabapentin 300 milliGRAM(s) Oral at bedtime  lactated ringers. 1000 milliLiter(s) (100 mL/Hr) IV Continuous <Continuous>  multivitamin 1 Tablet(s) Oral daily  pantoprazole    Tablet 40 milliGRAM(s) Oral before breakfast  polyethylene glycol 3350 17 Gram(s) Oral daily  senna 2 Tablet(s) Oral at bedtime  spironolactone 50 milliGRAM(s) Oral daily    MEDICATIONS  (PRN):  benzocaine/menthol Lozenge 1 Lozenge Oral every 6 hours PRN Sore Throat  HYDROmorphone  Injectable 0.5 milliGRAM(s) IV Push every 15 minutes PRN Breakthrough pain pacu only  HYDROmorphone  Injectable 0.5 milliGRAM(s) IV Push every 3 hours PRN Breakthrough pain  ondansetron Injectable 4 milliGRAM(s) IV Push every 6 hours PRN Nausea and/or Vomiting  oxyCODONE    IR 5 milliGRAM(s) Oral every 3 hours PRN Moderate Pain (4 - 6)  oxyCODONE    IR 10 milliGRAM(s) Oral every 3 hours PRN Severe Pain (7 - 10)      ALLERGIES:  Allergies    latex (Rash)  penicillin (Rash)    Intolerances        LABS:                        10.9   9.57  )-----------( 186      ( 12 May 2024 06:58 )             35.2     05-12    141  |  106  |  15  ----------------------------<  83  4.4   |  27  |  0.77    Ca    8.9      12 May 2024 06:58        Urinalysis Basic - ( 12 May 2024 06:58 )    Color: x / Appearance: x / SG: x / pH: x  Gluc: 83 mg/dL / Ketone: x  / Bili: x / Urobili: x   Blood: x / Protein: x / Nitrite: x   Leuk Esterase: x / RBC: x / WBC x   Sq Epi: x / Non Sq Epi: x / Bacteria: x        RADIOLOGY & ADDITIONAL TESTS: Reviewed.

## 2024-05-13 VITALS — SYSTOLIC BLOOD PRESSURE: 120 MMHG | DIASTOLIC BLOOD PRESSURE: 75 MMHG | HEART RATE: 89 BPM

## 2024-05-13 LAB
ANION GAP SERPL CALC-SCNC: 8 MMOL/L — SIGNIFICANT CHANGE UP (ref 5–17)
BUN SERPL-MCNC: 13 MG/DL — SIGNIFICANT CHANGE UP (ref 7–23)
CALCIUM SERPL-MCNC: 8.7 MG/DL — SIGNIFICANT CHANGE UP (ref 8.4–10.5)
CHLORIDE SERPL-SCNC: 106 MMOL/L — SIGNIFICANT CHANGE UP (ref 96–108)
CO2 SERPL-SCNC: 28 MMOL/L — SIGNIFICANT CHANGE UP (ref 22–31)
CREAT SERPL-MCNC: 0.66 MG/DL — SIGNIFICANT CHANGE UP (ref 0.5–1.3)
EGFR: 96 ML/MIN/1.73M2 — SIGNIFICANT CHANGE UP
GLUCOSE SERPL-MCNC: 87 MG/DL — SIGNIFICANT CHANGE UP (ref 70–99)
HCT VFR BLD CALC: 31.3 % — LOW (ref 34.5–45)
HGB BLD-MCNC: 10 G/DL — LOW (ref 11.5–15.5)
MCHC RBC-ENTMCNC: 31.3 PG — SIGNIFICANT CHANGE UP (ref 27–34)
MCHC RBC-ENTMCNC: 31.9 GM/DL — LOW (ref 32–36)
MCV RBC AUTO: 98.1 FL — SIGNIFICANT CHANGE UP (ref 80–100)
NRBC # BLD: 0 /100 WBCS — SIGNIFICANT CHANGE UP (ref 0–0)
PLATELET # BLD AUTO: 187 K/UL — SIGNIFICANT CHANGE UP (ref 150–400)
POTASSIUM SERPL-MCNC: 4.3 MMOL/L — SIGNIFICANT CHANGE UP (ref 3.5–5.3)
POTASSIUM SERPL-SCNC: 4.3 MMOL/L — SIGNIFICANT CHANGE UP (ref 3.5–5.3)
RBC # BLD: 3.19 M/UL — LOW (ref 3.8–5.2)
RBC # FLD: 14.1 % — SIGNIFICANT CHANGE UP (ref 10.3–14.5)
SODIUM SERPL-SCNC: 142 MMOL/L — SIGNIFICANT CHANGE UP (ref 135–145)
WBC # BLD: 8.56 K/UL — SIGNIFICANT CHANGE UP (ref 3.8–10.5)
WBC # FLD AUTO: 8.56 K/UL — SIGNIFICANT CHANGE UP (ref 3.8–10.5)

## 2024-05-13 PROCEDURE — S2900: CPT

## 2024-05-13 PROCEDURE — C1776: CPT

## 2024-05-13 PROCEDURE — 97116 GAIT TRAINING THERAPY: CPT

## 2024-05-13 PROCEDURE — 36415 COLL VENOUS BLD VENIPUNCTURE: CPT

## 2024-05-13 PROCEDURE — 97165 OT EVAL LOW COMPLEX 30 MIN: CPT

## 2024-05-13 PROCEDURE — 80048 BASIC METABOLIC PNL TOTAL CA: CPT

## 2024-05-13 PROCEDURE — 73560 X-RAY EXAM OF KNEE 1 OR 2: CPT

## 2024-05-13 PROCEDURE — 97530 THERAPEUTIC ACTIVITIES: CPT

## 2024-05-13 PROCEDURE — 97110 THERAPEUTIC EXERCISES: CPT

## 2024-05-13 PROCEDURE — 97161 PT EVAL LOW COMPLEX 20 MIN: CPT

## 2024-05-13 PROCEDURE — 99232 SBSQ HOSP IP/OBS MODERATE 35: CPT

## 2024-05-13 PROCEDURE — 85027 COMPLETE CBC AUTOMATED: CPT

## 2024-05-13 PROCEDURE — C1713: CPT

## 2024-05-13 PROCEDURE — C1889: CPT

## 2024-05-13 RX ORDER — MORPHINE SULFATE 50 MG/1
1 CAPSULE, EXTENDED RELEASE ORAL
Qty: 14 | Refills: 0
Start: 2024-05-13 | End: 2024-05-19

## 2024-05-13 RX ADMIN — CELECOXIB 200 MILLIGRAM(S): 200 CAPSULE ORAL at 06:04

## 2024-05-13 RX ADMIN — PANTOPRAZOLE SODIUM 40 MILLIGRAM(S): 20 TABLET, DELAYED RELEASE ORAL at 06:05

## 2024-05-13 RX ADMIN — Medication 1 TABLET(S): at 11:49

## 2024-05-13 RX ADMIN — POLYETHYLENE GLYCOL 3350 17 GRAM(S): 17 POWDER, FOR SOLUTION ORAL at 11:50

## 2024-05-13 RX ADMIN — CELECOXIB 200 MILLIGRAM(S): 200 CAPSULE ORAL at 07:05

## 2024-05-13 RX ADMIN — Medication 1000 MILLIGRAM(S): at 06:04

## 2024-05-13 RX ADMIN — OXYCODONE HYDROCHLORIDE 5 MILLIGRAM(S): 5 TABLET ORAL at 11:17

## 2024-05-13 RX ADMIN — Medication 1000 MILLIGRAM(S): at 07:05

## 2024-05-13 RX ADMIN — Medication 81 MILLIGRAM(S): at 06:04

## 2024-05-13 RX ADMIN — OXYCODONE HYDROCHLORIDE 5 MILLIGRAM(S): 5 TABLET ORAL at 12:17

## 2024-05-13 RX ADMIN — SPIRONOLACTONE 50 MILLIGRAM(S): 25 TABLET, FILM COATED ORAL at 12:02

## 2024-05-13 NOTE — PROGRESS NOTE ADULT - REASON FOR ADMISSION
Right knee pain

## 2024-05-13 NOTE — PROGRESS NOTE ADULT - SUBJECTIVE AND OBJECTIVE BOX
Ortho Note    Pt seen and examined at bedside. Patient comfortable without complaints, pain controlled. Tolerating PO diet. Voiding freely. States she has been doing well with PT and has been walking the halls. Last BM yesterday.  Denies CP, SOB, N/V, new numbness/tingling     Vital Signs Last 24 Hrs  T(C): 36.6 (05-13-24 @ 05:14), Max: 36.6 (05-13-24 @ 05:14)  T(F): 97.9 (05-13-24 @ 05:14), Max: 97.9 (05-13-24 @ 05:14)  HR: 87 (05-13-24 @ 05:14) (87 - 87)  BP: 122/75 (05-13-24 @ 05:14) (122/75 - 122/75)  BP(mean): --  RR: 16 (05-13-24 @ 05:14) (16 - 16)  SpO2: 94% (05-13-24 @ 05:14) (94% - 94%)  I&O's Summary      General: Pt Alert and oriented, NAD  DSG C/D/I- prevena holding suction  Pulses: 2+ DP bilaterally  Calves soft, nontender bilaterally  Sensation: SILT distally bilateral lower extremities  Motor:   EHL/FHL/TA/GS: 5/5 bilaterally                          10.0   8.56  )-----------( 187      ( 13 May 2024 05:30 )             31.3     05-13    142  |  106  |  13  ----------------------------<  87  4.3   |  28  |  0.66    Ca    8.7      13 May 2024 05:30        A/P: 68yFemale POD #4 s/p right total knee replacement with Dr. Mitchell  - Stable, labs and vitals reviewed  - Appreciate medicine recs  - Pain Control  - Continue with bowel regimen  - DVT ppx: SCDs, aspirin 81mg oral twice a day  - PT, WBS: WBAT  - Dispo: home pending PT clearance  Ortho Pager [phone]

## 2024-05-13 NOTE — PROGRESS NOTE ADULT - PROVIDER SPECIALTY LIST ADULT
Hospitalist
Hospitalist
Orthopedics
Hospitalist
Orthopedics

## 2024-05-13 NOTE — PROGRESS NOTE ADULT - SUBJECTIVE AND OBJECTIVE BOX
Ortho Note    Pt comfortable without complaints, pain controlled  Denies CP, SOB, N/V, numbness/tingling     Vital Signs Last 24 Hrs  T(C): 36.6 (13 May 2024 05:14), Max: 37.6 (12 May 2024 15:34)  T(F): 97.9 (13 May 2024 05:14), Max: 99.6 (12 May 2024 15:34)  HR: 87 (13 May 2024 05:14) (85 - 101)  BP: 122/75 (13 May 2024 05:14) (107/68 - 123/87)  BP(mean): --  RR: 16 (13 May 2024 05:14) (16 - 18)  SpO2: 94% (13 May 2024 05:14) (91% - 94%)    Parameters below as of 13 May 2024 05:14  Patient On (Oxygen Delivery Method): room air    General: Pt Alert and oriented, NAD.  DSG prevena C/D/I   Pulses: 2+ DP BLE. skin WWP  Sensation: SILT BLE  Motor: EHL/FHL/TA/GS 5/5 strength BLE                   A/P: 68yFemale s/p R TKA with Dr. Mitchell on 5/9  - Stable. Continue to appreciate medicine management  - Pain Control  - DVT ppx: ASA 81 BID  - PT, WBS: WBAT  - Dispo; home with home PT pending clearance    Ortho Pager [phone]

## 2024-05-13 NOTE — PROGRESS NOTE ADULT - ASSESSMENT
68F w HTN, HLD, Obesity, here for elective R TKR w Dr. Mitchell, for HPT    #Post-op state - pain controlled. PPx: as per primary. On bowel regimen and incentive spirometer  #R Knee OA    - pain control prn.     #Leukocytosis - Resolved  #Acute blood loss anemia - 10.9 from 10.7 from 11.5. Stable.     #HTN - aldactone 50mg daily  #HLD - c/w home statin  #GERD - PPI 40mg daily  #Obesity - BMI 38. Affects all aspects of care. Discussed lifestyle modifications.    
68F w HTN, HLD, Obesity, here for elective R TKR w Dr. Mitchell, for HPT    #Post-op state - pain controlled. PPx: ASA 81 BID. On bowel regimen and incentive spirometer  #R Knee OA    - Tylenol 1g q8, celebrex 200 BID.    - PRN: oxycodone 5/10 q3 for mod/severe pain    #Leukocytosis - Resolved  #Acute blood loss anemia - 10.9 from 10.7 from 11.5. Asymptomatic    #HTN - aldactone 50mg daily  #HLD - c/w home statin  #GERD - PPI 40mg daily  #Obesity - BMI 38. Affects all aspects of care    Plan   Doing well. Continue PT    HPT - pending clearing PT  Above d/w Ortho
68F w HTN, HLD, Obesity, here for elective R TKR w Dr. Mitchell, for HPT    #Post-op state - pain controlled. PPx: ASA 81 BID. On bowel regimen and incentive spirometer  #R Knee OA    - Tylenol 1g q8, celebrex 200 BID.    - PRN: oxycodone 5/10 q3 for mod/severe pain    #Leukocytosis - Resolved  #Acute blood loss anemia - 10.7 from 11.5. Asymptomatic    #HTN - aldactone 50mg daily  #HLD - c/w home statin  #GERD - PPI 40mg daily  #Obesity - BMI 38. Affects all aspects of care    Plan   Doing well. Continue PT  Consider throat lozenge PRN - likely d/t irritation from intubation/airway    HPT - pending clearing PT  Above d/w Ortho

## 2024-05-13 NOTE — PROGRESS NOTE ADULT - SUBJECTIVE AND OBJECTIVE BOX
S: patient seen bedside. Feels very well this morning. No acute complaints.   ROS otherwise negative    Vital Signs Last 24 Hrs  T(C): 36.8 (13 May 2024 08:22), Max: 37.6 (12 May 2024 15:34)  T(F): 98.2 (13 May 2024 08:22), Max: 99.6 (12 May 2024 15:34)  HR: 87 (13 May 2024 08:22) (85 - 96)  BP: 125/80 (13 May 2024 08:22) (107/68 - 125/80)  BP(mean): --  RR: 17 (13 May 2024 08:22) (16 - 18)  SpO2: 95% (13 May 2024 08:22) (91% - 95%)    Parameters below as of 13 May 2024 08:22  Patient On (Oxygen Delivery Method): room air        PHYSICAL EXAM:  GEN: female in NAD on RA  HEENT: NC/AT, MMM  CV: RRR, nml S1S2, no murmurs  PULM: nml effort, CTAB  ABD: Soft, non-distended, NABS, non-tender  NEURO  A/O x3, moving all extremities, Sensation intact  R knee dressing c/d/i. 5/5 in plantarflex/ext b/l  PSYCH: Appropriate                            10.0   8.56  )-----------( 187      ( 13 May 2024 05:30 )             31.3   05-13    142  |  106  |  13  ----------------------------<  87  4.3   |  28  |  0.66    Ca    8.7      13 May 2024 05:30

## 2024-05-16 ENCOUNTER — NON-APPOINTMENT (OUTPATIENT)
Age: 69
End: 2024-05-16

## 2024-05-16 PROBLEM — I10 BENIGN HYPERTENSION: Status: ACTIVE | Noted: 2024-05-16

## 2024-05-16 PROBLEM — Z47.1 AFTERCARE FOLLOWING LEFT HIP JOINT REPLACEMENT SURGERY: Status: ACTIVE | Noted: 2019-09-24

## 2024-05-16 PROBLEM — Z47.1 AFTERCARE FOLLOWING LEFT KNEE JOINT REPLACEMENT SURGERY: Status: ACTIVE | Noted: 2023-06-21

## 2024-05-16 PROBLEM — M43.17 ACQUIRED SPONDYLOLISTHESIS OF LUMBOSACRAL REGION: Status: ACTIVE | Noted: 2018-06-22

## 2024-05-16 PROBLEM — E78.2 HYPERLIPIDEMIA, MIXED: Status: ACTIVE | Noted: 2024-05-16

## 2024-05-16 PROBLEM — Z71.89 ADVANCE CARE PLANNING: Status: ACTIVE | Noted: 2024-05-16

## 2024-05-16 PROBLEM — K21.9 GASTROESOPHAGEAL REFLUX DISEASE WITHOUT ESOPHAGITIS: Status: ACTIVE | Noted: 2024-05-16

## 2024-05-16 RX ORDER — CEPHALEXIN 500 MG/1
500 CAPSULE ORAL
Qty: 14 | Refills: 0 | Status: DISCONTINUED | COMMUNITY
Start: 2021-05-18 | End: 2024-05-16

## 2024-05-16 RX ORDER — CIPROFLOXACIN HYDROCHLORIDE 500 MG/1
500 TABLET, FILM COATED ORAL
Qty: 10 | Refills: 0 | Status: DISCONTINUED | COMMUNITY
Start: 2021-04-09 | End: 2024-05-16

## 2024-05-16 RX ORDER — CLINDAMYCIN HYDROCHLORIDE 300 MG/1
300 CAPSULE ORAL
Qty: 2 | Refills: 0 | Status: DISCONTINUED | COMMUNITY
Start: 2021-06-02 | End: 2024-05-16

## 2024-05-17 ENCOUNTER — APPOINTMENT (OUTPATIENT)
Dept: HOME HEALTH SERVICES | Facility: HOME HEALTH | Age: 69
End: 2024-05-17
Payer: MEDICARE

## 2024-05-17 VITALS
OXYGEN SATURATION: 96 % | HEART RATE: 96 BPM | RESPIRATION RATE: 18 BRPM | DIASTOLIC BLOOD PRESSURE: 70 MMHG | TEMPERATURE: 206.6 F | SYSTOLIC BLOOD PRESSURE: 120 MMHG

## 2024-05-17 DIAGNOSIS — K21.9 GASTRO-ESOPHAGEAL REFLUX DISEASE W/OUT ESOPHAGITIS: ICD-10-CM

## 2024-05-17 DIAGNOSIS — Z71.89 OTHER SPECIFIED COUNSELING: ICD-10-CM

## 2024-05-17 DIAGNOSIS — E66.9 OBESITY, UNSPECIFIED: ICD-10-CM

## 2024-05-17 DIAGNOSIS — Z96.642 AFTERCARE FOLLOWING JOINT REPLACEMENT SURGERY: ICD-10-CM

## 2024-05-17 DIAGNOSIS — Z96.652 AFTERCARE FOLLOWING JOINT REPLACEMENT SURGERY: ICD-10-CM

## 2024-05-17 DIAGNOSIS — Z47.1 AFTERCARE FOLLOWING JOINT REPLACEMENT SURGERY: ICD-10-CM

## 2024-05-17 DIAGNOSIS — M43.17 SPONDYLOLISTHESIS, LUMBOSACRAL REGION: ICD-10-CM

## 2024-05-17 DIAGNOSIS — I10 ESSENTIAL (PRIMARY) HYPERTENSION: ICD-10-CM

## 2024-05-17 DIAGNOSIS — E78.2 MIXED HYPERLIPIDEMIA: ICD-10-CM

## 2024-05-17 DIAGNOSIS — Z96.641 AFTERCARE FOLLOWING JOINT REPLACEMENT SURGERY: ICD-10-CM

## 2024-05-17 PROCEDURE — 99344 HOME/RES VST NEW MOD MDM 60: CPT

## 2024-05-17 PROCEDURE — G0506: CPT

## 2024-05-17 RX ORDER — ATORVASTATIN CALCIUM 40 MG/1
40 TABLET, FILM COATED ORAL
Qty: 90 | Refills: 3 | Status: ACTIVE | COMMUNITY
Start: 2021-01-13

## 2024-05-17 RX ORDER — METHYLPREDNISOLONE 4 MG/1
4 TABLET ORAL
Qty: 1 | Refills: 0 | Status: DISCONTINUED | COMMUNITY
Start: 2024-04-24 | End: 2024-05-17

## 2024-05-17 RX ORDER — ACETAMINOPHEN 500 MG/1
500 TABLET ORAL 3 TIMES DAILY
Qty: 180 | Refills: 0 | Status: DISCONTINUED | COMMUNITY
Start: 2023-11-09 | End: 2024-05-17

## 2024-05-17 RX ORDER — SPIRONOLACTONE 50 MG/1
50 TABLET ORAL DAILY
Qty: 90 | Refills: 3 | Status: ACTIVE | COMMUNITY
Start: 2024-05-17

## 2024-05-17 RX ORDER — OXYCODONE 5 MG/1
5 TABLET ORAL
Qty: 42 | Refills: 0 | Status: DISCONTINUED | COMMUNITY
Start: 2021-05-18 | End: 2024-05-17

## 2024-05-17 RX ORDER — ACETAMINOPHEN EXTRA STRENGTH 500 MG/1
500 TABLET ORAL
Qty: 84 | Refills: 0 | Status: DISCONTINUED | COMMUNITY
Start: 2021-05-18 | End: 2024-05-17

## 2024-05-17 RX ORDER — CLOTRIMAZOLE AND BETAMETHASONE DIPROPIONATE 10; .5 MG/G; MG/G
1-0.05 CREAM TOPICAL
Qty: 45 | Refills: 0 | Status: DISCONTINUED | COMMUNITY
Start: 2021-04-14 | End: 2024-05-17

## 2024-05-17 RX ORDER — TRAMADOL HYDROCHLORIDE 50 MG/1
50 TABLET, COATED ORAL
Qty: 50 | Refills: 0 | Status: DISCONTINUED | COMMUNITY
Start: 2023-08-25 | End: 2024-05-17

## 2024-05-17 RX ORDER — GABAPENTIN 100 MG/1
100 CAPSULE ORAL
Refills: 0 | Status: DISCONTINUED | COMMUNITY
End: 2024-05-17

## 2024-05-17 RX ORDER — GABAPENTIN 100 MG/1
100 CAPSULE ORAL
Qty: 42 | Refills: 0 | Status: DISCONTINUED | COMMUNITY
Start: 2021-07-08 | End: 2024-05-17

## 2024-05-17 RX ORDER — HYALURONATE SODIUM 20 MG/2 ML
20 SYRINGE (ML) INTRAARTICULAR
Qty: 1 | Refills: 0 | Status: DISCONTINUED | OUTPATIENT
Start: 2023-09-22 | End: 2024-05-17

## 2024-05-17 RX ORDER — TRIAMTERENE AND HYDROCHLOROTHIAZIDE 25; 37.5 MG/1; MG/1
37.5-25 TABLET ORAL
Refills: 0 | Status: DISCONTINUED | COMMUNITY
End: 2024-05-17

## 2024-05-17 RX ORDER — ELECTROLYTES/DEXTROSE
SOLUTION, ORAL ORAL DAILY
Qty: 90 | Refills: 3 | Status: ACTIVE | COMMUNITY
Start: 2024-05-17

## 2024-05-17 RX ORDER — HYALURONATE SODIUM 20 MG/2 ML
20 SYRINGE (ML) INTRAARTICULAR
Qty: 2 | Refills: 0 | Status: DISCONTINUED | OUTPATIENT
Start: 2020-05-28 | End: 2024-05-17

## 2024-05-17 RX ORDER — TRIAMTERENE AND HYDROCHLOROTHIAZIDE 37.5; 25 MG/1; MG/1
37.5-25 CAPSULE ORAL
Qty: 90 | Refills: 0 | Status: DISCONTINUED | COMMUNITY
Start: 2021-04-22 | End: 2024-05-17

## 2024-05-17 RX ORDER — MULTIVIT-MIN/IRON/FOLIC ACID/K 18-600-40
500 CAPSULE ORAL
Qty: 30 | Refills: 5 | Status: ACTIVE | COMMUNITY
Start: 2024-05-17

## 2024-05-17 RX ORDER — HYALURONATE SODIUM 20 MG/2 ML
20 SYRINGE (ML) INTRAARTICULAR
Qty: 2 | Refills: 0 | Status: DISCONTINUED | OUTPATIENT
Start: 2021-02-04 | End: 2024-05-17

## 2024-05-17 RX ORDER — NAPROXEN SODIUM 220 MG
TABLET ORAL
Refills: 0 | Status: DISCONTINUED | COMMUNITY
End: 2024-05-17

## 2024-05-17 RX ORDER — METHOCARBAMOL 500 MG/1
500 TABLET, FILM COATED ORAL
Qty: 90 | Refills: 0 | Status: DISCONTINUED | COMMUNITY
Start: 2021-05-18 | End: 2024-05-17

## 2024-05-17 NOTE — CHRONIC CARE ASSESSMENT
[PPS Score: ____] : Palliative Performance Scale (PPS) Score: [unfilled] [de-identified] : as tolerated [de-identified] : low sodium

## 2024-05-17 NOTE — REVIEW OF SYSTEMS
[Joint Pain] : joint pain [Muscle Pain] : muscle pain [Back Pain] : back pain [FreeTextEntry5] : Hypertension [FreeTextEntry9] : polyarthritic.  Knee and back surgery

## 2024-05-17 NOTE — COUNSELING
[ - New patient with 2 or more chronic conditions; CCM discussed and patient-centered care plan established] : New patient with 2 or more chronic conditions; CCM discussed and patient-centered care plan established [] : bone density screening [Advanced Directives discussed: ____] : Advanced directives discussed: [unfilled] [DNR] : Code Status: DNR [Comfort] : Treatment Guidelines: Comfort [DNI] : Intubation: DNI [[facility] Ver[facility] Date: _____] : [facility]pletion/last verification date: [unfilled] [FreeTextEntry4] : Educated patient/legal representative about CCM services and consent. Educated patient/legal representative that this service is available because they have 2 or more chronic conditions, that only one Provider can furnish CCM Services per calendar month and that CCM services are subject to the usual Medicare deductible and coinsurance.  Patient/legal representative understands the right to stop the CCM services at any time by notifying [facility] office. Patient/legal representative has verbally consented 5/2024

## 2024-05-17 NOTE — HISTORY OF PRESENT ILLNESS
[[facility] Co-Management Patient] : [unfilled] is a [facility] co-management patient [PT] : PT [Education provided] : education provided [FreeTextEntry1] : HTN, HLD, Obesity, here for elective R TKR w Dr. Mitchell, for HPT [FreeTextEntry2] : 68F w HTN, HLD, Obesity, hospitalized in May for elective R TKR w Dr. Mitchell, for HPT   Patient/ patient's caregiver reports no weight loss >10lbs in the past 6 months. No changes in dentition or swallowing reported, No changes in hearing or vision reported. No changes in Cognition reported. Patient denies any symptoms of depression or anxiety. Patient is ADL and IADL some dependecet. No changes in gait or falls reported.  Patient's home environment is safe.  Patient denies fever, cough, trouble breathing, rash, vomiting and diarrhea. Patient has not been in close contact with someone covid positive.  N95 mask, gloves, eye wear and gown used during visit: [Y]. Total face to face time with patient is 60 min.

## 2024-05-17 NOTE — REASON FOR VISIT
[FreeTextEntry1] : HTN, HLD, Obesity, here for elective R TKR w Dr. Mitchell, for HPT [FreeTextEntry3] : ortho

## 2024-05-18 DIAGNOSIS — Z96.643 PRESENCE OF ARTIFICIAL HIP JOINT, BILATERAL: ICD-10-CM

## 2024-05-18 DIAGNOSIS — E66.9 OBESITY, UNSPECIFIED: ICD-10-CM

## 2024-05-18 DIAGNOSIS — M17.11 UNILATERAL PRIMARY OSTEOARTHRITIS, RIGHT KNEE: ICD-10-CM

## 2024-05-18 DIAGNOSIS — J45.909 UNSPECIFIED ASTHMA, UNCOMPLICATED: ICD-10-CM

## 2024-05-18 DIAGNOSIS — Z88.0 ALLERGY STATUS TO PENICILLIN: ICD-10-CM

## 2024-05-18 DIAGNOSIS — D72.829 ELEVATED WHITE BLOOD CELL COUNT, UNSPECIFIED: ICD-10-CM

## 2024-05-18 DIAGNOSIS — I10 ESSENTIAL (PRIMARY) HYPERTENSION: ICD-10-CM

## 2024-05-18 DIAGNOSIS — Z91.040 LATEX ALLERGY STATUS: ICD-10-CM

## 2024-05-18 DIAGNOSIS — Z79.82 LONG TERM (CURRENT) USE OF ASPIRIN: ICD-10-CM

## 2024-05-18 DIAGNOSIS — Z96.652 PRESENCE OF LEFT ARTIFICIAL KNEE JOINT: ICD-10-CM

## 2024-05-18 DIAGNOSIS — D62 ACUTE POSTHEMORRHAGIC ANEMIA: ICD-10-CM

## 2024-05-18 DIAGNOSIS — K21.9 GASTRO-ESOPHAGEAL REFLUX DISEASE WITHOUT ESOPHAGITIS: ICD-10-CM

## 2024-05-18 DIAGNOSIS — E78.5 HYPERLIPIDEMIA, UNSPECIFIED: ICD-10-CM

## 2024-05-19 ENCOUNTER — NON-APPOINTMENT (OUTPATIENT)
Age: 69
End: 2024-05-19

## 2024-05-24 ENCOUNTER — APPOINTMENT (OUTPATIENT)
Dept: ORTHOPEDIC SURGERY | Facility: CLINIC | Age: 69
End: 2024-05-24
Payer: MEDICARE

## 2024-05-24 VITALS — TEMPERATURE: 207.68 F

## 2024-05-24 VITALS
OXYGEN SATURATION: 97 % | HEIGHT: 64 IN | SYSTOLIC BLOOD PRESSURE: 109 MMHG | BODY MASS INDEX: 37.56 KG/M2 | HEART RATE: 112 BPM | WEIGHT: 220 LBS | DIASTOLIC BLOOD PRESSURE: 75 MMHG

## 2024-05-24 PROCEDURE — 99024 POSTOP FOLLOW-UP VISIT: CPT

## 2024-05-28 NOTE — END OF VISIT
[FreeTextEntry3] : All medical record entries made by the Scribe were at my, Dr. Mateo Mitchell, direction and personally dictated by me on 05/24/2024. I have reviewed the chart and agree that the record accurately reflects my personal performance of the history, physical exam, assessment and plan. I have alsopersonally directed, reviewed, and agreed with the chart.

## 2024-05-28 NOTE — HISTORY OF PRESENT ILLNESS
[Procedure: ___] : status post [unfilled] [___ Weeks Post Op] : [unfilled] weeks post op [3] : the patient reports pain that is 3/10 in severity [de-identified] : first post-op visti date of surgery 09-may-2024  [de-identified] : 05/24/2024: 68 year old female now about 2 weeks s/p right total knee arthroplasty, overall doing well. She rates her pain as 3/10. She reports that she is taking 2 Oxycodone per day as well as Tylenol and Celebrex for pain management. She reports that home PT went well. She denies any fever, chills, or any other systemic indications of infection. She is ambulating with use of a cane.     [de-identified] : General appearance: well nourished and hydrated, pleasant, alert and oriented x 3, cooperative. HEENT: normocephalic, EOM intact, wearing mask, external auditory canal clear. Cardiovascular: no lower leg edema, no varicosities, dorsalis pedis pulses palpable and symmetric. Lymphatics: no palpable lymphadenopathy, no lymphedema. Neurologic: sensation is normal, no muscle weakness in upper or lower extremities, patella tendon reflexes present and symmetric. Dermatologic: skin moist, warm, no rash. Spine: cervical spine with normal lordosis and painless range of motion, thoracic spine with normal kyphosis and painless range of motion, lumbosacral spine with normal lordosis and painless range of motion.  No tenderness to palpation along midline spine and paraspinal musculature.  Sacroiliac joints nontender bilaterally. Negative SLR and crossed SLR tests bilaterally. Gait: Demonstrates a slightly cautious gait with the use of a cane.    Right knee: - Focal soft tissue swelling: none - Ecchymosis: none - Erythema: none - Effusion: mild to moderate - Wounds: well-healed anterior midline incision, benign appearing, with distal scabbing - Alignment: normal - Tenderness: none - ROM: 3-85 - Collateral laxity: none - Cruciate laxity: none - Popliteal angle (degrees): - Quad strength: 5/5 [de-identified] : IMP: 68 year old female 2 weeks s/p right total knee arthroplasty, overall doing well.  [de-identified] : - I provided her with a PT script and an HEP.  - She will continue Tylenol and Celebrex for pain management and oxycodone for severe pain.   - She will follow up in 4 weeks with right knee XRs.

## 2024-05-28 NOTE — ADDENDUM
[FreeTextEntry1] : I, Jessica Perez, documented this note as a scribe on behalf of Dr. Mateo Mitchell on 05/24/2024.

## 2024-06-13 ENCOUNTER — NON-APPOINTMENT (OUTPATIENT)
Age: 69
End: 2024-06-13

## 2024-06-20 ENCOUNTER — RX RENEWAL (OUTPATIENT)
Age: 69
End: 2024-06-20

## 2024-06-20 RX ORDER — CELECOXIB 200 MG/1
200 CAPSULE ORAL
Qty: 60 | Refills: 2 | Status: ACTIVE | COMMUNITY
Start: 2023-06-21 | End: 1900-01-01

## 2024-06-21 ENCOUNTER — RESULT REVIEW (OUTPATIENT)
Age: 69
End: 2024-06-21

## 2024-06-21 ENCOUNTER — OUTPATIENT (OUTPATIENT)
Dept: OUTPATIENT SERVICES | Facility: HOSPITAL | Age: 69
LOS: 1 days | End: 2024-06-21
Payer: MEDICARE

## 2024-06-21 ENCOUNTER — APPOINTMENT (OUTPATIENT)
Dept: ORTHOPEDIC SURGERY | Facility: CLINIC | Age: 69
End: 2024-06-21
Payer: MEDICARE

## 2024-06-21 VITALS
BODY MASS INDEX: 37.56 KG/M2 | WEIGHT: 220 LBS | DIASTOLIC BLOOD PRESSURE: 81 MMHG | SYSTOLIC BLOOD PRESSURE: 113 MMHG | OXYGEN SATURATION: 98 % | HEIGHT: 64 IN | TEMPERATURE: 209.12 F | HEART RATE: 97 BPM

## 2024-06-21 DIAGNOSIS — Z96.641 PRESENCE OF RIGHT ARTIFICIAL HIP JOINT: Chronic | ICD-10-CM

## 2024-06-21 DIAGNOSIS — Z96.659 PRESENCE OF UNSPECIFIED ARTIFICIAL KNEE JOINT: Chronic | ICD-10-CM

## 2024-06-21 DIAGNOSIS — Z47.1 AFTERCARE FOLLOWING JOINT REPLACEMENT SURGERY: ICD-10-CM

## 2024-06-21 DIAGNOSIS — Z96.651 AFTERCARE FOLLOWING JOINT REPLACEMENT SURGERY: ICD-10-CM

## 2024-06-21 PROCEDURE — 73562 X-RAY EXAM OF KNEE 3: CPT | Mod: 26,RT

## 2024-06-21 PROCEDURE — 73562 X-RAY EXAM OF KNEE 3: CPT

## 2024-06-21 PROCEDURE — 99024 POSTOP FOLLOW-UP VISIT: CPT

## 2024-06-21 RX ORDER — ACETAMINOPHEN 500 MG/1
500 TABLET ORAL
Qty: 180 | Refills: 1 | Status: ACTIVE | COMMUNITY
Start: 2023-06-21 | End: 1900-01-01

## 2024-06-24 NOTE — END OF VISIT
[FreeTextEntry3] : All medical record entries made by the Scribe were at my, Dr. Mateo Mitchell, direction and personally dictated by me on 06/21/2024. I have reviewed the chart and agree that the record accurately reflects my personal performance of the history, physical exam, assessment and plan. I have alsopersonally directed, reviewed, and agreed with the chart.

## 2024-06-24 NOTE — HISTORY OF PRESENT ILLNESS
[Procedure: ___] : status post [unfilled] [___ Weeks Post Op] : [unfilled] weeks post op [Chills] : no chills [Constipation] : no constipation [Diarrhea] : no diarrhea [Dysuria] : no dysuria [Fever] : no fever [Nausea] : no nausea [Vomiting] : no vomiting [de-identified] : second post-op visit Date of surgery 05/09/2024 [de-identified] : 06/21/2024: 68 year old female presenting about 6 weeks s/p right total knee arthroplasty. She is taking oxycodone 5 mg daily, Tylenol, and Celebrex as needed for pain. She is continuing to participate in PT twice a week. She is using a cane secondary to issues balancing.  [de-identified] : General appearance: well nourished and hydrated, pleasant, alert and oriented x 3, cooperative. HEENT: normocephalic, EOM intact, wearing mask, external auditory canal clear. Cardiovascular: no lower leg edema, no varicosities, dorsalis pedis pulses palpable and symmetric. Lymphatics: no palpable lymphadenopathy, no lymphedema. Neurologic: sensation is normal, no muscle weakness in upper or lower extremities, patella tendon reflexes present and symmetric. Dermatologic: skin moist, warm, no rash. Spine: cervical spine with normal lordosis and painless range of motion, thoracic spine with normal kyphosis and painless range of motion, lumbosacral spine with normal lordosis and painless range of motion.  No tenderness to palpation along midline spine and paraspinal musculature.  Sacroiliac joints nontender bilaterally. Negative SLR and crossed SLR tests bilaterally. Gait: Presents with a cane and a smalll shopping cart. Without the assistive devices, she continues to demonstrate a stable but still mildly right antalgic gait pattern with a forward leaning posture from the lumbopelvic junction.   Right knee: - Focal soft tissue swelling: none - Ecchymosis: none - Erythema: none - Effusion: small residual, No palpable Baker's cyst - Wounds: Well-healed midline incision, benign appearing. Tibial stab incisions demonstrate, for the proximal incision some mild epidermal dehiscence. The distal incision is scabbed over. There is no surrounding cellulitis. The incisions are benign appearing. - Alignment: normal - Tenderness: none - ROM: 3-90 - Collateral laxity: none - Cruciate laxity: none  - Popliteal angle (degrees): 35 - Quad strength: 4+/5 with a 5-10 degree extensor lag  [de-identified] : Right knee XRs  were interpreted by me and reviewed with the patient.   Location of imaging: [facility] Date of exam: 06/21/2024  Right knee -- Stable appearance of a right total  knee arthroplasty as compared to immediate postoperative imaging without evidence of mechanical complication. Patellar height: normal Patellar tracking: centrally   [de-identified] : IMP: 68 year old female now approximately 6 weeks s/p right total knee arthroplasty with mild ongoing postoperative arthrofibrosis and ongoing opioid use.  [de-identified] : - She had a preoperative ROM of 110 degrees, and I encouraged her to try her best to get back to that ROM.  - She will continue with PT and HEP.  - I refilled her Tylenol per her request.  - I instructed her to wean off the oxycodone as quickly as possible.  - She will follow up next in 2 months with repeat right knee XRs.

## 2024-06-24 NOTE — ADDENDUM
[FreeTextEntry1] : I, Jessica Perez, documented this note as a scribe on behalf of Dr. Mateo Mitchell on 06/21/2024.

## 2024-06-28 NOTE — PHYSICAL EXAM
[de-identified] : General: patient is well developed, well nourished, in no acute  distress, alert and oriented x 3.    Mood and affect: normal    Respiratory: no respiratory distress noted    Alignment:The spine is well compensated in the coronal and sagittal plane.    Skin: Well healed anterior and posterior incision    Gait: The patient is able to toe walk and heel walk without difficulty.    Palpation: no tenderness to palpation spine or paraspinal region    Range of motion: Lumbar spine ROM is full    Neurologic Exam:  Motor: Manual Muscle testing in the lower extremities is 5 out of 5 in all muscle groups. There is no evidence of muscular atrophy in the lower extremities. Sensory: Sensation to light touch is grossly intact in the lower extremities    Hip Exam: No pain with internal or external rotation of hips bilaterally    Special tests: Straight leg raise test negative. Cross straight leg test negative. [de-identified] : XR full spine ap/lateral, lumbar flexion/extension 5/6/24 (my read): L4/5 ALIF with posterior instrumentation. This area appears fused. L3/4 demonstrates grade 1 spondylolisthesis, no significant change in the past year. There does appear to be instability on flexion/extension. The patient is reduced in the L3/4 position and has significant listhesis in the flexion position. forward sagittal alignment. trunk shift to the left  XR Lumbar 4-View 05/09/2023 [my read]: L4/5 ALIF with posterior instrumentation. This area appears fused. L3/4 demonstrates grade 1 spondylolisthesis, this does appear to be mildly increased since her last images last year. There does appear to be instability on flexion/extension. The patient is reduced in the L3/4 position and has significant listhesis in the flexion position.  XR Lumbar AP/Lateral, flexion/extension 05/10/2022 (my read): Hardware intact, stable alignment. Appears to be fused anteriorly and posteriorly. Stable Grade 1 spondylolisthesis of L3/4. L5/S1 stable severe degenerative disc disease.  XR 12/14/21: Hardware intact, stable alignment, unchanged from intraop. Appears to be fused posteriorly bilaterally. appears to have anterior fusion developing additionally.

## 2024-06-28 NOTE — END OF VISIT
[FreeTextEntry3] :   This note was written by Alise Whiteside PA-C, acting as a scribe for Dr. Jose Velazco. I, Dr. Jose Velazco, have read and attest that all the information, medical decision-making, and discharge instructions within are true and accurate.  I, Dr. Jose Velazco, personally performed the evaluation and management (E/M) services for this new patient. That E/M includes conducting the initial examination, assessing all conditions, and establishing the plan of care. Today, my ACP, Alise Whiteside PA-C, was here to observe my evaluation and management services for this patient to be followed going forward.

## 2024-06-28 NOTE — DISCUSSION/SUMMARY
[de-identified] : Diagnosis: L3/4 unstable spondylolisthesis without significant symptoms, healed L4/5 spinal fusion.  Discussed my findings with the patient. Discussed she will likely need surgical intervention in the future for unstable spondylolisthesis. Recommending evaluation with rheumatology for arthritis in multiple joints requiring replacements, contact information given. She is to undergo a right TKR this week. Follow up with me in 3 months, sooner if there is an issue. XR full spine ap/lateral at that time. All questions answered.

## 2024-06-28 NOTE — HISTORY OF PRESENT ILLNESS
[de-identified] : Follow up 5/6/24: Patient returns for follow up. She is now approximately 3 years post op. She reports mild increase in low back pain and left buttock pain and forward posture. However, her primary symptom is right knee pain and she is having a right TKR with Dr. Mitchell this week.   Follow up 05/09/2023: Ms. Lai is here for follow up. She reports that she is quite happy with her result. It has been approximately 2 years since her L4/5 lumbar fusion. She denies any significant back pain. She denies any radiating pain down her legs. She denies any numbness or tingling.  Follow up 5/10/22: Patient presents for follow up 1 year s/p L4/5 ALIF and L4-5 laminectomy PSF instrumentation doing well. She reports resolution of her lower back and lower extremity radiating pain denies new neurologic symptoms. She is quite happy with her result to date. She does report new right buttock pain. She has a history of bilateral THR with Dr. Isidro Raygoza.  Follow up 12/14/21: 6 months s/p L4/L5 ALIF; L4/L5 laminectomy, psf, doing well. Reports resolution of low back and lower extremity pain. Denies new neurologic symptoms. Very happy with result to date.

## 2024-07-23 ENCOUNTER — NON-APPOINTMENT (OUTPATIENT)
Age: 69
End: 2024-07-23

## 2024-08-19 ENCOUNTER — APPOINTMENT (OUTPATIENT)
Dept: ORTHOPEDIC SURGERY | Facility: CLINIC | Age: 69
End: 2024-08-19
Payer: MEDICARE

## 2024-08-19 DIAGNOSIS — M43.16 SPONDYLOLISTHESIS, LUMBAR REGION: ICD-10-CM

## 2024-08-19 DIAGNOSIS — Z98.1 ARTHRODESIS STATUS: ICD-10-CM

## 2024-08-19 PROCEDURE — 72082 X-RAY EXAM ENTIRE SPI 2/3 VW: CPT

## 2024-08-19 PROCEDURE — 99213 OFFICE O/P EST LOW 20 MIN: CPT

## 2024-08-19 NOTE — PHYSICAL EXAM
[de-identified] : General: patient is well developed, well nourished, in no acute  distress, alert and oriented x 3.    Mood and affect: normal    Respiratory: no respiratory distress noted    Alignment:The spine is well compensated in the coronal and sagittal plane.    Skin: Well healed anterior and posterior incision    Gait: The patient is able to toe walk and heel walk without difficulty.    Palpation: no tenderness to palpation spine or paraspinal region    Range of motion: Lumbar spine ROM is full    Neurologic Exam:  Motor: Manual Muscle testing in the lower extremities is 5 out of 5 in all muscle groups. There is no evidence of muscular atrophy in the lower extremities. Sensory: Sensation to light touch is grossly intact in the lower extremities    Hip Exam: No pain with internal or external rotation of hips bilaterally    Special tests: Straight leg raise test negative. Cross straight leg test negative. [de-identified] : XR 8/19/24 (my read): L4/5 ALIF with posterior instrumentation. This area appears fused. L3/4 demonstrates grade 1 spondylolisthesis, no significant change in the past year. There does appear to be instability on flexion/extension. The patient is reduced in the L3/4 position and has significant listhesis in the flexion position. forward sagittal alignment. trunk shift to the left  XR full spine ap/lateral, lumbar flexion/extension 5/6/24 (my read): L4/5 ALIF with posterior instrumentation. This area appears fused. L3/4 demonstrates grade 1 spondylolisthesis, no significant change in the past year. There does appear to be instability on flexion/extension. The patient is reduced in the L3/4 position and has significant listhesis in the flexion position. forward sagittal alignment. trunk shift to the left  XR Lumbar 4-View 05/09/2023 [my read]: L4/5 ALIF with posterior instrumentation. This area appears fused. L3/4 demonstrates grade 1 spondylolisthesis, this does appear to be mildly increased since her last images last year. There does appear to be instability on flexion/extension. The patient is reduced in the L3/4 position and has significant listhesis in the flexion position.  XR Lumbar AP/Lateral, flexion/extension 05/10/2022 (my read): Hardware intact, stable alignment. Appears to be fused anteriorly and posteriorly. Stable Grade 1 spondylolisthesis of L3/4. L5/S1 stable severe degenerative disc disease.  XR 12/14/21: Hardware intact, stable alignment, unchanged from intraop. Appears to be fused posteriorly bilaterally. appears to have anterior fusion developing additionally.

## 2024-08-19 NOTE — DISCUSSION/SUMMARY
[de-identified] : Diagnosis: L3/4 unstable spondylolisthesis, healed L4/5 spinal fusion.  Discussed my findings with the patient. She reports progression of her low back and radicular symptoms over the past 3 months. XR imaging shows progression of L3/L4 anterolisthesis above her surgical construct as well as L5/S1 disc degneration below the fusion. I am recommending an MRI lumbar spine without contrast, order given. Also given an order for physical therapy to start in the interim. Follow up with me after imaging has been completed, sooner if there is an issue. All questions answered.

## 2024-08-19 NOTE — HISTORY OF PRESENT ILLNESS
[de-identified] : Follow up 8/19/24: Patient returns for follow up. She reports progression of low back pain into her buttocks. Since last visit, feels like posture has worsened. Leaning more towards the left and forward. She is 3 months s/p right TKR with Dr. Mitchell with improvement of knee pain.

## 2024-08-30 ENCOUNTER — RESULT REVIEW (OUTPATIENT)
Age: 69
End: 2024-08-30

## 2024-08-30 ENCOUNTER — OUTPATIENT (OUTPATIENT)
Dept: OUTPATIENT SERVICES | Facility: HOSPITAL | Age: 69
LOS: 1 days | End: 2024-08-30
Payer: MEDICARE

## 2024-08-30 ENCOUNTER — APPOINTMENT (OUTPATIENT)
Dept: MRI IMAGING | Facility: HOSPITAL | Age: 69
End: 2024-08-30

## 2024-08-30 ENCOUNTER — APPOINTMENT (OUTPATIENT)
Dept: ORTHOPEDIC SURGERY | Facility: CLINIC | Age: 69
End: 2024-08-30

## 2024-08-30 VITALS
HEIGHT: 64 IN | DIASTOLIC BLOOD PRESSURE: 80 MMHG | BODY MASS INDEX: 37.56 KG/M2 | WEIGHT: 220 LBS | SYSTOLIC BLOOD PRESSURE: 118 MMHG | OXYGEN SATURATION: 96 % | HEART RATE: 92 BPM

## 2024-08-30 DIAGNOSIS — Z98.89 OTHER SPECIFIED POSTPROCEDURAL STATES: Chronic | ICD-10-CM

## 2024-08-30 DIAGNOSIS — Z96.659 PRESENCE OF UNSPECIFIED ARTIFICIAL KNEE JOINT: Chronic | ICD-10-CM

## 2024-08-30 DIAGNOSIS — Z96.641 PRESENCE OF RIGHT ARTIFICIAL HIP JOINT: Chronic | ICD-10-CM

## 2024-08-30 PROCEDURE — 73564 X-RAY EXAM KNEE 4 OR MORE: CPT

## 2024-08-30 PROCEDURE — 72148 MRI LUMBAR SPINE W/O DYE: CPT

## 2024-08-30 PROCEDURE — 72148 MRI LUMBAR SPINE W/O DYE: CPT | Mod: 26

## 2024-08-30 PROCEDURE — 99214 OFFICE O/P EST MOD 30 MIN: CPT

## 2024-08-30 PROCEDURE — 73564 X-RAY EXAM KNEE 4 OR MORE: CPT | Mod: 26,RT

## 2024-08-30 NOTE — PHYSICAL EXAM
[de-identified] : General appearance: well nourished and hydrated, pleasant, alert and oriented x 3, cooperative.   HEENT: normocephalic, EOM intact, wearing mask, external auditory canal clear.   Cardiovascular: no lower leg edema, no varicosities, dorsalis pedis pulses palpable and symmetric.   Lymphatics: no palpable lymphadenopathy, no lymphedema.   Neurologic: sensation is normal, no muscle weakness in upper or lower extremities, patella tendon reflexes present and symmetric.   Dermatologic: skin moist, warm, no rash.   Spine: cervical spine with normal lordosis and painless range of motion, thoracic spine with normal kyphosis and painless range of motion, lumbosacral spine with normal lordosis and painless range of motion.  No tenderness to palpation along midline spine and paraspinal musculature.  Sacroiliac joints nontender bilaterally. Negative SLR and crossed SLR tests bilaterally. Gait: normal w/o use of cane  Right knee: - Focal soft tissue swelling: none - Ecchymosis: none - Erythema: none - Effusion: none - Wounds: well-healed anterior midline surgical incision, benign-appearing - Alignment: normal - Tenderness: none - ROM: 3-95 - Collateral laxity: none - Cruciate laxity: none - Quad strength: 5/5

## 2024-08-30 NOTE — END OF VISIT
[FreeTextEntry3] :   Documented by Carolann Zacarias acting as a scribe for KRISTEN Herrera. 08/30/2024   All medical record entries made by the Carolann Zacarias (Scribe) were at my, KRISTEN Herrera, direction and personally dictated by me on 08/30/2024. I have reviewed the chart and agree that the record accurately reflects my personal performance of the history, physical exam, assessment and plan. I have also personally directed, reviewed, and agreed with the chart.

## 2024-08-30 NOTE — ADDENDUM
[FreeTextEntry1] :   Documented by Carolann Zacarias acting as a scribe for KRISTEN Herrera. 08/30/2024

## 2024-08-30 NOTE — DISCUSSION/SUMMARY
[de-identified] : 68-year-old female now three and a half months status-post right total knee arthroplasty, overall doing well at this time.  I advise to continue with the physical therapy and the home exercise program. Continue Tylenol and Celebrex as needed for pain. Will follow again every May with repeat bilateral knee x-rays or earlier as needed for any new or worsening symptoms.

## 2024-08-30 NOTE — HISTORY OF PRESENT ILLNESS
[de-identified] : 68-year-old female now about three and a half months status-post right total knee arthroplasty. She reports to her she's been attending physical therapy consistently and has noticed good improvement thus far. She's not having as much discomfort in the morning times. Mainly states her primary pain generated at this time is her lower back, which she is seen Dr. Velazco for at this time. Taking Tylenol and Celebrex is needed for pain. Denies any new complaints today.

## 2024-10-29 ENCOUNTER — APPOINTMENT (OUTPATIENT)
Dept: HOME HEALTH SERVICES | Facility: HOME HEALTH | Age: 69
End: 2024-10-29

## 2024-10-30 ENCOUNTER — NON-APPOINTMENT (OUTPATIENT)
Age: 69
End: 2024-10-30

## 2024-11-05 ENCOUNTER — APPOINTMENT (OUTPATIENT)
Dept: HOME HEALTH SERVICES | Facility: HOME HEALTH | Age: 69
End: 2024-11-05
Payer: MEDICARE

## 2024-11-05 VITALS
OXYGEN SATURATION: 96 % | TEMPERATURE: 208.04 F | DIASTOLIC BLOOD PRESSURE: 70 MMHG | HEART RATE: 88 BPM | RESPIRATION RATE: 18 BRPM | SYSTOLIC BLOOD PRESSURE: 130 MMHG

## 2024-11-05 DIAGNOSIS — I10 ESSENTIAL (PRIMARY) HYPERTENSION: ICD-10-CM

## 2024-11-05 DIAGNOSIS — E78.2 MIXED HYPERLIPIDEMIA: ICD-10-CM

## 2024-11-05 DIAGNOSIS — Z96.652 AFTERCARE FOLLOWING JOINT REPLACEMENT SURGERY: ICD-10-CM

## 2024-11-05 DIAGNOSIS — Z23 ENCOUNTER FOR IMMUNIZATION: ICD-10-CM

## 2024-11-05 DIAGNOSIS — Z47.1 AFTERCARE FOLLOWING JOINT REPLACEMENT SURGERY: ICD-10-CM

## 2024-11-05 DIAGNOSIS — M51.369: ICD-10-CM

## 2024-11-05 DIAGNOSIS — E66.812 OBESITY, CLASS 2: ICD-10-CM

## 2024-11-05 DIAGNOSIS — M54.41 LUMBAGO WITH SCIATICA, RIGHT SIDE: ICD-10-CM

## 2024-11-05 DIAGNOSIS — G89.29 LUMBAGO WITH SCIATICA, RIGHT SIDE: ICD-10-CM

## 2024-11-05 DIAGNOSIS — M17.11 UNILATERAL PRIMARY OSTEOARTHRITIS, RIGHT KNEE: ICD-10-CM

## 2024-11-05 DIAGNOSIS — Z98.1 ARTHRODESIS STATUS: ICD-10-CM

## 2024-11-05 PROCEDURE — 99349 HOME/RES VST EST MOD MDM 40: CPT

## 2024-11-12 ENCOUNTER — APPOINTMENT (OUTPATIENT)
Dept: ORTHOPEDIC SURGERY | Facility: CLINIC | Age: 69
End: 2024-11-12

## 2024-11-12 VITALS
WEIGHT: 220 LBS | TEMPERATURE: 208.4 F | HEART RATE: 89 BPM | OXYGEN SATURATION: 97 % | DIASTOLIC BLOOD PRESSURE: 76 MMHG | BODY MASS INDEX: 37.56 KG/M2 | SYSTOLIC BLOOD PRESSURE: 112 MMHG | HEIGHT: 64 IN

## 2024-11-12 PROCEDURE — 99214 OFFICE O/P EST MOD 30 MIN: CPT

## 2024-11-15 RX ORDER — METHOCARBAMOL 500 MG/1
500 TABLET, FILM COATED ORAL EVERY 8 HOURS
Qty: 42 | Refills: 1 | Status: ACTIVE | COMMUNITY
Start: 2024-11-15 | End: 1900-01-01

## 2024-12-03 ENCOUNTER — APPOINTMENT (OUTPATIENT)
Dept: ORTHOPEDIC SURGERY | Facility: CLINIC | Age: 69
End: 2024-12-03
Payer: MEDICARE

## 2024-12-03 DIAGNOSIS — M53.3 SACROCOCCYGEAL DISORDERS, NOT ELSEWHERE CLASSIFIED: ICD-10-CM

## 2024-12-03 DIAGNOSIS — M43.16 SPONDYLOLISTHESIS, LUMBAR REGION: ICD-10-CM

## 2024-12-03 DIAGNOSIS — M40.36 FLATBACK SYNDROME, LUMBAR REGION: ICD-10-CM

## 2024-12-03 DIAGNOSIS — M51.369: ICD-10-CM

## 2024-12-03 DIAGNOSIS — G89.29 SACROCOCCYGEAL DISORDERS, NOT ELSEWHERE CLASSIFIED: ICD-10-CM

## 2024-12-03 DIAGNOSIS — M48.061 SPINAL STENOSIS, LUMBAR REGION WITHOUT NEUROGENIC CLAUDICATION: ICD-10-CM

## 2024-12-03 DIAGNOSIS — Z98.1 ARTHRODESIS STATUS: ICD-10-CM

## 2024-12-03 PROCEDURE — 99214 OFFICE O/P EST MOD 30 MIN: CPT

## 2024-12-03 RX ORDER — METHOCARBAMOL 1000 MG/1
1000 TABLET, FILM COATED ORAL
Qty: 90 | Refills: 3 | Status: ACTIVE | COMMUNITY
Start: 2024-12-03 | End: 1900-01-01

## 2024-12-20 ENCOUNTER — RX RENEWAL (OUTPATIENT)
Age: 69
End: 2024-12-20

## 2025-02-03 ENCOUNTER — NON-APPOINTMENT (OUTPATIENT)
Age: 70
End: 2025-02-03

## 2025-03-05 RX ORDER — METHOCARBAMOL 500 MG/1
500 TABLET, FILM COATED ORAL EVERY 8 HOURS
Qty: 30 | Refills: 1 | Status: ACTIVE | COMMUNITY
Start: 2025-03-05 | End: 1900-01-01

## 2025-03-15 ENCOUNTER — NON-APPOINTMENT (OUTPATIENT)
Age: 70
End: 2025-03-15

## 2025-04-09 ENCOUNTER — NON-APPOINTMENT (OUTPATIENT)
Age: 70
End: 2025-04-09

## 2025-04-17 DIAGNOSIS — Z01.818 ENCOUNTER FOR OTHER PREPROCEDURAL EXAMINATION: ICD-10-CM

## 2025-04-17 LAB
MRSA SPEC QL CULT: NEGATIVE
STAPH AUREUS (SA): NEGATIVE

## 2025-04-18 ENCOUNTER — APPOINTMENT (OUTPATIENT)
Dept: MRI IMAGING | Facility: CLINIC | Age: 70
End: 2025-04-18
Payer: MEDICARE

## 2025-04-18 PROCEDURE — 72148 MRI LUMBAR SPINE W/O DYE: CPT

## 2025-04-22 ENCOUNTER — NON-APPOINTMENT (OUTPATIENT)
Age: 70
End: 2025-04-22

## 2025-04-22 RX ORDER — POVIDONE-IODINE 7.5 %
1 SOLUTION, NON-ORAL TOPICAL ONCE
Refills: 0 | Status: COMPLETED | OUTPATIENT
Start: 2025-04-24 | End: 2025-04-24

## 2025-04-22 NOTE — H&P ADULT - NSHPPHYSICALEXAM_GEN_ALL_CORE
General:  PE:  Decreased ROM secondary to pain. Rest of PE per medical clearance. Gen: Comfortable, pleasant, NAD, alert, oriented  MSK: Decreased ROM to lumbar spine secondary to pain.   MSK: No deformity or open wounds. No rashes or lesions. EHL/TA/GS/FHL 5/5 BLE. Sensation intact and equal BLE. Skin warm and well perfused. DP palpable BLE. Cap refill brisk. Calves soft, nontender BLE.   Rest of PE per medical clearance.

## 2025-04-22 NOTE — H&P ADULT - NSICDXPASTSURGICALHX_GEN_ALL_CORE_FT
Contrast: Isovue. Contrast concentration: 370. Amount: 13 mL. PAST SURGICAL HISTORY:  H/O breast surgery bilateral lift    H/O total knee replacement left    History of hip replacement, total, right 2016 right , left 2018

## 2025-04-22 NOTE — H&P ADULT - HISTORY OF PRESENT ILLNESS
68yo f c/o back pain x   Presents today for elective L3-L4, L5-S1 LAMI, PSF L3-S1, TLIF L5-S1, L3-PELVIS OSTEOTOMIES. 70yo f c/o back pain that is acute on chronic. Pt reports she had chronic low back pain that was treated with a spinal fusion with Dr Velazco 5 years ago. At that time she recovered well.  Overtime her knees became painful so she underwent a left TKA 2 years ago, and a right TKA 1 year ago with Dr. Mitchell.  In the last year, her low back pain has been progressing once again. It does not radiate down her legs. Noticing some tingling to her left toes intermittently lately. Denies weakness. Ambulates with a cane at baseline. States she did not need prescription strength opioids after 2 weeks postop from her knee replacements  and now takes tylenol and celebrex for pain.     Presents today for elective L3-L4, L5-S1 lami, PSF L3-S1, TLIF L5-S1, L3-pelvis osteotomies

## 2025-04-22 NOTE — H&P ADULT - PROBLEM SELECTOR PLAN 1
Admit to Orthopaedic Service.  Presents today for elective L3-L4, L5-S1 LAMI, PSF L3-S1, TLIF L5-S1, L3-PELVIS OSTEOTOMIES.  Pt medically stable and cleared for procedure today by Dr. Vásquez and Dr. Hutson (cardio) Admit to Orthopaedic Service.  Presents today for elective L3-L4, L5-S1 LAMI, PSF L3-S1, TLIF L5-S1, L3-pelvic osteotomies   Pt medically stable and cleared for procedure today by Dr. Vásquez and Dr. Hutson (cardio)

## 2025-04-22 NOTE — H&P ADULT - NSHPLABSRESULTS_GEN_ALL_CORE
preop cbc/bmp/coags/ua wnl per medical clearance  Cr 0.76  H/H 14.3/42.9  HGA1C 5.3%  EKG- sinus rhythm, LAE  CXR wnl per medical clearance   ECHO- wnl per medical clearance
Statement Selected

## 2025-04-23 ENCOUNTER — RESULT REVIEW (OUTPATIENT)
Age: 70
End: 2025-04-23

## 2025-04-23 ENCOUNTER — APPOINTMENT (OUTPATIENT)
Dept: ORTHOPEDIC SURGERY | Facility: CLINIC | Age: 70
End: 2025-04-23
Payer: MEDICARE

## 2025-04-23 ENCOUNTER — OUTPATIENT (OUTPATIENT)
Dept: OUTPATIENT SERVICES | Facility: HOSPITAL | Age: 70
LOS: 1 days | End: 2025-04-23
Payer: MEDICARE

## 2025-04-23 VITALS
TEMPERATURE: 98 F | OXYGEN SATURATION: 96 % | WEIGHT: 227.08 LBS | SYSTOLIC BLOOD PRESSURE: 144 MMHG | HEIGHT: 64 IN | HEART RATE: 89 BPM | RESPIRATION RATE: 16 BRPM | DIASTOLIC BLOOD PRESSURE: 89 MMHG

## 2025-04-23 VITALS
SYSTOLIC BLOOD PRESSURE: 125 MMHG | BODY MASS INDEX: 37.56 KG/M2 | DIASTOLIC BLOOD PRESSURE: 82 MMHG | HEIGHT: 64 IN | WEIGHT: 220 LBS | OXYGEN SATURATION: 97 % | HEART RATE: 86 BPM

## 2025-04-23 DIAGNOSIS — Z47.1 AFTERCARE FOLLOWING JOINT REPLACEMENT SURGERY: ICD-10-CM

## 2025-04-23 DIAGNOSIS — M17.11 UNILATERAL PRIMARY OSTEOARTHRITIS, RIGHT KNEE: ICD-10-CM

## 2025-04-23 DIAGNOSIS — Z98.89 OTHER SPECIFIED POSTPROCEDURAL STATES: Chronic | ICD-10-CM

## 2025-04-23 DIAGNOSIS — Z96.653 AFTERCARE FOLLOWING JOINT REPLACEMENT SURGERY: ICD-10-CM

## 2025-04-23 DIAGNOSIS — Z98.890 OTHER SPECIFIED POSTPROCEDURAL STATES: Chronic | ICD-10-CM

## 2025-04-23 DIAGNOSIS — M25.50 PAIN IN UNSPECIFIED JOINT: ICD-10-CM

## 2025-04-23 DIAGNOSIS — Z96.641 PRESENCE OF RIGHT ARTIFICIAL HIP JOINT: Chronic | ICD-10-CM

## 2025-04-23 DIAGNOSIS — Z96.652 AFTERCARE FOLLOWING JOINT REPLACEMENT SURGERY: ICD-10-CM

## 2025-04-23 DIAGNOSIS — Z96.651 AFTERCARE FOLLOWING JOINT REPLACEMENT SURGERY: ICD-10-CM

## 2025-04-23 DIAGNOSIS — Z96.659 PRESENCE OF UNSPECIFIED ARTIFICIAL KNEE JOINT: Chronic | ICD-10-CM

## 2025-04-23 DIAGNOSIS — Z98.49 CATARACT EXTRACTION STATUS, UNSPECIFIED EYE: Chronic | ICD-10-CM

## 2025-04-23 PROCEDURE — 73564 X-RAY EXAM KNEE 4 OR MORE: CPT

## 2025-04-23 PROCEDURE — 73564 X-RAY EXAM KNEE 4 OR MORE: CPT | Mod: 26,50

## 2025-04-23 PROCEDURE — 99214 OFFICE O/P EST MOD 30 MIN: CPT

## 2025-04-23 RX ORDER — LIDOCAINE 5% 700 MG/1
5 PATCH TOPICAL
Qty: 30 | Refills: 2 | Status: ACTIVE | COMMUNITY
Start: 2025-04-23 | End: 1900-01-01

## 2025-04-23 NOTE — PATIENT PROFILE ADULT - STATED REASON FOR ADMISSION
L3/L4 and L5/S1 laminectomy , L3-S1 PSF , TLIF L5/S1, L3-pelvis instrumentation , osteotomies, cage

## 2025-04-23 NOTE — PATIENT PROFILE ADULT - NSPROIMPLANTSMEDDEV_GEN_A_NUR
right hip, and left/Artificial joint right hip, and left replacement  B/L knee replacements/Artificial joint

## 2025-04-24 ENCOUNTER — NON-APPOINTMENT (OUTPATIENT)
Age: 70
End: 2025-04-24

## 2025-04-24 ENCOUNTER — INPATIENT (INPATIENT)
Facility: HOSPITAL | Age: 70
LOS: 6 days | Discharge: HOME HEALTH | DRG: 426 | End: 2025-05-01
Attending: ORTHOPAEDIC SURGERY | Admitting: ORTHOPAEDIC SURGERY
Payer: MEDICARE

## 2025-04-24 ENCOUNTER — APPOINTMENT (OUTPATIENT)
Dept: ORTHOPEDIC SURGERY | Facility: HOSPITAL | Age: 70
End: 2025-04-24

## 2025-04-24 DIAGNOSIS — Z87.39 PERSONAL HISTORY OF OTHER DISEASES OF THE MUSCULOSKELETAL SYSTEM AND CONNECTIVE TISSUE: ICD-10-CM

## 2025-04-24 DIAGNOSIS — Z96.641 PRESENCE OF RIGHT ARTIFICIAL HIP JOINT: Chronic | ICD-10-CM

## 2025-04-24 DIAGNOSIS — Z98.890 OTHER SPECIFIED POSTPROCEDURAL STATES: Chronic | ICD-10-CM

## 2025-04-24 DIAGNOSIS — Z98.89 OTHER SPECIFIED POSTPROCEDURAL STATES: Chronic | ICD-10-CM

## 2025-04-24 DIAGNOSIS — J45.909 UNSPECIFIED ASTHMA, UNCOMPLICATED: ICD-10-CM

## 2025-04-24 DIAGNOSIS — I10 ESSENTIAL (PRIMARY) HYPERTENSION: ICD-10-CM

## 2025-04-24 DIAGNOSIS — Z96.659 PRESENCE OF UNSPECIFIED ARTIFICIAL KNEE JOINT: Chronic | ICD-10-CM

## 2025-04-24 DIAGNOSIS — Z98.49 CATARACT EXTRACTION STATUS, UNSPECIFIED EYE: Chronic | ICD-10-CM

## 2025-04-24 DIAGNOSIS — E78.5 HYPERLIPIDEMIA, UNSPECIFIED: ICD-10-CM

## 2025-04-24 LAB
ANION GAP SERPL CALC-SCNC: 10 MMOL/L — SIGNIFICANT CHANGE UP (ref 5–17)
BASOPHILS # BLD AUTO: 0.02 K/UL — SIGNIFICANT CHANGE UP (ref 0–0.2)
BASOPHILS NFR BLD AUTO: 0.1 % — SIGNIFICANT CHANGE UP (ref 0–2)
BLD GP AB SCN SERPL QL: NEGATIVE — SIGNIFICANT CHANGE UP
BUN SERPL-MCNC: 16 MG/DL — SIGNIFICANT CHANGE UP (ref 7–23)
CALCIUM SERPL-MCNC: 8.7 MG/DL — SIGNIFICANT CHANGE UP (ref 8.4–10.5)
CHLORIDE SERPL-SCNC: 111 MMOL/L — HIGH (ref 96–108)
CO2 SERPL-SCNC: 19 MMOL/L — LOW (ref 22–31)
CREAT SERPL-MCNC: 0.6 MG/DL — SIGNIFICANT CHANGE UP (ref 0.5–1.3)
EGFR: 98 ML/MIN/1.73M2 — SIGNIFICANT CHANGE UP
EGFR: 98 ML/MIN/1.73M2 — SIGNIFICANT CHANGE UP
EOSINOPHIL # BLD AUTO: 0 K/UL — SIGNIFICANT CHANGE UP (ref 0–0.5)
EOSINOPHIL NFR BLD AUTO: 0 % — SIGNIFICANT CHANGE UP (ref 0–6)
GLUCOSE SERPL-MCNC: 139 MG/DL — HIGH (ref 70–99)
HCT VFR BLD CALC: 32 % — LOW (ref 34.5–45)
HGB BLD-MCNC: 10.5 G/DL — LOW (ref 11.5–15.5)
IMM GRANULOCYTES NFR BLD AUTO: 0.9 % — SIGNIFICANT CHANGE UP (ref 0–0.9)
LYMPHOCYTES # BLD AUTO: 0.9 K/UL — LOW (ref 1–3.3)
LYMPHOCYTES # BLD AUTO: 6.7 % — LOW (ref 13–44)
MCHC RBC-ENTMCNC: 30.4 PG — SIGNIFICANT CHANGE UP (ref 27–34)
MCHC RBC-ENTMCNC: 32.8 G/DL — SIGNIFICANT CHANGE UP (ref 32–36)
MCV RBC AUTO: 92.8 FL — SIGNIFICANT CHANGE UP (ref 80–100)
MONOCYTES # BLD AUTO: 0.26 K/UL — SIGNIFICANT CHANGE UP (ref 0–0.9)
MONOCYTES NFR BLD AUTO: 1.9 % — LOW (ref 2–14)
NEUTROPHILS # BLD AUTO: 12.16 K/UL — HIGH (ref 1.8–7.4)
NEUTROPHILS NFR BLD AUTO: 90.4 % — HIGH (ref 43–77)
NRBC BLD AUTO-RTO: 0 /100 WBCS — SIGNIFICANT CHANGE UP (ref 0–0)
PLATELET # BLD AUTO: 147 K/UL — LOW (ref 150–400)
POTASSIUM SERPL-MCNC: 4.2 MMOL/L — SIGNIFICANT CHANGE UP (ref 3.5–5.3)
POTASSIUM SERPL-SCNC: 4.2 MMOL/L — SIGNIFICANT CHANGE UP (ref 3.5–5.3)
RBC # BLD: 3.45 M/UL — LOW (ref 3.8–5.2)
RBC # FLD: 15.9 % — HIGH (ref 10.3–14.5)
RH IG SCN BLD-IMP: POSITIVE — SIGNIFICANT CHANGE UP
SODIUM SERPL-SCNC: 140 MMOL/L — SIGNIFICANT CHANGE UP (ref 135–145)
WBC # BLD: 13.46 K/UL — HIGH (ref 3.8–10.5)
WBC # FLD AUTO: 13.46 K/UL — HIGH (ref 3.8–10.5)

## 2025-04-24 PROCEDURE — 27280 ARTHR SI JT OPN B1GRF INSTRM: CPT | Mod: 50

## 2025-04-24 PROCEDURE — 61783 SCAN PROC SPINAL: CPT | Mod: 59

## 2025-04-24 PROCEDURE — 22853 INSJ BIOMECHANICAL DEVICE: CPT

## 2025-04-24 PROCEDURE — 20939 BONE MARROW ASPIR BONE GRFG: CPT

## 2025-04-24 PROCEDURE — 22214 INCIS 1 VERTEBRAL SEG LUMBAR: CPT

## 2025-04-24 PROCEDURE — 22216 INCIS ADDL SPINE SEGMENT: CPT

## 2025-04-24 PROCEDURE — 22614 ARTHRD PST TQ 1NTRSPC EA ADD: CPT

## 2025-04-24 PROCEDURE — 22633 ARTHRD CMBN 1NTRSPC LUMBAR: CPT

## 2025-04-24 PROCEDURE — 22848 INSERT PELV FIXATION DEVICE: CPT | Mod: 59

## 2025-04-24 PROCEDURE — 22634 ARTHRD CMBN 1NTRSPC EA ADDL: CPT

## 2025-04-24 PROCEDURE — 63052 LAM FACETC/FRMT ARTHRD LUM 1: CPT | Mod: 59

## 2025-04-24 PROCEDURE — 22842 INSERT SPINE FIXATION DEVICE: CPT

## 2025-04-24 DEVICE — IMPLANTABLE DEVICE: Type: IMPLANTABLE DEVICE | Status: FUNCTIONAL

## 2025-04-24 DEVICE — SCREW EVEREST POLY 6.5X40MM: Type: IMPLANTABLE DEVICE | Status: FUNCTIONAL

## 2025-04-24 DEVICE — SET SCREW EVEREST CUSTOM: Type: IMPLANTABLE DEVICE | Status: FUNCTIONAL

## 2025-04-24 DEVICE — SCREW EVEREST POLY 6.5X50MM: Type: IMPLANTABLE DEVICE | Status: FUNCTIONAL

## 2025-04-24 DEVICE — ROD 60MM: Type: IMPLANTABLE DEVICE | Status: FUNCTIONAL

## 2025-04-24 DEVICE — GRAFT BONE INFUSE KIT LG II: Type: IMPLANTABLE DEVICE | Status: FUNCTIONAL

## 2025-04-24 DEVICE — GRAFT VITOSIS BIMODAL 10CC: Type: IMPLANTABLE DEVICE | Status: FUNCTIONAL

## 2025-04-24 DEVICE — SCREW POLY 6.5X45MM: Type: IMPLANTABLE DEVICE | Status: FUNCTIONAL

## 2025-04-24 DEVICE — CAGE LORDOTIC FORZA XP 10.5X10X24MM: Type: IMPLANTABLE DEVICE | Status: FUNCTIONAL

## 2025-04-24 DEVICE — SURGIFLO MATRIX WITH THROMBIN KIT: Type: IMPLANTABLE DEVICE | Status: FUNCTIONAL

## 2025-04-24 DEVICE — SURGIFOAM 8 X 12.5CM X 10MM (100): Type: IMPLANTABLE DEVICE | Status: FUNCTIONAL

## 2025-04-24 DEVICE — ROD RAD 50MM: Type: IMPLANTABLE DEVICE | Status: FUNCTIONAL

## 2025-04-24 RX ORDER — APREPITANT 40 MG/1
40 CAPSULE ORAL ONCE
Refills: 0 | Status: COMPLETED | OUTPATIENT
Start: 2025-04-24 | End: 2025-04-24

## 2025-04-24 RX ORDER — ACETAMINOPHEN 500 MG/5ML
1000 LIQUID (ML) ORAL ONCE
Refills: 0 | Status: COMPLETED | OUTPATIENT
Start: 2025-04-24 | End: 2025-04-24

## 2025-04-24 RX ORDER — SODIUM CHLORIDE 9 G/1000ML
1000 INJECTION, SOLUTION INTRAVENOUS
Refills: 0 | Status: DISCONTINUED | OUTPATIENT
Start: 2025-04-24 | End: 2025-04-29

## 2025-04-24 RX ORDER — ATORVASTATIN CALCIUM 80 MG/1
40 TABLET, FILM COATED ORAL AT BEDTIME
Refills: 0 | Status: DISCONTINUED | OUTPATIENT
Start: 2025-04-24 | End: 2025-05-01

## 2025-04-24 RX ORDER — HYDROMORPHONE/SOD CHLOR,ISO/PF 2 MG/10 ML
0.5 SYRINGE (ML) INJECTION
Refills: 0 | Status: DISCONTINUED | OUTPATIENT
Start: 2025-04-24 | End: 2025-04-30

## 2025-04-24 RX ORDER — METHOCARBAMOL 500 MG/1
500 TABLET, FILM COATED ORAL EVERY 8 HOURS
Refills: 0 | Status: DISCONTINUED | OUTPATIENT
Start: 2025-04-24 | End: 2025-05-01

## 2025-04-24 RX ORDER — OXYCODONE HYDROCHLORIDE 30 MG/1
10 TABLET ORAL EVERY 4 HOURS
Refills: 0 | Status: DISCONTINUED | OUTPATIENT
Start: 2025-04-24 | End: 2025-04-30

## 2025-04-24 RX ORDER — PREGABALIN 75 MG/1
50 CAPSULE ORAL THREE TIMES A DAY
Refills: 0 | Status: DISCONTINUED | OUTPATIENT
Start: 2025-04-24 | End: 2025-04-30

## 2025-04-24 RX ORDER — ACETAMINOPHEN 500 MG/5ML
1000 LIQUID (ML) ORAL EVERY 8 HOURS
Refills: 0 | Status: DISCONTINUED | OUTPATIENT
Start: 2025-04-24 | End: 2025-05-01

## 2025-04-24 RX ORDER — BUPIVACAINE 13.3 MG/ML
20 INJECTION, SUSPENSION, LIPOSOMAL INFILTRATION ONCE
Refills: 0 | Status: DISCONTINUED | OUTPATIENT
Start: 2025-04-24 | End: 2025-04-24

## 2025-04-24 RX ORDER — OXYCODONE HYDROCHLORIDE 30 MG/1
5 TABLET ORAL EVERY 6 HOURS
Refills: 0 | Status: DISCONTINUED | OUTPATIENT
Start: 2025-04-24 | End: 2025-04-30

## 2025-04-24 RX ORDER — HYDROMORPHONE/SOD CHLOR,ISO/PF 2 MG/10 ML
0.5 SYRINGE (ML) INJECTION
Refills: 0 | Status: DISCONTINUED | OUTPATIENT
Start: 2025-04-24 | End: 2025-04-24

## 2025-04-24 RX ORDER — ONDANSETRON HCL/PF 4 MG/2 ML
4 VIAL (ML) INJECTION EVERY 6 HOURS
Refills: 0 | Status: DISCONTINUED | OUTPATIENT
Start: 2025-04-24 | End: 2025-05-01

## 2025-04-24 RX ORDER — SPIRONOLACTONE 25 MG
50 TABLET ORAL DAILY
Refills: 0 | Status: DISCONTINUED | OUTPATIENT
Start: 2025-04-24 | End: 2025-05-01

## 2025-04-24 RX ORDER — DEXAMETHASONE 0.5 MG/1
6 TABLET ORAL EVERY 6 HOURS
Refills: 0 | Status: COMPLETED | OUTPATIENT
Start: 2025-04-24 | End: 2025-04-25

## 2025-04-24 RX ORDER — POLYETHYLENE GLYCOL 3350 17 G/17G
17 POWDER, FOR SOLUTION ORAL DAILY
Refills: 0 | Status: DISCONTINUED | OUTPATIENT
Start: 2025-04-24 | End: 2025-05-01

## 2025-04-24 RX ORDER — SENNA 187 MG
2 TABLET ORAL AT BEDTIME
Refills: 0 | Status: DISCONTINUED | OUTPATIENT
Start: 2025-04-24 | End: 2025-05-01

## 2025-04-24 RX ORDER — ENOXAPARIN SODIUM 100 MG/ML
40 INJECTION SUBCUTANEOUS EVERY 24 HOURS
Refills: 0 | Status: DISCONTINUED | OUTPATIENT
Start: 2025-04-25 | End: 2025-04-29

## 2025-04-24 RX ORDER — CEFAZOLIN SODIUM IN 0.9 % NACL 3 G/100 ML
2000 INTRAVENOUS SOLUTION, PIGGYBACK (ML) INTRAVENOUS EVERY 8 HOURS
Refills: 0 | Status: COMPLETED | OUTPATIENT
Start: 2025-04-24 | End: 2025-04-25

## 2025-04-24 RX ADMIN — Medication 1 APPLICATION(S): at 06:30

## 2025-04-24 RX ADMIN — PREGABALIN 50 MILLIGRAM(S): 75 CAPSULE ORAL at 21:54

## 2025-04-24 RX ADMIN — Medication 2 TABLET(S): at 21:54

## 2025-04-24 RX ADMIN — Medication 1 APPLICATION(S): at 06:27

## 2025-04-24 RX ADMIN — APREPITANT 40 MILLIGRAM(S): 40 CAPSULE ORAL at 06:30

## 2025-04-24 RX ADMIN — METHOCARBAMOL 500 MILLIGRAM(S): 500 TABLET, FILM COATED ORAL at 21:53

## 2025-04-24 RX ADMIN — ATORVASTATIN CALCIUM 40 MILLIGRAM(S): 80 TABLET, FILM COATED ORAL at 21:53

## 2025-04-24 RX ADMIN — Medication 1000 MILLIGRAM(S): at 21:54

## 2025-04-24 RX ADMIN — DEXAMETHASONE 6 MILLIGRAM(S): 0.5 TABLET ORAL at 17:59

## 2025-04-24 RX ADMIN — SODIUM CHLORIDE 100 MILLILITER(S): 9 INJECTION, SOLUTION INTRAVENOUS at 21:53

## 2025-04-24 RX ADMIN — Medication 100 MILLIGRAM(S): at 21:53

## 2025-04-24 NOTE — BRIEF OPERATIVE NOTE - NSICDXBRIEFPROCEDURE_GEN_ALL_CORE_FT
PROCEDURES:  Fusion, spine, lumbar, TLIF, 1-2 levels 24-Apr-2025 16:26:03  Alise Whiteside  Fusion of lumbar spine using combined posterior and posterior interbody techniques with laminectomy 24-Apr-2025 16:26:17  Alise Whiteside

## 2025-04-24 NOTE — PROGRESS NOTE ADULT - SUBJECTIVE AND OBJECTIVE BOX
Ortho Post Op Check    Procedure: L3/L4 and L5/S1 laminectomies, TLIF, L2-pelvis psf   Surgeon: Dr. Velazco    Pt comfortable without complaints, pain controlled  Denies CP, SOB, N/V, numbness/tingling     Vital Signs Last 24 Hrs  T(C): 36.3 (04-24-25 @ 20:12), Max: 36.7 (04-24-25 @ 17:08)  T(F): 97.4 (04-24-25 @ 20:12), Max: 98 (04-24-25 @ 17:08)  HR: 84 (04-24-25 @ 20:12) (84 - 96)  BP: 97/64 (04-24-25 @ 20:12) (97/64 - 113/72)  BP(mean): 75 (04-24-25 @ 20:12) (75 - 88)  RR: 12 (04-24-25 @ 20:12) (10 - 18)  SpO2: 99% (04-24-25 @ 20:12) (94% - 99%)  I&O's Summary    24 Apr 2025 07:01  -  24 Apr 2025 22:25  --------------------------------------------------------  IN: 200 mL / OUT: 565 mL / NET: -365 mL        General: Pt Alert and oriented, NAD  DSG C/D/I, HVx2  Pulses intact   Sensation: SILT L2-S1  Motor: EHL/FHL/TA/GS 5/5                          10.5   13.46 )-----------( 147      ( 24 Apr 2025 17:34 )             32.0     04-24    140  |  111[H]  |  16  ----------------------------<  139[H]  4.2   |  19[L]  |  0.60    Ca    8.7      24 Apr 2025 17:34          A/P: 69yFemale POD#0 s/p L3/L4 and L5/S1 laminectomies, TLIF, L2-pelvis psf     - Stable  - Pain Control  - DVT ppx: SCDs, LVX POD 1  - Post op abx: Ancef x 2 doses  - PT, WBS: WBAT  - Dispo: Pending PT eval      Ortho Pager [phone]

## 2025-04-24 NOTE — PACU DISCHARGE NOTE - MENTAL STATUS: PATIENT PARTICIPATION
Discharge Instructions for  [facility]  [address]  [facility] [place] [facility] [address] [facility] [place], [place]  Telephone: [phone]    NAME:  Kevin Graves OF BIRTH:  2000  MEDICAL RECORD NUMBER:  [id number]  DATE:  9/22/2021  WOUND CARE ORDERS:    Right lower leg wound - cleanse with saline, pat dry, apply nickel thick layer of Santyl ointment, cover with NS moistened gauze, then ABD, secure with roll gauze and tape. (patient will continue to use silvadene cream at home until Santyl ointment arrives). Apply single layer tubigrip size F from toes to just below the knee. Change dressing daily. Follow-up in 1 week. TREATMENT ORDERS:    Elevate leg(s) above the level of the heart when sitting. Avoid prolonged standing in one place. Do no get dressing/wrap wet. Follow Diet as prescribed:   [] Diet as tolerated: [] Calorie Diabetic Diet: Low carb and no Sugar [] No Added Salt:  [] Increase Protein: [] Limit the amount of liquid you are drinking and avoid drinking in between meals           Return Appointment:  [] Return Appointment: With {[id number]}  in  *** Week(s)  [] Nurse Visit : *** days  [] Ordered tests:    Electronically signed Warden Minerva RN on 9/22/2021 at 2:59 PM     Felicia Tristan 281: Should you experience any significant changes in your wound(s) or have questions about your wound care, please contact the [address] [place] [address] at [address] [place] [address] 8:00 am - 4:30. If you need help with your wound outside these hours and cannot wait until we are again available, contact your PCP or go to the hospital emergency room. PLEASE NOTE: IF YOU ARE UNABLE TO OBTAIN WOUND SUPPLIES, CONTINUE TO USE THE SUPPLIES YOU HAVE AVAILABLE UNTIL YOU ARE ABLE TO REACH US. IT IS MOST IMPORTANT TO KEEP THE WOUND COVERED AT ALL TIMES.      Physician Signature:_______________________    Date: ___________ Time:  ____________
Awake

## 2025-04-25 ENCOUNTER — TRANSCRIPTION ENCOUNTER (OUTPATIENT)
Age: 70
End: 2025-04-25

## 2025-04-25 LAB
ANION GAP SERPL CALC-SCNC: 11 MMOL/L — SIGNIFICANT CHANGE UP (ref 5–17)
BASOPHILS # BLD AUTO: 0.01 K/UL — SIGNIFICANT CHANGE UP (ref 0–0.2)
BASOPHILS NFR BLD AUTO: 0.1 % — SIGNIFICANT CHANGE UP (ref 0–2)
BUN SERPL-MCNC: 19 MG/DL — SIGNIFICANT CHANGE UP (ref 7–23)
CALCIUM SERPL-MCNC: 8.7 MG/DL — SIGNIFICANT CHANGE UP (ref 8.4–10.5)
CHLORIDE SERPL-SCNC: 107 MMOL/L — SIGNIFICANT CHANGE UP (ref 96–108)
CO2 SERPL-SCNC: 21 MMOL/L — LOW (ref 22–31)
CREAT SERPL-MCNC: 0.75 MG/DL — SIGNIFICANT CHANGE UP (ref 0.5–1.3)
EGFR: 87 ML/MIN/1.73M2 — SIGNIFICANT CHANGE UP
EGFR: 87 ML/MIN/1.73M2 — SIGNIFICANT CHANGE UP
EOSINOPHIL # BLD AUTO: 0 K/UL — SIGNIFICANT CHANGE UP (ref 0–0.5)
EOSINOPHIL NFR BLD AUTO: 0 % — SIGNIFICANT CHANGE UP (ref 0–6)
GLUCOSE SERPL-MCNC: 129 MG/DL — HIGH (ref 70–99)
HCT VFR BLD CALC: 29.8 % — LOW (ref 34.5–45)
HGB BLD-MCNC: 9.9 G/DL — LOW (ref 11.5–15.5)
IMM GRANULOCYTES NFR BLD AUTO: 0.5 % — SIGNIFICANT CHANGE UP (ref 0–0.9)
LYMPHOCYTES # BLD AUTO: 0.78 K/UL — LOW (ref 1–3.3)
LYMPHOCYTES # BLD AUTO: 6.8 % — LOW (ref 13–44)
MCHC RBC-ENTMCNC: 30.7 PG — SIGNIFICANT CHANGE UP (ref 27–34)
MCHC RBC-ENTMCNC: 33.2 G/DL — SIGNIFICANT CHANGE UP (ref 32–36)
MCV RBC AUTO: 92.3 FL — SIGNIFICANT CHANGE UP (ref 80–100)
MONOCYTES # BLD AUTO: 0.47 K/UL — SIGNIFICANT CHANGE UP (ref 0–0.9)
MONOCYTES NFR BLD AUTO: 4.1 % — SIGNIFICANT CHANGE UP (ref 2–14)
NEUTROPHILS # BLD AUTO: 10.13 K/UL — HIGH (ref 1.8–7.4)
NEUTROPHILS NFR BLD AUTO: 88.5 % — HIGH (ref 43–77)
NRBC BLD AUTO-RTO: 0 /100 WBCS — SIGNIFICANT CHANGE UP (ref 0–0)
PLATELET # BLD AUTO: 152 K/UL — SIGNIFICANT CHANGE UP (ref 150–400)
POTASSIUM SERPL-MCNC: 4.4 MMOL/L — SIGNIFICANT CHANGE UP (ref 3.5–5.3)
POTASSIUM SERPL-SCNC: 4.4 MMOL/L — SIGNIFICANT CHANGE UP (ref 3.5–5.3)
RBC # BLD: 3.23 M/UL — LOW (ref 3.8–5.2)
RBC # FLD: 16.1 % — HIGH (ref 10.3–14.5)
SODIUM SERPL-SCNC: 139 MMOL/L — SIGNIFICANT CHANGE UP (ref 135–145)
WBC # BLD: 11.45 K/UL — HIGH (ref 3.8–10.5)
WBC # FLD AUTO: 11.45 K/UL — HIGH (ref 3.8–10.5)

## 2025-04-25 PROCEDURE — 99223 1ST HOSP IP/OBS HIGH 75: CPT

## 2025-04-25 RX ADMIN — SODIUM CHLORIDE 100 MILLILITER(S): 9 INJECTION, SOLUTION INTRAVENOUS at 21:28

## 2025-04-25 RX ADMIN — Medication 1000 MILLILITER(S): at 17:37

## 2025-04-25 RX ADMIN — Medication 1000 MILLIGRAM(S): at 21:29

## 2025-04-25 RX ADMIN — ATORVASTATIN CALCIUM 40 MILLIGRAM(S): 80 TABLET, FILM COATED ORAL at 21:29

## 2025-04-25 RX ADMIN — DEXAMETHASONE 6 MILLIGRAM(S): 0.5 TABLET ORAL at 00:27

## 2025-04-25 RX ADMIN — Medication 4 MILLIGRAM(S): at 11:06

## 2025-04-25 RX ADMIN — SODIUM CHLORIDE 100 MILLILITER(S): 9 INJECTION, SOLUTION INTRAVENOUS at 06:19

## 2025-04-25 RX ADMIN — DEXAMETHASONE 6 MILLIGRAM(S): 0.5 TABLET ORAL at 11:05

## 2025-04-25 RX ADMIN — PREGABALIN 50 MILLIGRAM(S): 75 CAPSULE ORAL at 06:06

## 2025-04-25 RX ADMIN — ENOXAPARIN SODIUM 40 MILLIGRAM(S): 100 INJECTION SUBCUTANEOUS at 21:29

## 2025-04-25 RX ADMIN — OXYCODONE HYDROCHLORIDE 10 MILLIGRAM(S): 30 TABLET ORAL at 11:06

## 2025-04-25 RX ADMIN — POLYETHYLENE GLYCOL 3350 17 GRAM(S): 17 POWDER, FOR SOLUTION ORAL at 11:06

## 2025-04-25 RX ADMIN — METHOCARBAMOL 500 MILLIGRAM(S): 500 TABLET, FILM COATED ORAL at 06:07

## 2025-04-25 RX ADMIN — Medication 50 MILLIGRAM(S): at 06:07

## 2025-04-25 RX ADMIN — OXYCODONE HYDROCHLORIDE 10 MILLIGRAM(S): 30 TABLET ORAL at 12:06

## 2025-04-25 RX ADMIN — DEXAMETHASONE 6 MILLIGRAM(S): 0.5 TABLET ORAL at 06:11

## 2025-04-25 RX ADMIN — Medication 2 TABLET(S): at 21:28

## 2025-04-25 RX ADMIN — Medication 4 MILLIGRAM(S): at 17:05

## 2025-04-25 RX ADMIN — METHOCARBAMOL 500 MILLIGRAM(S): 500 TABLET, FILM COATED ORAL at 13:48

## 2025-04-25 RX ADMIN — Medication 4 MILLIGRAM(S): at 06:06

## 2025-04-25 RX ADMIN — Medication 1000 MILLIGRAM(S): at 06:06

## 2025-04-25 RX ADMIN — Medication 1000 MILLIGRAM(S): at 13:47

## 2025-04-25 RX ADMIN — METHOCARBAMOL 500 MILLIGRAM(S): 500 TABLET, FILM COATED ORAL at 21:29

## 2025-04-25 RX ADMIN — PREGABALIN 50 MILLIGRAM(S): 75 CAPSULE ORAL at 21:29

## 2025-04-25 RX ADMIN — SODIUM CHLORIDE 100 MILLILITER(S): 9 INJECTION, SOLUTION INTRAVENOUS at 11:05

## 2025-04-25 RX ADMIN — Medication 100 MILLIGRAM(S): at 06:06

## 2025-04-25 RX ADMIN — PREGABALIN 50 MILLIGRAM(S): 75 CAPSULE ORAL at 13:48

## 2025-04-25 NOTE — PHYSICAL THERAPY INITIAL EVALUATION ADULT - CRITERIA FOR SKILLED THERAPEUTIC INTERVENTIONS
Patient : Kirill Bhakta Age: 27 year old Sex: male   MRN: [id number] Encounter Date: 7/7/2021      History   CHIEF COMPLAINT    Chief Complaint   Patient presents with   • Abdominal Pain       HPI    Kirill Bhakta is a 27 year old male with a PMH of alcohol abuse, alcohol pancreatitis who presents to the ED with epigastric abdominal pain going to the back.  Started yesterday and worsened today so he came in to be evaluated.  Endorses nausea, 1 episode of nonbloody nonbilious emesis.  Regular bowel movements.  No urinary symptoms.  Endorses cocaine and marijuana use, no alcohol today.  States that he drinks daily and his last drink was yesterday, no history of alcohol withdraw.  This feels exactly same as pancreatitis flare-ups in the past.  Denies any chest pain or shortness of breath.    ALLERGIES:  No Known Allergies    Prior to Admission Medications    HYDROCODONE-ACETAMINOPHEN (NORCO) 5-325 MG PER TABLET    Take 1 tablet by mouth every 6 hours as needed for Pain.       New Prescriptions    No medications on file       Past Medical History:   Diagnosis Date   • Pancreatitis        History reviewed. No pertinent surgical history.    History reviewed. No pertinent family history.    Social History     Tobacco Use   • Smoking status: Current Every Day Smoker     Packs/day: 0.50     Years: 4.00     Pack years: 2.00     Types: Cigarettes   • Smokeless tobacco: Never Used   Substance Use Topics   • Alcohol use: Yes     Comment: occasional   • Drug use: Yes     Frequency: 4.0 times per week     Types: Marijuana       Constitutional:  Denies fever or chills.   HENT:  Denies nasal congestion or sore throat.   Respiratory:  Denies cough or shortness of breath.   Cardiovascular:  Denies chest pain   GI:  See HPI  :  Denies dysuria.   Musculoskeletal:  See HPI  Integument:  Denies rash.   Neurologic:  Denies headache   Lymphatic:  Denies swollen glands.   Psychiatric:  Denies depression or anxiety.     Physical Exam      Vitals:    07/07/21 2112 07/07/21 2201 07/07/21 2230 07/07/21 2300   BP:  (!) 148/96 131/78 (!) 141/85   Pulse: 66 72 (!) 51 (!) 55   Resp:       Temp:       TempSrc:       SpO2: 100% 99% 98% 98%   Weight:       Height:           Physical Exam  Vitals and nursing note reviewed.   Constitutional:       General: He is not in acute distress.     Appearance: Normal appearance. He is well-developed.      Comments: Uncomfortable appearing   HENT:      Head: Normocephalic and atraumatic.      Right Ear: External ear normal.      Left Ear: External ear normal.      Nose: Nose normal.   Eyes:      Conjunctiva/sclera: Conjunctivae normal.   Neck:      Trachea: No tracheal deviation.   Cardiovascular:      Rate and Rhythm: Normal rate and regular rhythm.      Pulses: Normal pulses.      Heart sounds: Normal heart sounds. No murmur heard.   No friction rub. No gallop.    Pulmonary:      Effort: Pulmonary effort is normal. No respiratory distress.      Breath sounds: Normal breath sounds. No wheezing or rales.   Chest:      Chest wall: No tenderness.   Abdominal:      General: Bowel sounds are normal. There is no distension.      Palpations: Abdomen is soft.      Tenderness: There is abdominal tenderness in the right upper quadrant, epigastric area and periumbilical area. There is no right CVA tenderness or left CVA tenderness.   Musculoskeletal:      Cervical back: Normal range of motion and neck supple.   Lymphadenopathy:      Cervical: No cervical adenopathy.   Skin:     General: Skin is warm and dry.      Capillary Refill: Capillary refill takes less than 2 seconds.      Findings: No rash.   Neurological:      General: No focal deficit present.      Mental Status: He is alert and oriented to person, place, and time.      Comments: No Focal Deficits   Psychiatric:         Mood and Affect: Mood normal.               RADIOLOGY    US Liver / Gallbladder / Pancreas   Final Result   IMPRESSION:   1. Hepatic steatosis.   2. No  evidence of gallstones or secondary findings of acute cholecystitis.   3. No significant intrahepatic biliary dilatation. The common bile duct   measures up to 9 mm. This was noted to be prominent on patient's previous   studies due to inflammation in the head of the pancreas. Pancreas could not   be evaluated on today's study due to overlying bowel gas.               LABS    Results for orders placed or performed during the hospital encounter of 07/07/21   Comprehensive Metabolic Panel   Result Value    Fasting Status     Sodium 141    Potassium 3.5    Chloride 105    Carbon Dioxide 22    Anion Gap 18    Glucose 101 (H)    BUN 7    Creatinine 0.88    Glomerular Filtration Rate >90     Comment: eGFR results = or >90 mL/min/1.73m2 = Normal kidney function.    BUN/ Creatinine Ratio 8    Calcium 9.7    Bilirubin, Total 1.0    GOT/ (H)    GPT/ALT 47    Alkaline Phosphatase 173 (H)    Albumin 4.2    Protein, Total 7.3    Globulin 3.1    A/G Ratio 1.4   Lipase   Result Value    Lipase 1,096 (H)   Alcohol   Result Value    Alcohol None Detected   CBC with Automated Differential (performable only)   Result Value    WBC 6.9    RBC 4.36 (L)    HGB 14.3    HCT 41.2    MCV 94.5    MCH 32.8    MCHC 34.7    RDW-CV 12.0    RDW-SD 42.0        NRBC 0    Neutrophil, Percent 74    Lymphocytes, Percent 19    Mono, Percent 6    Eosinophils, Percent 1    Basophils, Percent 0    Immature Granulocytes 0    Absolute Neutrophils 5.1    Absolute Lymphocytes 1.3    Absolute Monocytes 0.4    Absolute Eosinophils  0.0    Absolute Basophils 0.0    Absolute Immmature Granulocytes 0.0   Magnesium   Result Value    Magnesium 1.5 (L)         PROCEDURES            ED Course     27-year-old with history of alcohol pancreatitis and alcohol abuse presents the ER with abdominal pain rated his back is exactly same as his previous pancreatitis flare-ups, recent hospitalization reviewed, continues to drink alcohol daily, last drink was  yesterday, not acutely withdrawing at this time, obtain labs, fluids, pain control reassess.      8:44 PM  Recheck on patient.  Magnesium supplementation order, lipase is elevated, AST and alkaline phosphatase slightly elevated, ultrasound ordered, will continue to monitor        ED Course as of Jul 07 2332 Wed Jul 07, 2021 2154 Patient requesting more pain medicine, states that the morphine did not help very much and just made him drowsy, Toradol ordered.    [BJ]      ED Course User Index  [BJ] Castillo Michael PA-C       11:30 PM  Recheck on patient.  Slightly improved with Toradol, unable tolerate p.o. here, will be admitted for further management pain control and bowel rest.  Is not vaccinated, COVID swab ordered.  Spoke with hospitalist, Dr. Qiu, accepts admission.  Patient informed elevated LFTs.      Impression:  The encounter diagnosis was Alcohol-induced acute pancreatitis, unspecified complication status.    Follow Up:  No follow-up provider specified.     New Prescriptions    No medications on file           Pt is admitted in stable condition.      Signed:  Castillo Michael PA-C  7/7/2021  Patient seen under the supervision of Dr. Alexx Michael PA-C  07/07/21 7867     impairments found/functional limitations in following categories/rehab potential/therapy frequency/anticipated equipment needs at discharge/anticipated discharge recommendation No

## 2025-04-25 NOTE — DISCHARGE NOTE PROVIDER - NSDCFUSCHEDAPPT_GEN_ALL_CORE_FT
Jose Velazco  [facility] Physician [facility]  ORTHOSURG [address]  Scheduled Appointment: 05/14/2025     Jose Velazco  [facility] Physician formerly [facility]  ORTHOSURG [address]  Scheduled Appointment: 06/11/2025

## 2025-04-25 NOTE — DISCHARGE NOTE PROVIDER - NSDCMRMEDTOKEN_GEN_ALL_CORE_FT
acetaminophen 500 mg oral tablet: 2 tab(s) orally every 8 hours  atorvastatin 40 mg oral tablet: 1 tab(s) orally once a day (at bedtime)  celecoxib 200 mg oral capsule: 1 cap(s) orally every 12 hours MDD: 2  Multiple Vitamins oral tablet: 1 tab(s) orally once a day  spironolactone 50 mg oral tablet: 1 tablet orally once a day . Continue to take your blood pressure at home and follow-up with your primary care provider.  vitamin c: 500 milligram(s) orally once a day   acetaminophen 500 mg oral tablet: 2 tab(s) orally every 8 hours MDD: 3  atorvastatin 40 mg oral tablet: 1 tab(s) orally once a day (at bedtime)  Eliquis Starter Pack for Treatment of DVT and PE 5 mg oral tablet: 1 tab(s) orally 2 times a day Day 1 5-1-2025- eliquis 10mg PO BID x7 days- 5-1through 5-18 and then 5mg Po BID x 3 months MDD: 2  methocarbamol 500 mg oral tablet: 1 tab(s) orally every 8 hours MDD: 3  Multiple Vitamins oral tablet: 1 tab(s) orally once a day  oxyCODONE 5 mg oral tablet: 1 tab(s) orally every 6 hours as needed for  severe pain MDD: 4  pantoprazole 40 mg oral delayed release tablet: 1 tab(s) orally once a day MDD: 1  pregabalin 50 mg oral capsule: 1 cap(s) orally 3 times a day MDD: 3  senna leaf extract oral tablet: 2 tab(s) orally once a day (at bedtime) MDD: 1  spironolactone 25 mg oral tablet: 2 tab(s) orally once a day  vitamin c: 500 milligram(s) orally once a day

## 2025-04-25 NOTE — SBIRT NOTE ADULT - NSSBIRTBRIEFINTDET_GEN_A_CORE
Patient scored 18 on AUDIT. Patient stated she consumes beer when she drinks and drinks alcohol almost daily. Patient states she consumes beer to help the pain she feels. Patient disclosed growing up in a haphazard family but disclosed that all of her family has passed away. Patient added that losing her brother and sister were the most difficult. Patient has tried counseling/ therapy but did not find it helpful and was not interested in any alcohol use resources at this time when SW offered.

## 2025-04-25 NOTE — DISCHARGE NOTE PROVIDER - NSDCCPCAREPLAN_GEN_ALL_CORE_FT
PRINCIPAL DISCHARGE DIAGNOSIS  Diagnosis: H/O low back pain  Assessment and Plan of Treatment: L3/L4 and L5/S1 laminectomies, TLIF, L2-pelvis psf on 4/24

## 2025-04-25 NOTE — DISCHARGE NOTE PROVIDER - NSDCCPTREATMENT_GEN_ALL_CORE_FT
PRINCIPAL PROCEDURE  Procedure: Fusion, spine, lumbar, TLIF, 1-2 levels  Findings and Treatment:       SECONDARY PROCEDURE  Procedure: Fusion of lumbar spine using combined posterior and posterior interbody techniques with laminectomy  Findings and Treatment:

## 2025-04-25 NOTE — DISCHARGE NOTE PROVIDER - CARE PROVIDER_API CALL
Jose Velazco  Orthopaedic Surgery  [address]  [place] [address]  Phone: [phone]  Fax: [phone]  Follow Up Time: 2 weeks

## 2025-04-25 NOTE — PROGRESS NOTE ADULT - SUBJECTIVE AND OBJECTIVE BOX
Ortho Note    Subjective:  Pt comfortable without complaints, pain controlled with current pain medication regimen  Denies CP, SOB, N/V, numbness/tingling   Reviewed plan of care with patient at bedside    Vital Signs Last 24 Hrs  T(C): 36.9 (04-25-25 @ 08:38), Max: 36.9 (04-25-25 @ 08:38)  T(F): 98.4 (04-25-25 @ 08:38), Max: 98.4 (04-25-25 @ 08:38)  HR: 89 (04-25-25 @ 08:38) (87 - 89)  BP: 95/63 (04-25-25 @ 08:38) (95/63 - 103/60)  BP(mean): --  RR: 18 (04-25-25 @ 08:38) (18 - 18)  SpO2: 97% (04-25-25 @ 08:38) (97% - 98%)  AVSS    Objective:    Physical Exam:  General: Pt Alert and oriented, NAD  DSG C/D/I, HVx2  Pulses intact bilateral LE  Sensation: SILT L2-S1  Motor: EHL/FHL/TA/GS 5/5          Plan of Care:  A/P: 69yFemale POD#1 s/p L3/L4 and L5/S1 laminectomies, TLIF, L2-pelvis psf   - afebrile, wbcs 11.45  - Pain Control- tylenol 1000mg PO Q8h, Dilaudid 0.5mg Q3h prn breakthrough pain, methocabramol 500mg PO Q8h, Oxycodone 5-10mg Po Q4h prn moderate to severe pain, lyrica 50mg Po TID   - DVT ppx: Lovenox 40mg Subq Q24h  - PT, WBS: WBAT  - appreciate medicine recs  - bowel regimen, IS use, PPI   - continue hx2,   - standing xray when patient can tolerate  - Dispo- home pending pt clearance, dc of drains standing xray     Ortho Pager [phone] Ortho Note    Subjective:  Pt comfortable without complaints, pain controlled with current pain medication regimen  Denies CP, SOB, N/V, numbness/tingling   Reviewed plan of care with patient at bedside    Vital Signs Last 24 Hrs  T(C): 36.9 (04-25-25 @ 08:38), Max: 36.9 (04-25-25 @ 08:38)  T(F): 98.4 (04-25-25 @ 08:38), Max: 98.4 (04-25-25 @ 08:38)  HR: 89 (04-25-25 @ 08:38) (87 - 89)  BP: 95/63 (04-25-25 @ 08:38) (95/63 - 103/60)  BP(mean): --  RR: 18 (04-25-25 @ 08:38) (18 - 18)  SpO2: 97% (04-25-25 @ 08:38) (97% - 98%)  AVSS    Objective:    Physical Exam:  General: Pt Alert and oriented, NAD  DSG C/D/I, HVx2  Pulses intact bilateral LE  Sensation: SILT L2-S1  Motor: EHL/FHL/TA/GS 5/5  morales        Plan of Care:  A/P: 69yFemale POD#1 s/p L3/L4 and L5/S1 laminectomies, TLIF, L2-pelvis psf   - afebrile, wbcs 11.45  - Pain Control- tylenol 1000mg PO Q8h, Dilaudid 0.5mg Q3h prn breakthrough pain, methocarbamol 500mg PO Q8h, Oxycodone 5-10mg Po Q4h prn moderate to severe pain, lyrica 50mg Po TID   - DVT ppx: Lovenox 40mg Subq Q24h  - PT, WBS: WBAT  - appreciate medicine recs  - bowel regimen, IS use, PPI   - continue hx2,   - standing xray when patient can tolerate  - discontinue morales - TOV- 150cc noted in morales, bladder scan q6h and 1000 ml ns bolus   - Dispo- home pending pt clearance, dc of drains standing xray     Ortho Pager [phone]

## 2025-04-25 NOTE — PHYSICAL THERAPY INITIAL EVALUATION ADULT - PERTINENT HX OF CURRENT PROBLEM, REHAB EVAL
68yo f c/o back pain that is acute on chronic. Pt reports she had chronic low back pain that was treated with a spinal fusion with Dr Velazco 5 years ago. At that time she recovered well.  Overtime her knees became painful so she underwent a left TKA 2 years ago, and a right TKA 1 year ago with Dr. Mitchell.  In the last year, her low back pain has been progressing once again. It does not radiate down her legs. Noticing some tingling to her left toes intermittently lately. Denies weakness. Ambulates with a cane at baseline. States she did not need prescription strength opioids after 2 weeks postop from her knee replacements  and now takes tylenol and celebrex for pain.

## 2025-04-25 NOTE — DISCHARGE NOTE PROVIDER - NSDCFUADDINST_GEN_ALL_CORE_FT
ACTIVITY:     - No extreme bending, extending, turning, twisting, or straining. No strenuous activity, heavy lifting, driving or returning to work until cleared by your surgeon.           DRESSING/SHOWERING:      -You have an aquacel dressing. You may shower. Keep dressing on for 7-10 days after surgery, then it may be removed. Once dressing removed keep incision clean and dry.  It will be removed at your follow-up.  Do not pick at your incision. Do not apply creams, ointments or oils to your incision until cleared by your surgeon. Do not soak your incision in sitting water (ie tubs, pools, lakes, etc.) until cleared by your surgeon.            MEDICATION/ANTICOAGULATION:     - You have been prescribed medications for pain:       - Tylenol (Acetaminophen) for mild to moderate pain. Do not exceed 3,000mg daily.       - For more severe pain, you may continue to take the Tylenol with the addition of narcotic pain medication. Take this medication as prescribed. Do not take more than prescribed. Note that this medication may cause drowsiness or dizziness. Do not operate machinery. This medication may cause constipation.     - If you have been prescribed a muscle relaxer, take this medication as needed for muscle spasm. Follow instructions on bottle.     - Try to have regular bowel movements. Take stool softener or laxative if necessary. You may wish to take Miralax daily until you have regular bowel movements.      - Do not take antiinflammatories (Aleve, Advil, Naproxen, Ibuprofen, etc.) until cleared by your surgeon. Tylenol is not an anti-inflammatory and okay to take (see above).     - If you have a pain management physician, please follow-up with them postoperatively.      - If you experience any negative side effects of your medications, please call your surgeon's office to discuss.           FOLLOW UP:     - Call to schedule an appt with Dr. Velazco for follow up.      - Please follow-up with your primary care physician or any other specialist you see postoperatively, if needed.      - Contact your doctor or go to the emergency room if you experience: fever greater than 101.5, chills, chest pain, difficulty breathing, redness or excessive drainage around the incision, other concerns.   ACTIVITY:     - No extreme bending, extending, turning, twisting, or straining. No strenuous activity, heavy lifting, driving or returning to work until cleared by your surgeon.           DRESSING/SHOWERING:      -You have an aquacel dressing. You may shower. Keep dressing on for 7-10 days after surgery, then it may be removed. Once dressing removed keep incision clean and dry.  It will be removed at your follow-up.  Do not pick at your incision. Do not apply creams, ointments or oils to your incision until cleared by your surgeon. Do not soak your incision in sitting water (ie tubs, pools, lakes, etc.) until cleared by your surgeon.            MEDICATION/ANTICOAGULATION:     - You have been prescribed medications for pain:       - Tylenol (Acetaminophen) for mild to moderate pain. Do not exceed 3,000mg daily.       - For more severe pain, you may continue to take the Tylenol with the addition of narcotic pain medication. Take this medication as prescribed. Do not take more than prescribed. Note that this medication may cause drowsiness or dizziness. Do not operate machinery. This medication may cause constipation.     - If you have been prescribed a muscle relaxer, take this medication as needed for muscle spasm. Follow instructions on bottle.     - Try to have regular bowel movements. Take stool softener or laxative if necessary. You may wish to take Miralax daily until you have regular bowel movements.      - Do not take antiinflammatories (Aleve, Advil, Naproxen, Ibuprofen, etc.) until cleared by your surgeon. Tylenol is not an anti-inflammatory and okay to take (see above).     - If you have a pain management physician, please follow-up with them postoperatively.      - If you experience any negative side effects of your medications, please call your surgeon's office to discuss.           FOLLOW UP:     - Call to schedule an appt with Dr. Velazco for follow up.      - Please follow-up with your primary care physician or any other specialist you see postoperatively, if needed.      - Contact your doctor or go to the emergency room if you experience: fever greater than 101.5, chills, chest pain, difficulty breathing, redness or excessive drainage around the incision, other concerns.    You received x2 blood transfusions this admission related to blood loss from surgery. The discharge hemoglobin was 8.9    A CT angiogram was performed of your lungs and it found it showed- a right posterior basal segmental branch   of the right lower lobe pulmonary artery. You were started on eliquis 10mg PO twice a day as a blood thinner and 5mg twice a day for 3 months. - Day 1, 5-1-2025- eliquis 10mg PO twice a day  x7 days- 5-1 through 5-8 and then 5mg Po twice a day x 3 months  Followup with your primary care doctor for further management and workup

## 2025-04-25 NOTE — CONSULT NOTE ADULT - ASSESSMENT
69F with PMH of HTN, HLD, obesity, OA of the hips and knees, presenting for elective L3-L4, L5-S1 lami, PSF L3-S1, TLIF L5-S1, L3-pelvis osteotomies.    #Post-operative state  - plan per orthopedic team  - still has morales in place, pending PT and TOV when appropriate  - bowel regimen with senna and miralax  - pain control: robaxin 500mg every 8 hours, ocy 5 every 6 hours as needed for moderate pain, oxy 10mg every 4 hours for severe pain, dilaudid 0.5mg IV every 3 hours prn for breakthrough pain, tylenol 1000mg every 8 hours  - pregabalin 50mg three times a day    #HTN  #HLD  - continue atorvastatin 40mg daily   - continue spironolactone 50mg daily    #Leukocytosis  #Acute blood loss anemia - post-operative  - leukocytosis likely reactive from recent surgery  - anemia related to recent surgery as well- continue to monitor  - transfuse for Hb < 7   - maintain active type and screen and monitor for any signs/symptoms of bleeding    75 minutes spent on this encounter, including face to face with patient, review of cahrt, care coordination and documentation.  Plan discussed with orthopedic team.

## 2025-04-25 NOTE — SBIRT NOTE ADULT - NSSBIRTDRGPOSREINDET_GEN_A_CORE
Patient states she does not use drugs other than those required for medical reasons. SW encouraged patient to continue this.

## 2025-04-25 NOTE — DISCHARGE NOTE PROVIDER - HOSPITAL COURSE
Admitted 4/24/25  Surgery L3/L4 and L5/S1 laminectomies, TLIF, L2-pelvis psf on 4/24  Rosanne-op Antibiotics  Pain control  DVT prophylaxis- lovenox, SCDs  OOB/Physical Therapy   Admitted 4/24/25  Surgery L3/L4 and L5/S1 laminectomies, TLIF, L2-pelvis psf on 4/24  Rosanne-op Antibiotics  Pain control  DVT prophylaxis- lovenox, SCDs  OOB/Physical Therapy      HVS dcd when output was sufficient  Standing xray completed- lumbar spine  Low grade temperature on 4- with tachycardia and desaturation while laying down- RVP negative, chest xray showing atelectasis, ua negative  CTA ordered-       1. Pulmonary embolism involving a right posterior basal segmental branch   of the right lower lobe pulmonary artery. Small clot burden. No signs of   right heart strain.    2. Bibasilar linear atelectasis/scarring possibly the sequela of prior   bibasilar consolidation. No evidence of pulmonary hemorrhage or discrete   pulmonary infarction.    3. Borderline dilated main pulmonary artery, as may be seen in setting of   pulmonary hypertension. No significant right ventricular strain.  Started on eliquis 10mg Po BID x7 days and 3 months supply

## 2025-04-25 NOTE — PROGRESS NOTE ADULT - SUBJECTIVE AND OBJECTIVE BOX
Ortho Progress Note    Procedure: L2-Pelvis PSF on 4/24  Surgeon: Dr. ELVA Velazco    Pt comfortable without complaints, pain controlled. Back feels stiff  Denies CP, SOB, N/V, numbness/tingling     Vital Signs Last 24 Hrs  T(C): 36.7 (04-25-25 @ 00:33), Max: 36.7 (04-25-25 @ 00:33)  T(F): 98 (04-25-25 @ 00:33), Max: 98 (04-25-25 @ 00:33)  HR: 91 (04-25-25 @ 00:33) (91 - 91)  BP: 97/69 (04-25-25 @ 00:33) (97/69 - 97/69)  BP(mean): --  RR: 18 (04-25-25 @ 00:33) (18 - 18)  SpO2: 98% (04-25-25 @ 00:33) (98% - 98%)    General: Pt Alert and oriented, NAD  Drains: HVx2  Pulses: 2+ DP  Sensation: SILT grossly L2-S1 bilaterally  Motor: 5/5 L2-S1 bilaterally    A/P: 69yFemale s/p L2-Pelvis PSF by Dr. ELVA Velazco on 04-24  - Stable  - Pain Control  - DVT ppx: SCDs, LVX to start tonight 10PM  - Post op abx: Ancef  - Drains: HVx2  - WBS: WBAT  - PT eval today  - TOV

## 2025-04-25 NOTE — CONSULT NOTE ADULT - SUBJECTIVE AND OBJECTIVE BOX
See below for orthopedic HPI.  Patient was cleared for this surgery by outpatient provider.     "68yo f c/o back pain that is acute on chronic. Pt reports she had chronic low back pain that was treated with a spinal fusion with Dr Velazco 5 years ago. At that time she recovered well.  Overtime her knees became painful so she underwent a left TKA 2 years ago, and a right TKA 1 year ago with Dr. Mitchell.  In the last year, her low back pain has been progressing once again. It does not radiate down her legs. Noticing some tingling to her left toes intermittently lately. Denies weakness. Ambulates with a cane at baseline. States she did not need prescription strength opioids after 2 weeks postop from her knee replacements  and now takes tylenol and celebrex for pain.     Presents today for elective L3-L4, L5-S1 lami, PSF L3-S1, TLIF L5-S1, L3-pelvis osteotomies      Review of Systems:  Review of Systems: MSK- lumbar pain  Other Review of Systems: All other review of systems negative, except as noted in HPI      Allergies and Intolerances:        Allergies:  	latex: Latex, Rash  	penicillin: Drug, Rash    Home Medications:   * Patient Currently Takes Medications as of 13-May-2024 10:47 documented in Structured Notes  · 	MS Contin 15 mg oral tablet, extended release: 1 tab(s) orally every 12 hours as needed for  severe pain MDD: 2  · 	aspirin 81 mg oral tablet, chewable: 1 tab(s) orally 2 times a day MDD: 2  · 	oxyCODONE 5 mg oral tablet: 1 tab(s) orally every 4 hours as needed for Moderate Pain (4 - 6) MDD: 6  · 	celecoxib 200 mg oral capsule: 1 cap(s) orally every 12 hours MDD: 2  · 	pantoprazole 40 mg oral delayed release tablet: 1 tab(s) orally once a day (before a meal) MDD: 1  · 	Multiple Vitamins oral tablet: 1 tab(s) orally once a day  · 	atorvastatin 40 mg oral tablet: 1 tab(s) orally once a day (at bedtime)  · 	acetaminophen 500 mg oral tablet: 2 tab(s) orally every 8 hours  · 	gabapentin 300 mg oral tablet: orally once a day (at bedtime)  · 	vitamin c: 500 milligram(s) orally once a day  · 	spironolactone 50 mg oral tablet: 1 tablet orally once a day . Continue to take your blood pressure at home and follow-up with your primary care provider.    Patient History:   Past Medical, Past Surgical, and Family History:  PAST MEDICAL HISTORY:  Asthma childhood    HLD (hyperlipidemia)     Hypertension     Obesity     Osteoarthritis of hips, bilateral     Osteoarthritis of knees, bilateral.     PAST SURGICAL HISTORY:  H/O breast surgery bilateral lift    H/O total knee replacement left    History of hip replacement, total, right 2016 right , left 2018.    Social History:  · Substance use	No    "    Vital Signs Last 24 Hrs  T(C): 36.9 (25 Apr 2025 08:38), Max: 36.9 (25 Apr 2025 08:38)  T(F): 98.4 (25 Apr 2025 08:38), Max: 98.4 (25 Apr 2025 08:38)  HR: 89 (25 Apr 2025 08:38) (84 - 96)  BP: 95/63 (25 Apr 2025 08:38) (95/63 - 113/72)  BP(mean): 75 (24 Apr 2025 20:12) (75 - 88)  RR: 18 (25 Apr 2025 08:38) (10 - 18)  SpO2: 97% (25 Apr 2025 08:38) (94% - 99%)    Parameters below as of 25 Apr 2025 08:38  Patient On (Oxygen Delivery Method): room air    Physical exam  General: nad, sitting up in bed, eating breakfast  heent: ncat, mmm  cards: rrr, normal s1s2  pulm: ctab, no wheeze, crackles, rales or rhonchi  ab: soft, ntnd, normoactive bowel sounds  ext: wwp  neuro: speech fluent, no facial asymmetry, no acute deficits noted

## 2025-04-26 LAB
ANION GAP SERPL CALC-SCNC: 11 MMOL/L — SIGNIFICANT CHANGE UP (ref 5–17)
BASOPHILS # BLD AUTO: 0.01 K/UL — SIGNIFICANT CHANGE UP (ref 0–0.2)
BASOPHILS NFR BLD AUTO: 0.1 % — SIGNIFICANT CHANGE UP (ref 0–2)
BUN SERPL-MCNC: 20 MG/DL — SIGNIFICANT CHANGE UP (ref 7–23)
CALCIUM SERPL-MCNC: 8.6 MG/DL — SIGNIFICANT CHANGE UP (ref 8.4–10.5)
CHLORIDE SERPL-SCNC: 106 MMOL/L — SIGNIFICANT CHANGE UP (ref 96–108)
CO2 SERPL-SCNC: 23 MMOL/L — SIGNIFICANT CHANGE UP (ref 22–31)
CREAT SERPL-MCNC: 0.75 MG/DL — SIGNIFICANT CHANGE UP (ref 0.5–1.3)
EGFR: 87 ML/MIN/1.73M2 — SIGNIFICANT CHANGE UP
EGFR: 87 ML/MIN/1.73M2 — SIGNIFICANT CHANGE UP
EOSINOPHIL # BLD AUTO: 0 K/UL — SIGNIFICANT CHANGE UP (ref 0–0.5)
EOSINOPHIL NFR BLD AUTO: 0 % — SIGNIFICANT CHANGE UP (ref 0–6)
GLUCOSE SERPL-MCNC: 93 MG/DL — SIGNIFICANT CHANGE UP (ref 70–99)
HCT VFR BLD CALC: 27.8 % — LOW (ref 34.5–45)
HGB BLD-MCNC: 8.9 G/DL — LOW (ref 11.5–15.5)
IMM GRANULOCYTES NFR BLD AUTO: 0.4 % — SIGNIFICANT CHANGE UP (ref 0–0.9)
LYMPHOCYTES # BLD AUTO: 1.84 K/UL — SIGNIFICANT CHANGE UP (ref 1–3.3)
LYMPHOCYTES # BLD AUTO: 13.5 % — SIGNIFICANT CHANGE UP (ref 13–44)
MCHC RBC-ENTMCNC: 30.4 PG — SIGNIFICANT CHANGE UP (ref 27–34)
MCHC RBC-ENTMCNC: 32 G/DL — SIGNIFICANT CHANGE UP (ref 32–36)
MCV RBC AUTO: 94.9 FL — SIGNIFICANT CHANGE UP (ref 80–100)
MONOCYTES # BLD AUTO: 1.21 K/UL — HIGH (ref 0–0.9)
MONOCYTES NFR BLD AUTO: 8.8 % — SIGNIFICANT CHANGE UP (ref 2–14)
NEUTROPHILS # BLD AUTO: 10.56 K/UL — HIGH (ref 1.8–7.4)
NEUTROPHILS NFR BLD AUTO: 77.2 % — HIGH (ref 43–77)
NRBC BLD AUTO-RTO: 0 /100 WBCS — SIGNIFICANT CHANGE UP (ref 0–0)
PLATELET # BLD AUTO: 155 K/UL — SIGNIFICANT CHANGE UP (ref 150–400)
POTASSIUM SERPL-MCNC: 4.4 MMOL/L — SIGNIFICANT CHANGE UP (ref 3.5–5.3)
POTASSIUM SERPL-SCNC: 4.4 MMOL/L — SIGNIFICANT CHANGE UP (ref 3.5–5.3)
RBC # BLD: 2.93 M/UL — LOW (ref 3.8–5.2)
RBC # FLD: 16.4 % — HIGH (ref 10.3–14.5)
SODIUM SERPL-SCNC: 140 MMOL/L — SIGNIFICANT CHANGE UP (ref 135–145)
WBC # BLD: 13.68 K/UL — HIGH (ref 3.8–10.5)
WBC # FLD AUTO: 13.68 K/UL — HIGH (ref 3.8–10.5)

## 2025-04-26 PROCEDURE — 99233 SBSQ HOSP IP/OBS HIGH 50: CPT

## 2025-04-26 RX ADMIN — METHOCARBAMOL 500 MILLIGRAM(S): 500 TABLET, FILM COATED ORAL at 14:12

## 2025-04-26 RX ADMIN — PREGABALIN 50 MILLIGRAM(S): 75 CAPSULE ORAL at 21:10

## 2025-04-26 RX ADMIN — SODIUM CHLORIDE 100 MILLILITER(S): 9 INJECTION, SOLUTION INTRAVENOUS at 18:04

## 2025-04-26 RX ADMIN — PREGABALIN 50 MILLIGRAM(S): 75 CAPSULE ORAL at 14:12

## 2025-04-26 RX ADMIN — METHOCARBAMOL 500 MILLIGRAM(S): 500 TABLET, FILM COATED ORAL at 21:09

## 2025-04-26 RX ADMIN — Medication 1000 MILLIGRAM(S): at 06:23

## 2025-04-26 RX ADMIN — Medication 4 MILLIGRAM(S): at 06:23

## 2025-04-26 RX ADMIN — METHOCARBAMOL 500 MILLIGRAM(S): 500 TABLET, FILM COATED ORAL at 06:23

## 2025-04-26 RX ADMIN — Medication 1000 MILLIGRAM(S): at 14:12

## 2025-04-26 RX ADMIN — Medication 1000 MILLIGRAM(S): at 21:09

## 2025-04-26 RX ADMIN — ENOXAPARIN SODIUM 40 MILLIGRAM(S): 100 INJECTION SUBCUTANEOUS at 22:07

## 2025-04-26 RX ADMIN — Medication 4 MILLIGRAM(S): at 17:00

## 2025-04-26 RX ADMIN — Medication 50 MILLIGRAM(S): at 06:23

## 2025-04-26 RX ADMIN — PREGABALIN 50 MILLIGRAM(S): 75 CAPSULE ORAL at 06:23

## 2025-04-26 RX ADMIN — Medication 4 MILLIGRAM(S): at 11:02

## 2025-04-26 RX ADMIN — SODIUM CHLORIDE 100 MILLILITER(S): 9 INJECTION, SOLUTION INTRAVENOUS at 06:24

## 2025-04-26 RX ADMIN — Medication 2 TABLET(S): at 21:09

## 2025-04-26 RX ADMIN — ATORVASTATIN CALCIUM 40 MILLIGRAM(S): 80 TABLET, FILM COATED ORAL at 21:09

## 2025-04-26 RX ADMIN — POLYETHYLENE GLYCOL 3350 17 GRAM(S): 17 POWDER, FOR SOLUTION ORAL at 11:01

## 2025-04-26 NOTE — OCCUPATIONAL THERAPY INITIAL EVALUATION ADULT - GENERAL OBSERVATIONS, REHAB EVAL
Pt received semi-supine in bed, +heplock, +tele, +hemovac x2, +back dressing C/D/I, +b/l SCDs, NAD, and agreeable to OT. PT Iggy present for evaluation. Cleared by NICOLE Rolle.

## 2025-04-26 NOTE — OCCUPATIONAL THERAPY INITIAL EVALUATION ADULT - DIAGNOSIS, OT EVAL
Pt POD2 admitted for L3/L4 and L5/S1 laminectomies, TLIF, L2-pelvis posterior spinal fusion with instrumentation. Pt presents with impaired balance, strength deficits, and decreased activity tolerance impacting her independence in ADLs and functional mobility. breath sounds bilateral/breath sounds equal/positive end tidal CO2 noted/CXR pending/chest excursion noted

## 2025-04-26 NOTE — OCCUPATIONAL THERAPY INITIAL EVALUATION ADULT - ADDITIONAL COMMENTS
Pt resides alone in an elevator building, with ramp entrance. Pt reports being independent in ADLs, IADLs, and mobility tasks. Pt has cane, grab bar in shower, and commode.

## 2025-04-26 NOTE — OCCUPATIONAL THERAPY INITIAL EVALUATION ADULT - MODIFIED CLINICAL TEST OF SENSORY INTEGRATION IN BALANCE TEST
Pt able to ambulate 75 feet x2 with supervision using RW, demonstrating fairly steady gait, difficulty weight-shifting, and decreased step length.

## 2025-04-26 NOTE — PROGRESS NOTE ADULT - SUBJECTIVE AND OBJECTIVE BOX
SUBJECTIVE: Pt seen and examined on morning rounds. Bladder scan >400 this am, straight cath ordered. Pt denies cp/sob/n/v/ha    Vital Signs Last 24 Hrs  T(C): 36.4 (26 Apr 2025 08:10), Max: 37.1 (26 Apr 2025 06:26)  T(F): 97.6 (26 Apr 2025 08:10), Max: 98.8 (26 Apr 2025 06:26)  HR: 82 (26 Apr 2025 08:10) (73 - 94)  BP: 109/71 (26 Apr 2025 08:10) (100/66 - 126/65)  BP(mean): --  RR: 18 (26 Apr 2025 08:10) (15 - 18)  SpO2: 95% (26 Apr 2025 08:10) (95% - 100%)    Parameters below as of 26 Apr 2025 08:10  Patient On (Oxygen Delivery Method): room air      Physical Exam:  General: NAD, resting comfortably in bed  Drains: HVx2 holding suction  Pulses: 2+ DP  Sensation: SILT grossly L2-S1 bilaterally  Motor: 5/5 L2-S1 bilaterally    A/P: 69yFemale s/p L2-Pelvis PSF by Dr. ELVA Velazco on 04-24  - Stable  - Pain Control  - DVT ppx: SCDs, LVX   - Post op abx: Ancef completed  - Drains: HVx2, continue  - WBS: WBAT  - PT rec HPT  - TOV failed overnight, repeat today

## 2025-04-26 NOTE — PROGRESS NOTE ADULT - SUBJECTIVE AND OBJECTIVE BOX
OVERNIGHT EVENTS: NAEO    SUBJECTIVE / INTERVAL HPI: Patient seen and examined at bedside. Patient denying chest pain, SOB, palpitations, cough. Patient denies fever, chills, HA, Dizziness, N/V. Has not had BM yet. C/o LUE subjective weakness.     Remaining ROS negative       PHYSICAL EXAM:    General:NAD.   HEENT: NC/AT; PERRL, anicteric sclera; MMM  Neck: supple  Cardiovascular: +S1/S2, RRR  Respiratory: CTA B/L; no W/R/R  Gastrointestinal: soft, NT/ND; +BSx4  Extremities: WWP; no edema, clubbing or cyanosis  Vascular: 2+ radial, DP/PT pulses B/L  Neurological: AAOx3; no focal deficits. 5/5 x4 extremities    Psychiatric: pleasant mood and affect  Dermatologic: no appreciable wounds or damage to the skin    VITAL SIGNS:  Vital Signs Last 24 Hrs  T(C): 36.8 (26 Apr 2025 12:30), Max: 37.1 (26 Apr 2025 06:26)  T(F): 98.2 (26 Apr 2025 12:30), Max: 98.8 (26 Apr 2025 06:26)  HR: 94 (26 Apr 2025 12:30) (73 - 100)  BP: 96/62 (26 Apr 2025 12:30) (96/62 - 126/65)  BP(mean): --  RR: 18 (26 Apr 2025 12:30) (15 - 18)  SpO2: 94% (26 Apr 2025 12:30) (94% - 100%)    Parameters below as of 26 Apr 2025 12:30  Patient On (Oxygen Delivery Method): room air          MEDICATIONS:  MEDICATIONS  (STANDING):  acetaminophen     Tablet .. 1000 milliGRAM(s) Oral every 8 hours  atorvastatin 40 milliGRAM(s) Oral at bedtime  enoxaparin Injectable 40 milliGRAM(s) SubCutaneous every 24 hours  lactated ringers. 1000 milliLiter(s) (100 mL/Hr) IV Continuous <Continuous>  methocarbamol 500 milliGRAM(s) Oral every 8 hours  ondansetron   Disintegrating Tablet 4 milliGRAM(s) Oral every 6 hours  polyethylene glycol 3350 17 Gram(s) Oral daily  pregabalin 50 milliGRAM(s) Oral three times a day  senna 2 Tablet(s) Oral at bedtime  spironolactone 50 milliGRAM(s) Oral daily    MEDICATIONS  (PRN):  benzocaine/menthol Lozenge 1 Lozenge Oral every 3 hours PRN Sore Throat  famotidine    Tablet 20 milliGRAM(s) Oral every 12 hours PRN Dyspepsia  HYDROmorphone  Injectable 0.5 milliGRAM(s) IV Push every 3 hours PRN Breakthrough pain  oxyCODONE    IR 5 milliGRAM(s) Oral every 6 hours PRN Moderate Pain (4 - 6)  oxyCODONE    IR 10 milliGRAM(s) Oral every 4 hours PRN Severe Pain (7 - 10)      ALLERGIES:  Allergies    latex (Rash)  penicillin (Rash)    Intolerances        LABS:                        8.9    13.68 )-----------( 155      ( 26 Apr 2025 05:30 )             27.8     04-26    140  |  106  |  20  ----------------------------<  93  4.4   |  23  |  0.75    Ca    8.6      26 Apr 2025 05:30        Urinalysis Basic - ( 26 Apr 2025 05:30 )    Color: x / Appearance: x / SG: x / pH: x  Gluc: 93 mg/dL / Ketone: x  / Bili: x / Urobili: x   Blood: x / Protein: x / Nitrite: x   Leuk Esterase: x / RBC: x / WBC x   Sq Epi: x / Non Sq Epi: x / Bacteria: x      CAPILLARY BLOOD GLUCOSE          RADIOLOGY & ADDITIONAL TESTS: Reviewed.

## 2025-04-26 NOTE — OCCUPATIONAL THERAPY INITIAL EVALUATION ADULT - PERTINENT HX OF CURRENT PROBLEM, REHAB EVAL
68yo f c/o back pain that is acute on chronic. Pt reports she had chronic low back pain that was treated with a spinal fusion with Dr Velazco 5 years ago. At that time she recovered well.  Overtime her knees became painful so she underwent a left TKA 2 years ago, and a right TKA 1 year ago with Dr. Mitchell.  In the last year, her low back pain has been progressing once again. It does not radiate down her legs. Noticing some tingling to her left toes intermittently lately. Denies weakness. Ambulates with a cane at baseline. States she did not need prescription strength opioids after 2 weeks postop from her knee replacements  and now takes tylenol and celebrex for pain.     Presents today for elective L3-L4, L5-S1 lami, PSF L3-S1, TLIF L5-S1, L3-pelvis osteotomies

## 2025-04-27 LAB
ANION GAP SERPL CALC-SCNC: 6 MMOL/L — SIGNIFICANT CHANGE UP (ref 5–17)
BASOPHILS # BLD AUTO: 0.01 K/UL — SIGNIFICANT CHANGE UP (ref 0–0.2)
BASOPHILS NFR BLD AUTO: 0.1 % — SIGNIFICANT CHANGE UP (ref 0–2)
BUN SERPL-MCNC: 17 MG/DL — SIGNIFICANT CHANGE UP (ref 7–23)
CALCIUM SERPL-MCNC: 8 MG/DL — LOW (ref 8.4–10.5)
CHLORIDE SERPL-SCNC: 111 MMOL/L — HIGH (ref 96–108)
CO2 SERPL-SCNC: 26 MMOL/L — SIGNIFICANT CHANGE UP (ref 22–31)
CREAT SERPL-MCNC: 0.74 MG/DL — SIGNIFICANT CHANGE UP (ref 0.5–1.3)
EGFR: 88 ML/MIN/1.73M2 — SIGNIFICANT CHANGE UP
EGFR: 88 ML/MIN/1.73M2 — SIGNIFICANT CHANGE UP
EOSINOPHIL # BLD AUTO: 0.19 K/UL — SIGNIFICANT CHANGE UP (ref 0–0.5)
EOSINOPHIL NFR BLD AUTO: 1.7 % — SIGNIFICANT CHANGE UP (ref 0–6)
GLUCOSE SERPL-MCNC: 93 MG/DL — SIGNIFICANT CHANGE UP (ref 70–99)
HCT VFR BLD CALC: 23.8 % — LOW (ref 34.5–45)
HGB BLD-MCNC: 7.6 G/DL — LOW (ref 11.5–15.5)
IMM GRANULOCYTES NFR BLD AUTO: 0.6 % — SIGNIFICANT CHANGE UP (ref 0–0.9)
LYMPHOCYTES # BLD AUTO: 28.2 % — SIGNIFICANT CHANGE UP (ref 13–44)
LYMPHOCYTES # BLD AUTO: 3.19 K/UL — SIGNIFICANT CHANGE UP (ref 1–3.3)
MCHC RBC-ENTMCNC: 31.5 PG — SIGNIFICANT CHANGE UP (ref 27–34)
MCHC RBC-ENTMCNC: 31.9 G/DL — LOW (ref 32–36)
MCV RBC AUTO: 98.8 FL — SIGNIFICANT CHANGE UP (ref 80–100)
MONOCYTES # BLD AUTO: 0.92 K/UL — HIGH (ref 0–0.9)
MONOCYTES NFR BLD AUTO: 8.1 % — SIGNIFICANT CHANGE UP (ref 2–14)
NEUTROPHILS # BLD AUTO: 6.93 K/UL — SIGNIFICANT CHANGE UP (ref 1.8–7.4)
NEUTROPHILS NFR BLD AUTO: 61.3 % — SIGNIFICANT CHANGE UP (ref 43–77)
NRBC BLD AUTO-RTO: 0 /100 WBCS — SIGNIFICANT CHANGE UP (ref 0–0)
PLATELET # BLD AUTO: 151 K/UL — SIGNIFICANT CHANGE UP (ref 150–400)
POTASSIUM SERPL-MCNC: 4.4 MMOL/L — SIGNIFICANT CHANGE UP (ref 3.5–5.3)
POTASSIUM SERPL-SCNC: 4.4 MMOL/L — SIGNIFICANT CHANGE UP (ref 3.5–5.3)
RBC # BLD: 2.41 M/UL — LOW (ref 3.8–5.2)
RBC # FLD: 16.4 % — HIGH (ref 10.3–14.5)
SODIUM SERPL-SCNC: 143 MMOL/L — SIGNIFICANT CHANGE UP (ref 135–145)
WBC # BLD: 11.31 K/UL — HIGH (ref 3.8–10.5)
WBC # FLD AUTO: 11.31 K/UL — HIGH (ref 3.8–10.5)

## 2025-04-27 PROCEDURE — 99233 SBSQ HOSP IP/OBS HIGH 50: CPT

## 2025-04-27 RX ADMIN — Medication 1000 MILLIGRAM(S): at 22:35

## 2025-04-27 RX ADMIN — METHOCARBAMOL 500 MILLIGRAM(S): 500 TABLET, FILM COATED ORAL at 21:41

## 2025-04-27 RX ADMIN — Medication 1000 MILLIGRAM(S): at 06:01

## 2025-04-27 RX ADMIN — PREGABALIN 50 MILLIGRAM(S): 75 CAPSULE ORAL at 06:01

## 2025-04-27 RX ADMIN — Medication 4 MILLIGRAM(S): at 13:27

## 2025-04-27 RX ADMIN — METHOCARBAMOL 500 MILLIGRAM(S): 500 TABLET, FILM COATED ORAL at 13:26

## 2025-04-27 RX ADMIN — ENOXAPARIN SODIUM 40 MILLIGRAM(S): 100 INJECTION SUBCUTANEOUS at 21:40

## 2025-04-27 RX ADMIN — PREGABALIN 50 MILLIGRAM(S): 75 CAPSULE ORAL at 21:40

## 2025-04-27 RX ADMIN — Medication 50 MILLIGRAM(S): at 06:02

## 2025-04-27 RX ADMIN — Medication 1000 MILLIGRAM(S): at 13:28

## 2025-04-27 RX ADMIN — METHOCARBAMOL 500 MILLIGRAM(S): 500 TABLET, FILM COATED ORAL at 06:01

## 2025-04-27 RX ADMIN — Medication 2 TABLET(S): at 21:40

## 2025-04-27 RX ADMIN — OXYCODONE HYDROCHLORIDE 5 MILLIGRAM(S): 30 TABLET ORAL at 03:20

## 2025-04-27 RX ADMIN — POLYETHYLENE GLYCOL 3350 17 GRAM(S): 17 POWDER, FOR SOLUTION ORAL at 13:29

## 2025-04-27 RX ADMIN — PREGABALIN 50 MILLIGRAM(S): 75 CAPSULE ORAL at 13:26

## 2025-04-27 RX ADMIN — Medication 1000 MILLIGRAM(S): at 21:40

## 2025-04-27 RX ADMIN — ATORVASTATIN CALCIUM 40 MILLIGRAM(S): 80 TABLET, FILM COATED ORAL at 21:41

## 2025-04-27 RX ADMIN — OXYCODONE HYDROCHLORIDE 5 MILLIGRAM(S): 30 TABLET ORAL at 04:20

## 2025-04-27 RX ADMIN — SODIUM CHLORIDE 100 MILLILITER(S): 9 INJECTION, SOLUTION INTRAVENOUS at 03:50

## 2025-04-27 NOTE — PROGRESS NOTE ADULT - SUBJECTIVE AND OBJECTIVE BOX
OVERNIGHT EVENTS: NAEO    SUBJECTIVE / INTERVAL HPI: Patient seen and examined at bedside. Patient denying chest pain, SOB, palpitations, cough. Patient denies fever, chills, HA, Dizziness, N/V, abdominal pain. Feeling okay today. States was a little lightheaded after PT. Has not had BM yet.     Remaining ROS negative       PHYSICAL EXAM:    General:NAD.   HEENT: NC/AT; PERRL, anicteric sclera; MMM  Neck: supple  Cardiovascular: +S1/S2, RRR  Respiratory: CTA B/L; no W/R/R  Gastrointestinal: soft, NT/ND; +BSx4  Extremities: WWP; no edema, clubbing or cyanosis   Neurological: AAOx3; no focal deficits  Psychiatric: pleasant mood and affect      VITAL SIGNS:  Vital Signs Last 24 Hrs  T(C): 36.8 (27 Apr 2025 13:00), Max: 37.1 (27 Apr 2025 09:00)  T(F): 98.2 (27 Apr 2025 13:00), Max: 98.8 (27 Apr 2025 09:00)  HR: 91 (27 Apr 2025 13:00) (84 - 101)  BP: 105/71 (27 Apr 2025 13:00) (92/51 - 118/59)  BP(mean): 83 (27 Apr 2025 13:00) (83 - 83)  RR: 18 (27 Apr 2025 13:00) (16 - 18)  SpO2: 98% (27 Apr 2025 13:00) (94% - 99%)    Parameters below as of 27 Apr 2025 13:00  Patient On (Oxygen Delivery Method): room air          MEDICATIONS:  MEDICATIONS  (STANDING):  acetaminophen     Tablet .. 1000 milliGRAM(s) Oral every 8 hours  atorvastatin 40 milliGRAM(s) Oral at bedtime  enoxaparin Injectable 40 milliGRAM(s) SubCutaneous every 24 hours  lactated ringers. 1000 milliLiter(s) (100 mL/Hr) IV Continuous <Continuous>  methocarbamol 500 milliGRAM(s) Oral every 8 hours  ondansetron   Disintegrating Tablet 4 milliGRAM(s) Oral every 6 hours  polyethylene glycol 3350 17 Gram(s) Oral daily  pregabalin 50 milliGRAM(s) Oral three times a day  senna 2 Tablet(s) Oral at bedtime  spironolactone 50 milliGRAM(s) Oral daily    MEDICATIONS  (PRN):  benzocaine/menthol Lozenge 1 Lozenge Oral every 3 hours PRN Sore Throat  famotidine    Tablet 20 milliGRAM(s) Oral every 12 hours PRN Dyspepsia  HYDROmorphone  Injectable 0.5 milliGRAM(s) IV Push every 3 hours PRN Breakthrough pain  oxyCODONE    IR 5 milliGRAM(s) Oral every 6 hours PRN Moderate Pain (4 - 6)  oxyCODONE    IR 10 milliGRAM(s) Oral every 4 hours PRN Severe Pain (7 - 10)      ALLERGIES:  Allergies    latex (Rash)  penicillin (Rash)    Intolerances        LABS:                        7.6    11.31 )-----------( 151      ( 27 Apr 2025 05:30 )             23.8     04-27    143  |  111[H]  |  17  ----------------------------<  93  4.4   |  26  |  0.74    Ca    8.0[L]      27 Apr 2025 05:30        Urinalysis Basic - ( 27 Apr 2025 05:30 )    Color: x / Appearance: x / SG: x / pH: x  Gluc: 93 mg/dL / Ketone: x  / Bili: x / Urobili: x   Blood: x / Protein: x / Nitrite: x   Leuk Esterase: x / RBC: x / WBC x   Sq Epi: x / Non Sq Epi: x / Bacteria: x      CAPILLARY BLOOD GLUCOSE          RADIOLOGY & ADDITIONAL TESTS: Reviewed.

## 2025-04-27 NOTE — PROGRESS NOTE ADULT - TIME BILLING
Review of hospital course, labs, vitals, medical records.   Bedside exam and interview   Discussed plan of care with primary team ACP and housestaff   Documenting the encounter  Excludes teaching time and/or separately reported services
Review of hospital course, labs, vitals, medical records.   Bedside exam and interview   Discussed plan of care with primary team ACP and housestaff   Documenting the encounter  Excludes teaching time and/or separately reported services
WDL

## 2025-04-27 NOTE — PROGRESS NOTE ADULT - SUBJECTIVE AND OBJECTIVE BOX
SUBJECTIVE: Pt seen and examined on morning rounds. Pt reports good pain control. Pt denies cp/sob/n/v/ha    Vital Signs Last 24 Hrs  T(C): 36.6 (27 Apr 2025 05:55), Max: 36.9 (26 Apr 2025 20:30)  T(F): 97.8 (27 Apr 2025 05:55), Max: 98.5 (26 Apr 2025 20:30)  HR: 88 (27 Apr 2025 05:55) (84 - 101)  BP: 105/68 (27 Apr 2025 05:55) (92/51 - 121/67)  BP(mean): --  RR: 18 (27 Apr 2025 05:55) (16 - 18)  SpO2: 99% (27 Apr 2025 05:55) (94% - 99%)    Parameters below as of 27 Apr 2025 05:55  Patient On (Oxygen Delivery Method): nasal cannula  O2 Flow (L/min): 2      Physical Exam:  General: NAD, resting comfortably in bed  Drains: HVx2 holding suction  Pulses: 2+ DP  Sensation: SILT grossly L2-S1 bilaterally  Motor: 5/5 L2-S1 bilaterally    A/P: 69yFemale s/p L2-Pelvis PSF by Dr. ELVA Velazco on 04-24  - Stable  - Pain Control  - DVT ppx: SCDs, LVX   - Post op abx: Ancef completed  - Drains: HVx2, continue  - WBS: WBAT  - PT rec HPT  - passed TOV

## 2025-04-28 LAB
ANION GAP SERPL CALC-SCNC: 9 MMOL/L — SIGNIFICANT CHANGE UP (ref 5–17)
APPEARANCE UR: CLEAR — SIGNIFICANT CHANGE UP
BILIRUB UR-MCNC: NEGATIVE — SIGNIFICANT CHANGE UP
BLD GP AB SCN SERPL QL: NEGATIVE — SIGNIFICANT CHANGE UP
BUN SERPL-MCNC: 11 MG/DL — SIGNIFICANT CHANGE UP (ref 7–23)
CALCIUM SERPL-MCNC: 8.7 MG/DL — SIGNIFICANT CHANGE UP (ref 8.4–10.5)
CHLORIDE SERPL-SCNC: 101 MMOL/L — SIGNIFICANT CHANGE UP (ref 96–108)
CO2 SERPL-SCNC: 26 MMOL/L — SIGNIFICANT CHANGE UP (ref 22–31)
COLOR SPEC: YELLOW — SIGNIFICANT CHANGE UP
CREAT SERPL-MCNC: 0.73 MG/DL — SIGNIFICANT CHANGE UP (ref 0.5–1.3)
DIFF PNL FLD: NEGATIVE — SIGNIFICANT CHANGE UP
EGFR: 90 ML/MIN/1.73M2 — SIGNIFICANT CHANGE UP
EGFR: 90 ML/MIN/1.73M2 — SIGNIFICANT CHANGE UP
GLUCOSE SERPL-MCNC: 95 MG/DL — SIGNIFICANT CHANGE UP (ref 70–99)
GLUCOSE UR QL: NEGATIVE MG/DL — SIGNIFICANT CHANGE UP
HCT VFR BLD CALC: 24 % — LOW (ref 34.5–45)
HGB BLD-MCNC: 7.5 G/DL — LOW (ref 11.5–15.5)
KETONES UR-MCNC: NEGATIVE MG/DL — SIGNIFICANT CHANGE UP
LEUKOCYTE ESTERASE UR-ACNC: NEGATIVE — SIGNIFICANT CHANGE UP
MCHC RBC-ENTMCNC: 31.1 PG — SIGNIFICANT CHANGE UP (ref 27–34)
MCHC RBC-ENTMCNC: 31.3 G/DL — LOW (ref 32–36)
MCV RBC AUTO: 99.6 FL — SIGNIFICANT CHANGE UP (ref 80–100)
NITRITE UR-MCNC: NEGATIVE — SIGNIFICANT CHANGE UP
NRBC BLD AUTO-RTO: 0 /100 WBCS — SIGNIFICANT CHANGE UP (ref 0–0)
PH UR: 6 — SIGNIFICANT CHANGE UP (ref 5–8)
PLATELET # BLD AUTO: 154 K/UL — SIGNIFICANT CHANGE UP (ref 150–400)
POTASSIUM SERPL-MCNC: 4.3 MMOL/L — SIGNIFICANT CHANGE UP (ref 3.5–5.3)
POTASSIUM SERPL-SCNC: 4.3 MMOL/L — SIGNIFICANT CHANGE UP (ref 3.5–5.3)
PROT UR-MCNC: NEGATIVE MG/DL — SIGNIFICANT CHANGE UP
RAPID RVP RESULT: SIGNIFICANT CHANGE UP
RBC # BLD: 2.41 M/UL — LOW (ref 3.8–5.2)
RBC # FLD: 16.5 % — HIGH (ref 10.3–14.5)
RH IG SCN BLD-IMP: POSITIVE — SIGNIFICANT CHANGE UP
SARS-COV-2 RNA SPEC QL NAA+PROBE: SIGNIFICANT CHANGE UP
SODIUM SERPL-SCNC: 136 MMOL/L — SIGNIFICANT CHANGE UP (ref 135–145)
SP GR SPEC: 1.01 — SIGNIFICANT CHANGE UP (ref 1–1.03)
UROBILINOGEN FLD QL: 0.2 MG/DL — SIGNIFICANT CHANGE UP (ref 0.2–1)
WBC # BLD: 11.14 K/UL — HIGH (ref 3.8–10.5)
WBC # FLD AUTO: 11.14 K/UL — HIGH (ref 3.8–10.5)

## 2025-04-28 PROCEDURE — 99232 SBSQ HOSP IP/OBS MODERATE 35: CPT

## 2025-04-28 PROCEDURE — 72100 X-RAY EXAM L-S SPINE 2/3 VWS: CPT | Mod: 26

## 2025-04-28 PROCEDURE — 71045 X-RAY EXAM CHEST 1 VIEW: CPT | Mod: 26

## 2025-04-28 RX ORDER — BISACODYL 5 MG
5 TABLET, DELAYED RELEASE (ENTERIC COATED) ORAL EVERY 12 HOURS
Refills: 0 | Status: DISCONTINUED | OUTPATIENT
Start: 2025-04-28 | End: 2025-05-01

## 2025-04-28 RX ADMIN — Medication 1000 MILLIGRAM(S): at 13:03

## 2025-04-28 RX ADMIN — Medication 1000 MILLIGRAM(S): at 06:31

## 2025-04-28 RX ADMIN — PREGABALIN 50 MILLIGRAM(S): 75 CAPSULE ORAL at 06:32

## 2025-04-28 RX ADMIN — Medication 1000 MILLIGRAM(S): at 23:45

## 2025-04-28 RX ADMIN — Medication 4 MILLIGRAM(S): at 11:18

## 2025-04-28 RX ADMIN — ATORVASTATIN CALCIUM 40 MILLIGRAM(S): 80 TABLET, FILM COATED ORAL at 22:45

## 2025-04-28 RX ADMIN — ENOXAPARIN SODIUM 40 MILLIGRAM(S): 100 INJECTION SUBCUTANEOUS at 22:45

## 2025-04-28 RX ADMIN — OXYCODONE HYDROCHLORIDE 5 MILLIGRAM(S): 30 TABLET ORAL at 23:45

## 2025-04-28 RX ADMIN — PREGABALIN 50 MILLIGRAM(S): 75 CAPSULE ORAL at 22:45

## 2025-04-28 RX ADMIN — OXYCODONE HYDROCHLORIDE 5 MILLIGRAM(S): 30 TABLET ORAL at 22:45

## 2025-04-28 RX ADMIN — METHOCARBAMOL 500 MILLIGRAM(S): 500 TABLET, FILM COATED ORAL at 06:32

## 2025-04-28 RX ADMIN — Medication 1000 MILLIGRAM(S): at 22:45

## 2025-04-28 RX ADMIN — Medication 50 MILLIGRAM(S): at 06:31

## 2025-04-28 RX ADMIN — Medication 2 TABLET(S): at 22:45

## 2025-04-28 RX ADMIN — SODIUM CHLORIDE 100 MILLILITER(S): 9 INJECTION, SOLUTION INTRAVENOUS at 01:45

## 2025-04-28 RX ADMIN — POLYETHYLENE GLYCOL 3350 17 GRAM(S): 17 POWDER, FOR SOLUTION ORAL at 11:19

## 2025-04-28 RX ADMIN — METHOCARBAMOL 500 MILLIGRAM(S): 500 TABLET, FILM COATED ORAL at 17:01

## 2025-04-28 NOTE — PROGRESS NOTE ADULT - SUBJECTIVE AND OBJECTIVE BOX
Ortho Progress Note    Procedure: L2-Pelvis PSF on 4/24  Surgeon: Dr. Velazco    SUBJECTIVE: Pt seen and examined on morning rounds. Pt reports good pain control. Pt denies cp/sob/n/v/ha    Vital Signs Last 24 Hrs  T(C): 37.7 (28 Apr 2025 05:18), Max: 38.1 (28 Apr 2025 00:31)  T(F): 99.8 (28 Apr 2025 05:18), Max: 100.6 (28 Apr 2025 00:31)  HR: 97 (28 Apr 2025 05:18) (84 - 108)  BP: 109/66 (28 Apr 2025 05:18) (103/59 - 143/73)  BP(mean): 83 (27 Apr 2025 13:00) (83 - 83)  RR: 18 (28 Apr 2025 05:18) (17 - 18)  SpO2: 94% (28 Apr 2025 05:18) (93% - 98%)    Parameters below as of 28 Apr 2025 05:18  Patient On (Oxygen Delivery Method): room air    Physical Exam:  General: NAD, resting comfortably in bed  Drains: HVx1 holding suction  Pulses: 2+ DP  Sensation: SILT grossly L2-S1 bilaterally  Motor: 5/5 L2-S1 bilaterally    A/P: 69yFemale s/p L2-Pelvis PSF by Dr. ELVA Velazco on 04-24  - Stable; F/u AM CBC  - Pain Control  - DVT ppx: SCDs, LVX   - Post op abx: Ancef completed  - Drains: HVx1, continue  - WBS: WBAT  - PT rec HPT, likely to clear today  - passed TOV  - DC home pending PT clearance and drain  - Standing XR today

## 2025-04-28 NOTE — PROGRESS NOTE ADULT - SUBJECTIVE AND OBJECTIVE BOX
No chest pain or shortness of breath.  Worked with physical therapy.  No bowel movement yet, but passing flatus.     Remaining ROS negative     PHYSICAL EXAM:    General: nad, sitting up in bed  HEENT: NC/AT; MMM  Cardiovascular: +S1/S2, RRR  Respiratory: CTA B/L; no W/R/R  Gastrointestinal: soft, NT/ND; +BSx4  Extremities: WWP  Neurological: speech fluent, no facial asymmetry  Psychiatric: pleasant mood and affect    VITAL SIGNS:  Vital Signs Last 24 Hrs  T(C): 37.2 (28 Apr 2025 08:22), Max: 38.1 (28 Apr 2025 00:31)  T(F): 99 (28 Apr 2025 08:22), Max: 100.6 (28 Apr 2025 00:31)  HR: 93 (28 Apr 2025 08:22) (91 - 108)  BP: 121/58 (28 Apr 2025 08:22) (103/59 - 121/58)  BP(mean): 83 (28 Apr 2025 08:22) (83 - 83)  RR: 16 (28 Apr 2025 08:22) (16 - 18)  SpO2: 95% (28 Apr 2025 08:22) (93% - 98%)    Parameters below as of 28 Apr 2025 08:22  Patient On (Oxygen Delivery Method): room air          MEDICATIONS:  MEDICATIONS  (STANDING):  acetaminophen     Tablet .. 1000 milliGRAM(s) Oral every 8 hours  atorvastatin 40 milliGRAM(s) Oral at bedtime  enoxaparin Injectable 40 milliGRAM(s) SubCutaneous every 24 hours  lactated ringers. 1000 milliLiter(s) (100 mL/Hr) IV Continuous <Continuous>  methocarbamol 500 milliGRAM(s) Oral every 8 hours  ondansetron   Disintegrating Tablet 4 milliGRAM(s) Oral every 6 hours  polyethylene glycol 3350 17 Gram(s) Oral daily  pregabalin 50 milliGRAM(s) Oral three times a day  senna 2 Tablet(s) Oral at bedtime  spironolactone 50 milliGRAM(s) Oral daily    MEDICATIONS  (PRN):  benzocaine/menthol Lozenge 1 Lozenge Oral every 3 hours PRN Sore Throat  famotidine    Tablet 20 milliGRAM(s) Oral every 12 hours PRN Dyspepsia  HYDROmorphone  Injectable 0.5 milliGRAM(s) IV Push every 3 hours PRN Breakthrough pain  oxyCODONE    IR 5 milliGRAM(s) Oral every 6 hours PRN Moderate Pain (4 - 6)  oxyCODONE    IR 10 milliGRAM(s) Oral every 4 hours PRN Severe Pain (7 - 10)      ALLERGIES:  Allergies    latex (Rash)  penicillin (Rash)    Intolerances        LABS:                        7.5    11.14 )-----------( 154      ( 28 Apr 2025 05:30 )             24.0     04-28    136  |  101  |  11  ----------------------------<  95  4.3   |  26  |  0.73    Ca    8.7      28 Apr 2025 05:30        Urinalysis Basic - ( 28 Apr 2025 05:30 )    Color: x / Appearance: x / SG: x / pH: x  Gluc: 95 mg/dL / Ketone: x  / Bili: x / Urobili: x   Blood: x / Protein: x / Nitrite: x   Leuk Esterase: x / RBC: x / WBC x   Sq Epi: x / Non Sq Epi: x / Bacteria: x      CAPILLARY BLOOD GLUCOSE          RADIOLOGY & ADDITIONAL TESTS: Reviewed.

## 2025-04-28 NOTE — PROGRESS NOTE ADULT - SUBJECTIVE AND OBJECTIVE BOX
Ortho Note    Subjective:  Pt comfortable without complaints, pain controlled with current pain medication regimen   Denies CP, SOB, N/V, numbness/tingling   Reviewed plan of care with patient at bedside    Vital Signs Last 24 Hrs  T(C): 37.2 (04-28-25 @ 08:22), Max: 37.2 (04-28-25 @ 08:22)  T(F): 99 (04-28-25 @ 08:22), Max: 99 (04-28-25 @ 08:22)  HR: 93 (04-28-25 @ 08:22) (93 - 93)  BP: 121/58 (04-28-25 @ 08:22) (121/58 - 121/58)  BP(mean): 83 (04-28-25 @ 08:22) (83 - 83)  RR: 16 (04-28-25 @ 08:22) (16 - 16)  SpO2: 95% (04-28-25 @ 08:22) (95% - 95%)  AVSS    Objective:    Physical Exam:  General: Pt Alert and oriented, NAD  DSG C/D/I, HVx1  Pulses intact bilateral LE  Sensation: SILT L2-S1  Motor: EHL/FHL/TA/GS 5/5          Plan of Care:  A/P: 69yFemale POD#1 s/p L3/L4 and L5/S1 laminectomies, TLIF, L2-pelvis psf   - temp 100.6 overnight wbcs 11.14  - Pain Control- tylenol 1000mg PO Q8h, Dilaudid 0.5mg Q3h prn breakthrough pain, methocarbamol 500mg PO Q8h, Oxycodone 5-10mg Po Q4h prn moderate to severe pain, lyrica 50mg Po TID   - DVT ppx: Lovenox 40mg Subq Q24h  - PT, WBS: WBAT  - appreciate medicine recs  - bowel regimen, IS use, PPI   - Left continue hx1, dcd RV Hx1   - standing xray completed 4-  - Dispo- home pending pt clearance, dc of drains standing xray     Ortho Pager [phone] Ortho Note    Subjective:  Pt comfortable without complaints, pain controlled with current pain medication regimen   Denies CP, SOB, N/V, numbness/tingling   Reviewed plan of care with patient at bedside    Vital Signs Last 24 Hrs  T(C): 37.2 (04-28-25 @ 08:22), Max: 37.2 (04-28-25 @ 08:22)  T(F): 99 (04-28-25 @ 08:22), Max: 99 (04-28-25 @ 08:22)  HR: 93 (04-28-25 @ 08:22) (93 - 93)  BP: 121/58 (04-28-25 @ 08:22) (121/58 - 121/58)  BP(mean): 83 (04-28-25 @ 08:22) (83 - 83)  RR: 16 (04-28-25 @ 08:22) (16 - 16)  SpO2: 95% (04-28-25 @ 08:22) (95% - 95%)  AVSS    Objective:    Physical Exam:  General: Pt Alert and oriented, NAD  DSG C/D/I, HVx1  Pulses intact bilateral LE  Sensation: SILT L2-S1  Motor: EHL/FHL/TA/GS 5/5          Plan of Care:  A/P: 69yFemale POD#1 s/p L3/L4 and L5/S1 laminectomies, TLIF, L2-pelvis psf   - temp 100.6 overnight wbcs 11.14- chest xray ordered- UA- negative   - Pain Control- tylenol 1000mg PO Q8h, Dilaudid 0.5mg Q3h prn breakthrough pain, methocarbamol 500mg PO Q8h, Oxycodone 5-10mg Po Q4h prn moderate to severe pain, lyrica 50mg Po TID   - DVT ppx: Lovenox 40mg Subq Q24h  - PT, WBS: WBAT  - appreciate medicine recs  - bowel regimen, IS use, PPI   - Left continue hx1, dcd RV Hx1   - standing xray completed 4-  - hgb 7.45/24.0- x1 unit prbcs transfusion - type and screen ordered  - Dispo- home pending pt clearance, dc of drains, standing xray     Ortho Pager [phone]

## 2025-04-29 ENCOUNTER — TRANSCRIPTION ENCOUNTER (OUTPATIENT)
Age: 70
End: 2025-04-29

## 2025-04-29 LAB
ANION GAP SERPL CALC-SCNC: 10 MMOL/L — SIGNIFICANT CHANGE UP (ref 5–17)
BUN SERPL-MCNC: 11 MG/DL — SIGNIFICANT CHANGE UP (ref 7–23)
CALCIUM SERPL-MCNC: 8.3 MG/DL — LOW (ref 8.4–10.5)
CHLORIDE SERPL-SCNC: 104 MMOL/L — SIGNIFICANT CHANGE UP (ref 96–108)
CO2 SERPL-SCNC: 29 MMOL/L — SIGNIFICANT CHANGE UP (ref 22–31)
CREAT SERPL-MCNC: 0.72 MG/DL — SIGNIFICANT CHANGE UP (ref 0.5–1.3)
EGFR: 91 ML/MIN/1.73M2 — SIGNIFICANT CHANGE UP
EGFR: 91 ML/MIN/1.73M2 — SIGNIFICANT CHANGE UP
GLUCOSE SERPL-MCNC: 102 MG/DL — HIGH (ref 70–99)
HCT VFR BLD CALC: 27.7 % — LOW (ref 34.5–45)
HGB BLD-MCNC: 8.8 G/DL — LOW (ref 11.5–15.5)
MCHC RBC-ENTMCNC: 30 PG — SIGNIFICANT CHANGE UP (ref 27–34)
MCHC RBC-ENTMCNC: 31.8 G/DL — LOW (ref 32–36)
MCV RBC AUTO: 94.5 FL — SIGNIFICANT CHANGE UP (ref 80–100)
NRBC BLD AUTO-RTO: 0 /100 WBCS — SIGNIFICANT CHANGE UP (ref 0–0)
PLATELET # BLD AUTO: 169 K/UL — SIGNIFICANT CHANGE UP (ref 150–400)
POTASSIUM SERPL-MCNC: 4.2 MMOL/L — SIGNIFICANT CHANGE UP (ref 3.5–5.3)
POTASSIUM SERPL-SCNC: 4.2 MMOL/L — SIGNIFICANT CHANGE UP (ref 3.5–5.3)
RBC # BLD: 2.93 M/UL — LOW (ref 3.8–5.2)
RBC # FLD: 17.9 % — HIGH (ref 10.3–14.5)
SODIUM SERPL-SCNC: 143 MMOL/L — SIGNIFICANT CHANGE UP (ref 135–145)
WBC # BLD: 11.13 K/UL — HIGH (ref 3.8–10.5)
WBC # FLD AUTO: 11.13 K/UL — HIGH (ref 3.8–10.5)

## 2025-04-29 PROCEDURE — 71275 CT ANGIOGRAPHY CHEST: CPT | Mod: 26

## 2025-04-29 PROCEDURE — 99232 SBSQ HOSP IP/OBS MODERATE 35: CPT

## 2025-04-29 PROCEDURE — 93970 EXTREMITY STUDY: CPT | Mod: 26

## 2025-04-29 RX ORDER — ENOXAPARIN SODIUM 100 MG/ML
100 INJECTION SUBCUTANEOUS EVERY 12 HOURS
Refills: 0 | Status: DISCONTINUED | OUTPATIENT
Start: 2025-04-29 | End: 2025-05-01

## 2025-04-29 RX ADMIN — PREGABALIN 50 MILLIGRAM(S): 75 CAPSULE ORAL at 21:30

## 2025-04-29 RX ADMIN — Medication 1000 MILLIGRAM(S): at 13:17

## 2025-04-29 RX ADMIN — POLYETHYLENE GLYCOL 3350 17 GRAM(S): 17 POWDER, FOR SOLUTION ORAL at 11:28

## 2025-04-29 RX ADMIN — Medication 4 MILLIGRAM(S): at 11:28

## 2025-04-29 RX ADMIN — Medication 1000 MILLIGRAM(S): at 06:05

## 2025-04-29 RX ADMIN — ATORVASTATIN CALCIUM 40 MILLIGRAM(S): 80 TABLET, FILM COATED ORAL at 21:30

## 2025-04-29 RX ADMIN — ENOXAPARIN SODIUM 100 MILLIGRAM(S): 100 INJECTION SUBCUTANEOUS at 21:30

## 2025-04-29 RX ADMIN — PREGABALIN 50 MILLIGRAM(S): 75 CAPSULE ORAL at 06:05

## 2025-04-29 RX ADMIN — Medication 50 MILLIGRAM(S): at 06:05

## 2025-04-29 RX ADMIN — METHOCARBAMOL 500 MILLIGRAM(S): 500 TABLET, FILM COATED ORAL at 10:56

## 2025-04-29 RX ADMIN — Medication 1000 MILLIGRAM(S): at 21:30

## 2025-04-29 RX ADMIN — Medication 4 MILLIGRAM(S): at 01:31

## 2025-04-29 RX ADMIN — METHOCARBAMOL 500 MILLIGRAM(S): 500 TABLET, FILM COATED ORAL at 01:31

## 2025-04-29 RX ADMIN — Medication 5 MILLIGRAM(S): at 01:31

## 2025-04-29 RX ADMIN — Medication 4 MILLIGRAM(S): at 18:27

## 2025-04-29 RX ADMIN — Medication 2 TABLET(S): at 21:30

## 2025-04-29 RX ADMIN — METHOCARBAMOL 500 MILLIGRAM(S): 500 TABLET, FILM COATED ORAL at 18:28

## 2025-04-29 RX ADMIN — PREGABALIN 50 MILLIGRAM(S): 75 CAPSULE ORAL at 13:17

## 2025-04-29 RX ADMIN — Medication 1000 MILLIGRAM(S): at 22:30

## 2025-04-29 NOTE — PROGRESS NOTE ADULT - SUBJECTIVE AND OBJECTIVE BOX
Feeling okay today. One drain still in.  Was able to walk with physical therapy.  Pain is controlled.     OVERNIGHT EVENTS: NAEO    Remaining ROS negative     PHYSICAL EXAM:    General: nad, sitting up in bed  HEENT: NC/AT; MMM  Cardiovascular: +S1/S2, RRR  Respiratory: CTA B/L; no W/R/R  Gastrointestinal: soft, NT/ND; +BSx4  Extremities: WWP  Neurological: speech fluent, no facial asymmetry  Psychiatric: pleasant mood and affect    VITAL SIGNS:  Vital Signs Last 24 Hrs  T(C): 36.9 (29 Apr 2025 12:10), Max: 37.6 (29 Apr 2025 01:27)  T(F): 98.5 (29 Apr 2025 12:10), Max: 99.7 (29 Apr 2025 01:27)  HR: 94 (29 Apr 2025 12:10) (84 - 104)  BP: 114/72 (29 Apr 2025 12:10) (100/65 - 114/72)  BP(mean): 81 (29 Apr 2025 05:48) (74 - 81)  RR: 18 (29 Apr 2025 12:10) (14 - 19)  SpO2: 97% (29 Apr 2025 12:10) (93% - 99%)    Parameters below as of 29 Apr 2025 12:10  Patient On (Oxygen Delivery Method): room air      MEDICATIONS:  MEDICATIONS  (STANDING):  acetaminophen     Tablet .. 1000 milliGRAM(s) Oral every 8 hours  atorvastatin 40 milliGRAM(s) Oral at bedtime  enoxaparin Injectable 40 milliGRAM(s) SubCutaneous every 24 hours  lactated ringers. 1000 milliLiter(s) (100 mL/Hr) IV Continuous <Continuous>  methocarbamol 500 milliGRAM(s) Oral every 8 hours  ondansetron   Disintegrating Tablet 4 milliGRAM(s) Oral every 6 hours  polyethylene glycol 3350 17 Gram(s) Oral daily  pregabalin 50 milliGRAM(s) Oral three times a day  senna 2 Tablet(s) Oral at bedtime  spironolactone 50 milliGRAM(s) Oral daily    MEDICATIONS  (PRN):  benzocaine/menthol Lozenge 1 Lozenge Oral every 3 hours PRN Sore Throat  bisacodyl 5 milliGRAM(s) Oral every 12 hours PRN Constipation  famotidine    Tablet 20 milliGRAM(s) Oral every 12 hours PRN Dyspepsia  HYDROmorphone  Injectable 0.5 milliGRAM(s) IV Push every 3 hours PRN Breakthrough pain  oxyCODONE    IR 5 milliGRAM(s) Oral every 6 hours PRN Moderate Pain (4 - 6)  oxyCODONE    IR 10 milliGRAM(s) Oral every 4 hours PRN Severe Pain (7 - 10)      ALLERGIES:  Allergies    latex (Rash)  penicillin (Rash)    Intolerances        LABS:                        8.8    11.13 )-----------( 169      ( 29 Apr 2025 06:13 )             27.7     04-29    143  |  104  |  11  ----------------------------<  102[H]  4.2   |  29  |  0.72    Ca    8.3[L]      29 Apr 2025 06:13        Urinalysis Basic - ( 29 Apr 2025 06:13 )    Color: x / Appearance: x / SG: x / pH: x  Gluc: 102 mg/dL / Ketone: x  / Bili: x / Urobili: x   Blood: x / Protein: x / Nitrite: x   Leuk Esterase: x / RBC: x / WBC x   Sq Epi: x / Non Sq Epi: x / Bacteria: x      CAPILLARY BLOOD GLUCOSE          RADIOLOGY & ADDITIONAL TESTS: Reviewed.

## 2025-04-29 NOTE — DISCHARGE NOTE NURSING/CASE MANAGEMENT/SOCIAL WORK - FINANCIAL ASSISTANCE
[facility] provides services at a reduced cost to those who are determined to be eligible through [facility]’s financial assistance program. Information regarding [facility]’s financial assistance program can be found by going to [email or web address] or by calling [phone].

## 2025-04-29 NOTE — PROGRESS NOTE ADULT - SUBJECTIVE AND OBJECTIVE BOX
Ortho Progress Note    Procedure: L2-Pelvis PSF on 4/24  Surgeon: Dr. Velazco    SUBJECTIVE: Pt seen and examined on morning rounds. S/p 1u PRBC yesterday with good response. Cleared PT as well.    Vital Signs Last 24 Hrs  T(C): 37.1 (29 Apr 2025 05:48), Max: 37.6 (29 Apr 2025 01:27)  T(F): 98.8 (29 Apr 2025 05:48), Max: 99.7 (29 Apr 2025 01:27)  HR: 86 (29 Apr 2025 05:48) (86 - 105)  BP: 108/64 (29 Apr 2025 05:48) (105/57 - 128/71)  BP(mean): 81 (29 Apr 2025 05:48) (74 - 92)  RR: 14 (29 Apr 2025 05:48) (14 - 19)  SpO2: 99% (29 Apr 2025 05:48) (93% - 99%)    Parameters below as of 29 Apr 2025 05:48  Patient On (Oxygen Delivery Method): nasal cannula  O2 Flow (L/min): 2    Physical Exam:  General: NAD, resting comfortably in bed  Drains: HVx1 holding suction  Pulses: 2+ DP  Sensation: SILT grossly L2-S1 bilaterally  Motor: 5/5 L2-S1 bilaterally    A/P: 69yFemale s/p L2-Pelvis PSF by Dr. ELVA Velazco on 04-24  - Stable  - S/p 1u PRBC on 4/28 with appropriate response  - Pain Control  - DVT ppx: SCDs, LVX   - Post op abx: Ancef completed  - Drains: HVx1, will remove today  - WBS: WBAT  - Cleared for HPT   - passed TOV  - DC home pending PT clearance and drain  - Standing XR Completed

## 2025-04-29 NOTE — PROGRESS NOTE ADULT - SUBJECTIVE AND OBJECTIVE BOX
Ortho Note    Subjective:  Pt comfortable without complaints, pain controlled with current pain medication regimen   Denies CP, SOB, N/V, numbness/tingling   Reviewed plan of care with patient at bedside    Vital Signs Last 24 Hrs  T(C): 36.7 (04-29-25 @ 08:15), Max: 36.7 (04-29-25 @ 08:15)  T(F): 98.1 (04-29-25 @ 08:15), Max: 98.1 (04-29-25 @ 08:15)  HR: 84 (04-29-25 @ 08:15) (84 - 84)  BP: 100/65 (04-29-25 @ 08:15) (100/65 - 100/65)  BP(mean): --  RR: 18 (04-29-25 @ 08:15) (18 - 18)  SpO2: 98% (04-29-25 @ 08:15) (98% - 98%)  AVSS    Objective:      Physical Exam:  General: Pt Alert and oriented, NAD  DSG C/D/I, HVx1  Pulses intact bilateral LE  Sensation: SILT L2-S1  Motor: EHL/FHL/TA/GS 5/5            Plan of Care:  A/P: 69yFemale  s/p L3/L4 and L5/S1 laminectomies, TLIF, L2-pelvis psf   - afebrile overnight wbcs 11.13- chest xray ordered- results-   FINDINGS/  IMPRESSION: Heart size, mediastinal and hilar contours are unchanged and limited due to the low lung volumes. Bibasilar perihilar linear bands of atelectasis which appear improved from prior imaging. No consolidation, large effusions or pneumothorax. Marked bilateral glenohumeral arthrosis.  - UA- negative   - Pain Control- tylenol 1000mg PO Q8h, Dilaudid 0.5mg Q3h prn breakthrough pain, methocarbamol 500mg PO Q8h, Oxycodone 5-10mg Po Q4h prn moderate to severe pain, lyrica 50mg Po TID   - DVT ppx: Lovenox 40mg Subq Q24h  - PT, WBS: WBAT  - appreciate medicine recs  - bowel regimen, IS use, PPI   - DC drains 4-  - standing xray completed 4-  - hgb 8.8/27.7 continue to trend  - cleared pt 4-  - Dispo- home pending pt clearance, dc of drains,     Ortho Pager [phone]     Ortho Note    Subjective:  Pt comfortable without complaints, pain controlled with current pain medication regimen   Denies CP, SOB, N/V, numbness/tingling   Reviewed plan of care with patient at bedside    Vital Signs Last 24 Hrs  T(C): 36.7 (04-29-25 @ 08:15), Max: 36.7 (04-29-25 @ 08:15)  T(F): 98.1 (04-29-25 @ 08:15), Max: 98.1 (04-29-25 @ 08:15)  HR: 84 (04-29-25 @ 08:15) (84 - 84)  BP: 100/65 (04-29-25 @ 08:15) (100/65 - 100/65)  BP(mean): --  RR: 18 (04-29-25 @ 08:15) (18 - 18)  SpO2: 98% (04-29-25 @ 08:15) (98% - 98%)  AVSS    Objective:      Physical Exam:  General: Pt Alert and oriented, NAD  DSG C/D/I, HVx1  Pulses intact bilateral LE  Sensation: SILT L2-S1  Motor: EHL/FHL/TA/GS 5/5            Plan of Care:  A/P: 69yFemale  s/p L3/L4 and L5/S1 laminectomies, TLIF, L2-pelvis psf   - afebrile overnight wbcs 11.13- chest xray ordered- results-   FINDINGS/  IMPRESSION: Heart size, mediastinal and hilar contours are unchanged and limited due to the low lung volumes. Bibasilar perihilar linear bands of atelectasis which appear improved from prior imaging. No consolidation, large effusions or pneumothorax. Marked bilateral glenohumeral arthrosis.  - UA- negative   - Pain Control- tylenol 1000mg PO Q8h, Dilaudid 0.5mg Q3h prn breakthrough pain, methocarbamol 500mg PO Q8h, Oxycodone 5-10mg Po Q4h prn moderate to severe pain, lyrica 50mg Po TID   - DVT ppx: Lovenox 40mg Subq Q24h  - PT, WBS: WBAT  - appreciate medicine recs  - bowel regimen, IS use, PPI   - DC drains 4-  - standing xray completed 4-  - hgb 8.8/27.7 continue to trend  - cleared pt 4-  - discussed plan of care with medicine, due to tachycardia and atelectasis duplex and CTA ordered-   - Dispo- home pending pt clearance, dc of drains,     Ortho Pager [phone]

## 2025-04-30 LAB
ADD ON TEST-SPECIMEN IN LAB: SIGNIFICANT CHANGE UP
ANION GAP SERPL CALC-SCNC: 8 MMOL/L — SIGNIFICANT CHANGE UP (ref 5–17)
BUN SERPL-MCNC: 10 MG/DL — SIGNIFICANT CHANGE UP (ref 7–23)
CALCIUM SERPL-MCNC: 8.3 MG/DL — LOW (ref 8.4–10.5)
CHLORIDE SERPL-SCNC: 106 MMOL/L — SIGNIFICANT CHANGE UP (ref 96–108)
CO2 SERPL-SCNC: 27 MMOL/L — SIGNIFICANT CHANGE UP (ref 22–31)
CREAT SERPL-MCNC: 0.84 MG/DL — SIGNIFICANT CHANGE UP (ref 0.5–1.3)
EGFR: 76 ML/MIN/1.73M2 — SIGNIFICANT CHANGE UP
EGFR: 76 ML/MIN/1.73M2 — SIGNIFICANT CHANGE UP
GLUCOSE SERPL-MCNC: 94 MG/DL — SIGNIFICANT CHANGE UP (ref 70–99)
HCT VFR BLD CALC: 25.1 % — LOW (ref 34.5–45)
HCT VFR BLD CALC: 29.2 % — LOW (ref 34.5–45)
HGB BLD-MCNC: 8 G/DL — LOW (ref 11.5–15.5)
HGB BLD-MCNC: 9.1 G/DL — LOW (ref 11.5–15.5)
MCHC RBC-ENTMCNC: 29.4 PG — SIGNIFICANT CHANGE UP (ref 27–34)
MCHC RBC-ENTMCNC: 29.9 PG — SIGNIFICANT CHANGE UP (ref 27–34)
MCHC RBC-ENTMCNC: 31.2 G/DL — LOW (ref 32–36)
MCHC RBC-ENTMCNC: 31.9 G/DL — LOW (ref 32–36)
MCV RBC AUTO: 93.7 FL — SIGNIFICANT CHANGE UP (ref 80–100)
MCV RBC AUTO: 94.5 FL — SIGNIFICANT CHANGE UP (ref 80–100)
NRBC BLD AUTO-RTO: 0 /100 WBCS — SIGNIFICANT CHANGE UP (ref 0–0)
NRBC BLD AUTO-RTO: 0 /100 WBCS — SIGNIFICANT CHANGE UP (ref 0–0)
PLATELET # BLD AUTO: 179 K/UL — SIGNIFICANT CHANGE UP (ref 150–400)
PLATELET # BLD AUTO: 186 K/UL — SIGNIFICANT CHANGE UP (ref 150–400)
POTASSIUM SERPL-MCNC: 4.1 MMOL/L — SIGNIFICANT CHANGE UP (ref 3.5–5.3)
POTASSIUM SERPL-SCNC: 4.1 MMOL/L — SIGNIFICANT CHANGE UP (ref 3.5–5.3)
RBC # BLD: 2.68 M/UL — LOW (ref 3.8–5.2)
RBC # BLD: 3.09 M/UL — LOW (ref 3.8–5.2)
RBC # FLD: 17.9 % — HIGH (ref 10.3–14.5)
RBC # FLD: 18.1 % — HIGH (ref 10.3–14.5)
SODIUM SERPL-SCNC: 141 MMOL/L — SIGNIFICANT CHANGE UP (ref 135–145)
WBC # BLD: 11.01 K/UL — HIGH (ref 3.8–10.5)
WBC # BLD: 11.53 K/UL — HIGH (ref 3.8–10.5)
WBC # FLD AUTO: 11.01 K/UL — HIGH (ref 3.8–10.5)
WBC # FLD AUTO: 11.53 K/UL — HIGH (ref 3.8–10.5)

## 2025-04-30 PROCEDURE — 99233 SBSQ HOSP IP/OBS HIGH 50: CPT

## 2025-04-30 RX ORDER — OXYCODONE HYDROCHLORIDE 30 MG/1
10 TABLET ORAL EVERY 4 HOURS
Refills: 0 | Status: DISCONTINUED | OUTPATIENT
Start: 2025-04-30 | End: 2025-05-01

## 2025-04-30 RX ORDER — OXYCODONE HYDROCHLORIDE 30 MG/1
5 TABLET ORAL EVERY 6 HOURS
Refills: 0 | Status: DISCONTINUED | OUTPATIENT
Start: 2025-04-30 | End: 2025-05-01

## 2025-04-30 RX ORDER — PREGABALIN 75 MG/1
50 CAPSULE ORAL THREE TIMES A DAY
Refills: 0 | Status: DISCONTINUED | OUTPATIENT
Start: 2025-04-30 | End: 2025-05-01

## 2025-04-30 RX ADMIN — METHOCARBAMOL 500 MILLIGRAM(S): 500 TABLET, FILM COATED ORAL at 18:28

## 2025-04-30 RX ADMIN — ENOXAPARIN SODIUM 100 MILLIGRAM(S): 100 INJECTION SUBCUTANEOUS at 21:59

## 2025-04-30 RX ADMIN — OXYCODONE HYDROCHLORIDE 5 MILLIGRAM(S): 30 TABLET ORAL at 16:39

## 2025-04-30 RX ADMIN — Medication 1000 MILLIGRAM(S): at 21:59

## 2025-04-30 RX ADMIN — ENOXAPARIN SODIUM 100 MILLIGRAM(S): 100 INJECTION SUBCUTANEOUS at 09:37

## 2025-04-30 RX ADMIN — OXYCODONE HYDROCHLORIDE 5 MILLIGRAM(S): 30 TABLET ORAL at 06:45

## 2025-04-30 RX ADMIN — PREGABALIN 50 MILLIGRAM(S): 75 CAPSULE ORAL at 21:59

## 2025-04-30 RX ADMIN — METHOCARBAMOL 500 MILLIGRAM(S): 500 TABLET, FILM COATED ORAL at 03:00

## 2025-04-30 RX ADMIN — Medication 50 MILLIGRAM(S): at 05:44

## 2025-04-30 RX ADMIN — Medication 1000 MILLIGRAM(S): at 05:45

## 2025-04-30 RX ADMIN — PREGABALIN 50 MILLIGRAM(S): 75 CAPSULE ORAL at 15:01

## 2025-04-30 RX ADMIN — OXYCODONE HYDROCHLORIDE 5 MILLIGRAM(S): 30 TABLET ORAL at 05:45

## 2025-04-30 RX ADMIN — OXYCODONE HYDROCHLORIDE 5 MILLIGRAM(S): 30 TABLET ORAL at 17:35

## 2025-04-30 RX ADMIN — Medication 2 TABLET(S): at 21:59

## 2025-04-30 RX ADMIN — Medication 1000 MILLIGRAM(S): at 15:01

## 2025-04-30 RX ADMIN — PREGABALIN 50 MILLIGRAM(S): 75 CAPSULE ORAL at 05:41

## 2025-04-30 RX ADMIN — Medication 1000 MILLIGRAM(S): at 06:45

## 2025-04-30 RX ADMIN — Medication 4 MILLIGRAM(S): at 05:44

## 2025-04-30 RX ADMIN — POLYETHYLENE GLYCOL 3350 17 GRAM(S): 17 POWDER, FOR SOLUTION ORAL at 12:00

## 2025-04-30 RX ADMIN — ATORVASTATIN CALCIUM 40 MILLIGRAM(S): 80 TABLET, FILM COATED ORAL at 21:59

## 2025-04-30 RX ADMIN — METHOCARBAMOL 500 MILLIGRAM(S): 500 TABLET, FILM COATED ORAL at 09:37

## 2025-04-30 NOTE — PROGRESS NOTE ADULT - SUBJECTIVE AND OBJECTIVE BOX
feeling okay today.  denies any shortness of breath or chest pain.  discussed CTA results.     OVERNIGHT EVENTS: NAEO    Remaining ROS negative       PHYSICAL EXAM:    General: nad, sitting up in bed  HEENT: NC/AT; MMM  Cardiovascular: +S1/S2, RRR  Respiratory: CTA B/L; no W/R/R  Gastrointestinal: soft, NT/ND; +BSx4  Extremities: WWP  Neurological: speech fluent, no facial asymmetry  Psychiatric: pleasant mood and affect      VITAL SIGNS:  Vital Signs Last 24 Hrs  T(C): 36.7 (30 Apr 2025 11:39), Max: 37.5 (30 Apr 2025 05:38)  T(F): 98.1 (30 Apr 2025 11:39), Max: 99.5 (30 Apr 2025 05:38)  HR: 91 (30 Apr 2025 11:39) (91 - 104)  BP: 107/56 (30 Apr 2025 11:39) (97/55 - 110/59)  BP(mean): 75 (30 Apr 2025 05:38) (74 - 77)  RR: 14 (30 Apr 2025 11:39) (14 - 20)  SpO2: 98% (30 Apr 2025 11:39) (93% - 99%)    Parameters below as of 30 Apr 2025 11:39  Patient On (Oxygen Delivery Method): room air          MEDICATIONS:  MEDICATIONS  (STANDING):  acetaminophen     Tablet .. 1000 milliGRAM(s) Oral every 8 hours  atorvastatin 40 milliGRAM(s) Oral at bedtime  enoxaparin Injectable 100 milliGRAM(s) SubCutaneous every 12 hours  methocarbamol 500 milliGRAM(s) Oral every 8 hours  ondansetron   Disintegrating Tablet 4 milliGRAM(s) Oral every 6 hours  polyethylene glycol 3350 17 Gram(s) Oral daily  pregabalin 50 milliGRAM(s) Oral three times a day  senna 2 Tablet(s) Oral at bedtime  spironolactone 50 milliGRAM(s) Oral daily    MEDICATIONS  (PRN):  benzocaine/menthol Lozenge 1 Lozenge Oral every 3 hours PRN Sore Throat  bisacodyl 5 milliGRAM(s) Oral every 12 hours PRN Constipation  famotidine    Tablet 20 milliGRAM(s) Oral every 12 hours PRN Dyspepsia  HYDROmorphone  Injectable 0.5 milliGRAM(s) IV Push every 3 hours PRN Breakthrough pain  oxyCODONE    IR 5 milliGRAM(s) Oral every 6 hours PRN Moderate Pain (4 - 6)  oxyCODONE    IR 10 milliGRAM(s) Oral every 4 hours PRN Severe Pain (7 - 10)      ALLERGIES:  Allergies    latex (Rash)  penicillin (Rash)    Intolerances        LABS:                        8.0    11.53 )-----------( 179      ( 30 Apr 2025 05:30 )             25.1     04-30    141  |  106  |  10  ----------------------------<  94  4.1   |  27  |  0.84    Ca    8.3[L]      30 Apr 2025 05:30        Urinalysis Basic - ( 30 Apr 2025 05:30 )    Color: x / Appearance: x / SG: x / pH: x  Gluc: 94 mg/dL / Ketone: x  / Bili: x / Urobili: x   Blood: x / Protein: x / Nitrite: x   Leuk Esterase: x / RBC: x / WBC x   Sq Epi: x / Non Sq Epi: x / Bacteria: x      CAPILLARY BLOOD GLUCOSE          RADIOLOGY & ADDITIONAL TESTS: Reviewed.

## 2025-04-30 NOTE — PROGRESS NOTE ADULT - SUBJECTIVE AND OBJECTIVE BOX
POST OPERATIVE DAY #: 6  STATUS POST:  L3/L4 and L5/S1 laminectomies, TLIF, L2-pelvis psf on 4/24                 SUBJECTIVE: Patient seen and examined. Pt. states she will still get a little short of breath yesterday walking with PT, but knows now she has a PE. Otherwise feels good. Pain is controlled.   Denies any sob/cp/n/v/numbness or tingling in b/l les.     OBJECTIVE:     Vital Signs Last 24 Hrs  T(C): 37.2 (30 Apr 2025 08:15), Max: 37.5 (30 Apr 2025 05:38)  T(F): 98.9 (30 Apr 2025 08:15), Max: 99.5 (30 Apr 2025 05:38)  HR: 98 (30 Apr 2025 08:15) (92 - 104)  BP: 103/56 (30 Apr 2025 08:15) (97/57 - 114/72)  BP(mean): 75 (30 Apr 2025 05:38) (74 - 77)  RR: 14 (30 Apr 2025 08:15) (14 - 20)  SpO2: 96% (30 Apr 2025 08:15) (93% - 99%)    Parameters below as of 30 Apr 2025 08:15  Patient On (Oxygen Delivery Method): room air        General: NAD sitting at the edge of bed  Affected extremity: b/l les no erythema/ecchymosis/sts  Dressing: clean/dry/intact aquacel in place, gauze/teg over drain site   Sensation: intact to light touch to patient's baseline  Motor exam: EHL/TA/GS  5/5 b/l les  Pulses 2+             I&O's Detail    29 Apr 2025 07:01  -  30 Apr 2025 07:00  --------------------------------------------------------  IN:    Oral Fluid: 100 mL  Total IN: 100 mL    OUT:    Drain (mL): 0 mL    Voided (mL): 200 mL  Total OUT: 200 mL    Total NET: -100 mL          LABS:                        8.0    11.53 )-----------( 179      ( 30 Apr 2025 05:30 )             25.1     04-30    141  |  106  |  10  ----------------------------<  94  4.1   |  27  |  0.84    Ca    8.3[L]      30 Apr 2025 05:30        Urinalysis Basic - ( 30 Apr 2025 05:30 )    Color: x / Appearance: x / SG: x / pH: x  Gluc: 94 mg/dL / Ketone: x  / Bili: x / Urobili: x   Blood: x / Protein: x / Nitrite: x   Leuk Esterase: x / RBC: x / WBC x   Sq Epi: x / Non Sq Epi: x / Bacteria: x        MEDICATIONS:    acetaminophen     Tablet .. 1000 milliGRAM(s) Oral every 8 hours  HYDROmorphone  Injectable 0.5 milliGRAM(s) IV Push every 3 hours PRN  methocarbamol 500 milliGRAM(s) Oral every 8 hours  ondansetron   Disintegrating Tablet 4 milliGRAM(s) Oral every 6 hours  oxyCODONE    IR 5 milliGRAM(s) Oral every 6 hours PRN  oxyCODONE    IR 10 milliGRAM(s) Oral every 4 hours PRN  pregabalin 50 milliGRAM(s) Oral three times a day    enoxaparin Injectable 100 milliGRAM(s) SubCutaneous every 12 hours        ASSESSMENT AND PLAN: 68yo Female s/p  L3/L4 and L5/S1 laminectomies, TLIF, L2-pelvis psf on 4/24         - Analgesic pain control  - DVT prophylaxis:    Lovenox 100mg bid 2/2 to PE, if stable can start eliquis tomorrow per Dr. Velazco    SCDs        - Weight Bearing Status:  Weight bearing as tolerated    - Disposition: Home cleared PT.   - Dressing change today (betadine)  - Will transfuse 1u prbc today, follow up cbc post transfusion.  - Add on BNP/trops. Will determine if patient needs echo per Dr. Jennings.  POST OPERATIVE DAY #: 6  STATUS POST:  L3/L4 and L5/S1 laminectomies, TLIF, L2-pelvis psf on 4/24                 SUBJECTIVE: Patient seen and examined. Pt. states she will still get a little short of breath yesterday walking with PT, but knows now she has a PE. Otherwise feels good. Pain is controlled.   Denies any sob/cp/n/v/numbness or tingling in b/l les.     OBJECTIVE:     Vital Signs Last 24 Hrs  T(C): 37.2 (30 Apr 2025 08:15), Max: 37.5 (30 Apr 2025 05:38)  T(F): 98.9 (30 Apr 2025 08:15), Max: 99.5 (30 Apr 2025 05:38)  HR: 98 (30 Apr 2025 08:15) (92 - 104)  BP: 103/56 (30 Apr 2025 08:15) (97/57 - 114/72)  BP(mean): 75 (30 Apr 2025 05:38) (74 - 77)  RR: 14 (30 Apr 2025 08:15) (14 - 20)  SpO2: 96% (30 Apr 2025 08:15) (93% - 99%)    Parameters below as of 30 Apr 2025 08:15  Patient On (Oxygen Delivery Method): room air        General: NAD sitting at the edge of bed  Affected extremity: b/l les no erythema/ecchymosis/sts  Dressing: clean/dry/intact aquacel in place, gauze/teg over drain site   Sensation: intact to light touch to patient's baseline  Motor exam: EHL/TA/GS  5/5 b/l les  Pulses 2+             I&O's Detail    29 Apr 2025 07:01  -  30 Apr 2025 07:00  --------------------------------------------------------  IN:    Oral Fluid: 100 mL  Total IN: 100 mL    OUT:    Drain (mL): 0 mL    Voided (mL): 200 mL  Total OUT: 200 mL    Total NET: -100 mL          LABS:                        8.0    11.53 )-----------( 179      ( 30 Apr 2025 05:30 )             25.1     04-30    141  |  106  |  10  ----------------------------<  94  4.1   |  27  |  0.84    Ca    8.3[L]      30 Apr 2025 05:30        Urinalysis Basic - ( 30 Apr 2025 05:30 )    Color: x / Appearance: x / SG: x / pH: x  Gluc: 94 mg/dL / Ketone: x  / Bili: x / Urobili: x   Blood: x / Protein: x / Nitrite: x   Leuk Esterase: x / RBC: x / WBC x   Sq Epi: x / Non Sq Epi: x / Bacteria: x        MEDICATIONS:    acetaminophen     Tablet .. 1000 milliGRAM(s) Oral every 8 hours  HYDROmorphone  Injectable 0.5 milliGRAM(s) IV Push every 3 hours PRN  methocarbamol 500 milliGRAM(s) Oral every 8 hours  ondansetron   Disintegrating Tablet 4 milliGRAM(s) Oral every 6 hours  oxyCODONE    IR 5 milliGRAM(s) Oral every 6 hours PRN  oxyCODONE    IR 10 milliGRAM(s) Oral every 4 hours PRN  pregabalin 50 milliGRAM(s) Oral three times a day    enoxaparin Injectable 100 milliGRAM(s) SubCutaneous every 12 hours        ASSESSMENT AND PLAN: 68yo Female s/p  L3/L4 and L5/S1 laminectomies, TLIF, L2-pelvis psf on 4/24         - Analgesic pain control  - DVT prophylaxis:    Lovenox 100mg bid 2/2 to PE, if stable can start eliquis tomorrow per Dr. Velazco    SCDs        - Weight Bearing Status:  Weight bearing as tolerated    - Disposition: Home cleared PT.   - Dressing change today (betadine), gauze/teg using aseptic technique   - Will transfuse 1u prbc today, follow up cbc post transfusion at 6pm.   - Add on BNP/trops. Will determine if patient needs echo per Dr. Jennings.- Will get echo and f/u

## 2025-04-30 NOTE — PROGRESS NOTE ADULT - SUBJECTIVE AND OBJECTIVE BOX
Ortho Note    Pt comfortable without complaints, pain controlled  Denies CP, SOB, N/V, new numbness/tingling     Vital Signs Last 24 Hrs  T(C): 37.5 (04-30-25 @ 05:38), Max: 37.5 (04-30-25 @ 05:38)  T(F): 99.5 (04-30-25 @ 05:38), Max: 99.5 (04-30-25 @ 05:38)  HR: 92 (04-30-25 @ 05:38) (92 - 95)  BP: 101/57 (04-30-25 @ 05:38) (99/56 - 101/57)  BP(mean): 75 (04-30-25 @ 05:38) (74 - 75)  RR: 17 (04-30-25 @ 05:38) (15 - 17)  SpO2: 99% (04-30-25 @ 05:38) (93% - 99%)  I&O's Summary    28 Apr 2025 07:01  -  29 Apr 2025 07:00  --------------------------------------------------------  IN: 700 mL / OUT: 1700 mL / NET: -1000 mL    29 Apr 2025 07:01  -  30 Apr 2025 06:03  --------------------------------------------------------  IN: 100 mL / OUT: 200 mL / NET: -100 mL        Physical Exam:  General: NAD, resting comfortably in bed  Drains: dcd  Pulses: 2+ DP  Sensation: SILT grossly L2-S1 bilaterally  Motor: 5/5 L2-S1 bilaterally                        8.8    11.13 )-----------( 169      ( 29 Apr 2025 06:13 )             27.7     04-29    143  |  104  |  11  ----------------------------<  102[H]  4.2   |  29  |  0.72    Ca    8.3[L]      29 Apr 2025 06:13        A/P: 69yFemale s/p L2-Pelvis PSF by Dr. ELVA Velazco on 04-24  - Stable  - S/p 1u PRBC on 4/28 with appropriate response  - Pain Control  - DVT ppx: SCDs, LVX   - Post op abx: Ancef completed  - Drains: HV dcd  +PE, therep lovenox  - WBS: WBAT  - Cleared for HPT   - passed TOV  - DC home pending PT clearance and drain  - Standing XR Completed  Dispo: Pend med recs for final PE tx  Ortho Pager [phone]

## 2025-05-01 ENCOUNTER — RESULT REVIEW (OUTPATIENT)
Age: 70
End: 2025-05-01

## 2025-05-01 VITALS — WEIGHT: 227.08 LBS

## 2025-05-01 LAB
ANION GAP SERPL CALC-SCNC: 11 MMOL/L — SIGNIFICANT CHANGE UP (ref 5–17)
BUN SERPL-MCNC: 13 MG/DL — SIGNIFICANT CHANGE UP (ref 7–23)
CALCIUM SERPL-MCNC: 8.3 MG/DL — LOW (ref 8.4–10.5)
CHLORIDE SERPL-SCNC: 105 MMOL/L — SIGNIFICANT CHANGE UP (ref 96–108)
CO2 SERPL-SCNC: 25 MMOL/L — SIGNIFICANT CHANGE UP (ref 22–31)
CREAT SERPL-MCNC: 0.77 MG/DL — SIGNIFICANT CHANGE UP (ref 0.5–1.3)
EGFR: 84 ML/MIN/1.73M2 — SIGNIFICANT CHANGE UP
EGFR: 84 ML/MIN/1.73M2 — SIGNIFICANT CHANGE UP
GLUCOSE SERPL-MCNC: 114 MG/DL — HIGH (ref 70–99)
HCT VFR BLD CALC: 28.2 % — LOW (ref 34.5–45)
HGB BLD-MCNC: 8.9 G/DL — LOW (ref 11.5–15.5)
MCHC RBC-ENTMCNC: 30.3 PG — SIGNIFICANT CHANGE UP (ref 27–34)
MCHC RBC-ENTMCNC: 31.6 G/DL — LOW (ref 32–36)
MCV RBC AUTO: 95.9 FL — SIGNIFICANT CHANGE UP (ref 80–100)
NRBC BLD AUTO-RTO: 0 /100 WBCS — SIGNIFICANT CHANGE UP (ref 0–0)
PLATELET # BLD AUTO: 200 K/UL — SIGNIFICANT CHANGE UP (ref 150–400)
POTASSIUM SERPL-MCNC: 4.3 MMOL/L — SIGNIFICANT CHANGE UP (ref 3.5–5.3)
POTASSIUM SERPL-SCNC: 4.3 MMOL/L — SIGNIFICANT CHANGE UP (ref 3.5–5.3)
RBC # BLD: 2.94 M/UL — LOW (ref 3.8–5.2)
RBC # FLD: 18.1 % — HIGH (ref 10.3–14.5)
SODIUM SERPL-SCNC: 141 MMOL/L — SIGNIFICANT CHANGE UP (ref 135–145)
WBC # BLD: 10.56 K/UL — HIGH (ref 3.8–10.5)
WBC # FLD AUTO: 10.56 K/UL — HIGH (ref 3.8–10.5)

## 2025-05-01 PROCEDURE — 72100 X-RAY EXAM L-S SPINE 2/3 VWS: CPT

## 2025-05-01 PROCEDURE — 99233 SBSQ HOSP IP/OBS HIGH 50: CPT

## 2025-05-01 PROCEDURE — 71275 CT ANGIOGRAPHY CHEST: CPT | Mod: MC

## 2025-05-01 PROCEDURE — 93306 TTE W/DOPPLER COMPLETE: CPT | Mod: 26

## 2025-05-01 PROCEDURE — 81003 URINALYSIS AUTO W/O SCOPE: CPT

## 2025-05-01 PROCEDURE — 85025 COMPLETE CBC W/AUTO DIFF WBC: CPT

## 2025-05-01 PROCEDURE — 36415 COLL VENOUS BLD VENIPUNCTURE: CPT

## 2025-05-01 PROCEDURE — 86923 COMPATIBILITY TEST ELECTRIC: CPT

## 2025-05-01 PROCEDURE — 86901 BLOOD TYPING SEROLOGIC RH(D): CPT

## 2025-05-01 PROCEDURE — 83880 ASSAY OF NATRIURETIC PEPTIDE: CPT

## 2025-05-01 PROCEDURE — P9016: CPT

## 2025-05-01 PROCEDURE — 84484 ASSAY OF TROPONIN QUANT: CPT

## 2025-05-01 PROCEDURE — 0225U NFCT DS DNA&RNA 21 SARSCOV2: CPT

## 2025-05-01 PROCEDURE — C8929: CPT

## 2025-05-01 PROCEDURE — P9045: CPT

## 2025-05-01 PROCEDURE — 76380 CAT SCAN FOLLOW-UP STUDY: CPT

## 2025-05-01 PROCEDURE — 86850 RBC ANTIBODY SCREEN: CPT

## 2025-05-01 PROCEDURE — 97116 GAIT TRAINING THERAPY: CPT

## 2025-05-01 PROCEDURE — 71045 X-RAY EXAM CHEST 1 VIEW: CPT

## 2025-05-01 PROCEDURE — 85027 COMPLETE CBC AUTOMATED: CPT

## 2025-05-01 PROCEDURE — C1889: CPT

## 2025-05-01 PROCEDURE — 97165 OT EVAL LOW COMPLEX 30 MIN: CPT

## 2025-05-01 PROCEDURE — 84295 ASSAY OF SERUM SODIUM: CPT

## 2025-05-01 PROCEDURE — 97162 PT EVAL MOD COMPLEX 30 MIN: CPT

## 2025-05-01 PROCEDURE — 84132 ASSAY OF SERUM POTASSIUM: CPT

## 2025-05-01 PROCEDURE — 93970 EXTREMITY STUDY: CPT

## 2025-05-01 PROCEDURE — C1713: CPT

## 2025-05-01 PROCEDURE — 82947 ASSAY GLUCOSE BLOOD QUANT: CPT

## 2025-05-01 PROCEDURE — 97535 SELF CARE MNGMENT TRAINING: CPT

## 2025-05-01 PROCEDURE — 82330 ASSAY OF CALCIUM: CPT

## 2025-05-01 PROCEDURE — P9059: CPT

## 2025-05-01 PROCEDURE — 82805 BLOOD GASES W/O2 SATURATION: CPT

## 2025-05-01 PROCEDURE — 86900 BLOOD TYPING SEROLOGIC ABO: CPT

## 2025-05-01 PROCEDURE — 80048 BASIC METABOLIC PNL TOTAL CA: CPT

## 2025-05-01 PROCEDURE — 36430 TRANSFUSION BLD/BLD COMPNT: CPT

## 2025-05-01 RX ORDER — ATORVASTATIN CALCIUM 80 MG/1
1 TABLET, FILM COATED ORAL
Qty: 0 | Refills: 0 | DISCHARGE
Start: 2025-05-01

## 2025-05-01 RX ORDER — ACETAMINOPHEN 500 MG/5ML
2 LIQUID (ML) ORAL
Qty: 42 | Refills: 0
Start: 2025-05-01 | End: 2025-05-07

## 2025-05-01 RX ORDER — OXYCODONE HYDROCHLORIDE 30 MG/1
1 TABLET ORAL
Qty: 28 | Refills: 0
Start: 2025-05-01 | End: 2025-05-07

## 2025-05-01 RX ORDER — METHOCARBAMOL 500 MG/1
1 TABLET, FILM COATED ORAL
Qty: 21 | Refills: 0
Start: 2025-05-01 | End: 2025-05-07

## 2025-05-01 RX ORDER — PREGABALIN 75 MG/1
1 CAPSULE ORAL
Qty: 21 | Refills: 0
Start: 2025-05-01 | End: 2025-05-07

## 2025-05-01 RX ORDER — SPIRONOLACTONE 25 MG
2 TABLET ORAL
Qty: 0 | Refills: 0 | DISCHARGE
Start: 2025-05-01

## 2025-05-01 RX ORDER — APIXABAN 2.5 MG/1
1 TABLET, FILM COATED ORAL
Qty: 180 | Refills: 0
Start: 2025-05-01 | End: 2025-07-29

## 2025-05-01 RX ORDER — SENNA 187 MG
2 TABLET ORAL
Qty: 14 | Refills: 0
Start: 2025-05-01 | End: 2025-05-07

## 2025-05-01 RX ADMIN — Medication 50 MILLIGRAM(S): at 05:24

## 2025-05-01 RX ADMIN — Medication 1000 MILLIGRAM(S): at 14:54

## 2025-05-01 RX ADMIN — Medication 1000 MILLIGRAM(S): at 05:24

## 2025-05-01 RX ADMIN — Medication 4 MILLIGRAM(S): at 05:24

## 2025-05-01 RX ADMIN — PREGABALIN 50 MILLIGRAM(S): 75 CAPSULE ORAL at 05:24

## 2025-05-01 RX ADMIN — OXYCODONE HYDROCHLORIDE 5 MILLIGRAM(S): 30 TABLET ORAL at 03:40

## 2025-05-01 RX ADMIN — PREGABALIN 50 MILLIGRAM(S): 75 CAPSULE ORAL at 14:54

## 2025-05-01 RX ADMIN — METHOCARBAMOL 500 MILLIGRAM(S): 500 TABLET, FILM COATED ORAL at 10:39

## 2025-05-01 RX ADMIN — METHOCARBAMOL 500 MILLIGRAM(S): 500 TABLET, FILM COATED ORAL at 04:04

## 2025-05-01 RX ADMIN — POLYETHYLENE GLYCOL 3350 17 GRAM(S): 17 POWDER, FOR SOLUTION ORAL at 12:21

## 2025-05-01 RX ADMIN — OXYCODONE HYDROCHLORIDE 5 MILLIGRAM(S): 30 TABLET ORAL at 04:30

## 2025-05-01 RX ADMIN — ENOXAPARIN SODIUM 100 MILLIGRAM(S): 100 INJECTION SUBCUTANEOUS at 10:39

## 2025-05-01 NOTE — DIETITIAN INITIAL EVALUATION ADULT - PERTINENT LABORATORY DATA
05-01    141  |  105  |  13  ----------------------------<  114[H]  4.3   |  25  |  0.77    Ca    8.3[L]      01 May 2025 05:30

## 2025-05-01 NOTE — PROGRESS NOTE ADULT - SUBJECTIVE AND OBJECTIVE BOX
Ortho Note    Pt comfortable without complaints, pain controlled  Denies CP, SOB, N/V, new numbness/tingling     Vital Signs Last 24 Hrs  T(C): 36.8 (05-01-25 @ 05:10), Max: 36.8 (05-01-25 @ 05:10)  T(F): 98.2 (05-01-25 @ 05:10), Max: 98.2 (05-01-25 @ 05:10)  HR: 90 (05-01-25 @ 05:10) (90 - 100)  BP: 105/66 (05-01-25 @ 05:10) (100/60 - 105/66)  BP(mean): --  RR: 16 (05-01-25 @ 05:10) (16 - 18)  SpO2: 95% (05-01-25 @ 05:10) (95% - 95%)  I&O's Summary    29 Apr 2025 07:01  -  30 Apr 2025 07:00  --------------------------------------------------------  IN: 100 mL / OUT: 200 mL / NET: -100 mL    30 Apr 2025 07:01  -  01 May 2025 06:20  --------------------------------------------------------  IN: 0 mL / OUT: 200 mL / NET: -200 mL      Physical Exam:  General: NAD, resting comfortably in bed  Drains: dcd  Pulses: 2+ DP  Sensation: SILT grossly L2-S1 bilaterally  Motor: 5/5 L2-S1 bilaterally           Dressing changed this AM with betadine paint               8.9    10.56 )-----------( 200      ( 01 May 2025 05:30 )             28.2     04-30    141  |  106  |  10  ----------------------------<  94  4.1   |  27  |  0.84    Ca    8.3[L]      30 Apr 2025 05:30        A/P: 69yFemale s/p L2-Pelvis PSF by Dr. ELVA Velazco on 04-24  - Stable  - S/p 1u PRBC on 4/28 and 4/30 with appropriate response  - Pain Control  - DVT ppx: SCDs, LVX   - Post op abx: Ancef completed  - Drains: HV dcd  +PE, therep lovenox  - WBS: WBAT  - Cleared for HPT   Betadine daily dressing changes   - passed TOV  - DC home pending PT clearance and drain  - Standing XR Completed  Dispo: Pend Dr. Velazco decision to transition to eliquis     Ortho Pager [phone]

## 2025-05-01 NOTE — DIETITIAN INITIAL EVALUATION ADULT - OTHER INFO
"68yo f c/o back pain that is acute on chronic. Pt reports she had chronic low back pain that was treated with a spinal fusion with Dr Velazco 5 years ago. At that time she recovered well.  Overtime her knees became painful so she underwent a left TKA 2 years ago, and a right TKA 1 year ago with Dr. Mitchell.  In the last year, her low back pain has been progressing once again. It does not radiate down her legs. Noticing some tingling to her left toes intermittently lately. Denies weakness. Ambulates with a cane at baseline. States she did not need prescription strength opioids after 2 weeks postop from her knee replacements  and now takes tylenol and celebrex for pain."    Patient seen at bedside on 9wo for nutrition assessment. Reports good appetite prior to admission, denies changes in PO intake, endorses typically consuming 3 meals/day, denies following therapeutic diet / dietary restrictions at home - likely meeting >75% estimated nutrient needs at baseline. Current diet order: DASH diet. Reports appetite remains good and continuing to consume % of 3 meals/day. No known food allergies. No difficulty chewing/swallowing reported. Dosing weight: 227 pounds, denies wt changes PTA. No pressure injuries documented, vicky 20. No edema documented. Denies nausea/vomiting/diarrhea, states nausea from a few days ago resolved but complains of no bowel movement x2 days - on bowel regimen. Labs: glucose 114 mg/dl. Meds: zofran, miralax, senna, spironolactone, oxycodone PRN. Observed with no overt signs and symptoms of muscle or fat wasting - does not meet criteria for protein-calorie malnutrition as per ASPEN guidelines. No cultural, ethnic, Latter day food preferences, or difficulty with food cost / procurement reported. See nutrition recommendations below.

## 2025-05-01 NOTE — PROGRESS NOTE ADULT - SUBJECTIVE AND OBJECTIVE BOX
Ortho Note    Subjective:  Pt seen and examined today   denying surgical site pain   Denies CP, SOB, N/V, numbness/tingling   Reviewed plan of care with patient at bedside    Vital Signs Last 24 Hrs  T(C): 36.9 (05-01-25 @ 08:30), Max: 36.9 (05-01-25 @ 08:30)  T(F): 98.4 (05-01-25 @ 08:30), Max: 98.4 (05-01-25 @ 08:30)  HR: 85 (05-01-25 @ 08:30) (85 - 85)  BP: 105/54 (05-01-25 @ 08:30) (105/54 - 105/54)  BP(mean): --  RR: 15 (05-01-25 @ 08:30) (15 - 15)  SpO2: 96% (05-01-25 @ 08:30) (96% - 96%)  AVSS    Objective:    Physical Exam:  General: Pt Alert and oriented, NAD  DSG C/D/I, HVx1  Pulses intact bilateral LE  Sensation: SILT L2-S1  Motor: EHL/FHL/TA/GS 5/5        Plan of Care:  A/P: 69yFemale  s/p L3/L4 and L5/S1 laminectomies, TLIF, L2-pelvis psf   - afebrile overnight wbcs 10.56  - Pain Control- tylenol 1000mg PO Q8h, Dilaudid 0.5mg Q3h prn breakthrough pain, methocarbamol 500mg PO Q8h, Oxycodone 5-10mg Po Q4h prn moderate to severe pain, lyrica 50mg Po TID   - DVT ppx: Lovenox 100mg Subq Q12h, switch to eliquis today   - PT, WBS: WBAT  - appreciate medicine recs  - bowel regimen, IS use, PPI   - DC drains 4-  - standing xray completed 4-  - cleared pt 4-  - CT angio-   IMPRESSION:  1. Pulmonary embolism involving a right posterior basal segmental branch of the right lower lobe pulmonary artery. Small clot burden. No signs of right heart strain.  2. Bibasilar linear atelectasis/scarring possibly the sequela of prior bibasilar consolidation. No evidence of pulmonary hemorrhage or discrete pulmonary infarction.  3. Borderline dilated main pulmonary artery, as may be seen in setting of pulmonary hypertension. No significant right ventricular strain. - started on Lovenox will switch to Eliquis   - Dispo- home today     Ortho Pager [phone]

## 2025-05-01 NOTE — DIETITIAN INITIAL EVALUATION ADULT - PERTINENT MEDS FT
MEDICATIONS  (STANDING):  acetaminophen     Tablet .. 1000 milliGRAM(s) Oral every 8 hours  atorvastatin 40 milliGRAM(s) Oral at bedtime  methocarbamol 500 milliGRAM(s) Oral every 8 hours  ondansetron   Disintegrating Tablet 4 milliGRAM(s) Oral every 6 hours  polyethylene glycol 3350 17 Gram(s) Oral daily  pregabalin 50 milliGRAM(s) Oral three times a day  senna 2 Tablet(s) Oral at bedtime  spironolactone 50 milliGRAM(s) Oral daily    MEDICATIONS  (PRN):  benzocaine/menthol Lozenge 1 Lozenge Oral every 3 hours PRN Sore Throat  bisacodyl 5 milliGRAM(s) Oral every 12 hours PRN Constipation  famotidine    Tablet 20 milliGRAM(s) Oral every 12 hours PRN Dyspepsia  oxyCODONE    IR 5 milliGRAM(s) Oral every 6 hours PRN Moderate Pain (4 - 6)  oxyCODONE    IR 10 milliGRAM(s) Oral every 4 hours PRN Severe Pain (7 - 10)

## 2025-05-01 NOTE — DIETITIAN INITIAL EVALUATION ADULT - PERSON TAUGHT/METHOD
encouraged PO intake of high-protein foods to assist with recovery; discussed general healthy eating, pt receptive./verbal instruction/patient instructed

## 2025-05-01 NOTE — PROGRESS NOTE ADULT - PROVIDER SPECIALTY LIST ADULT
Hospitalist
Orthopedics
Hospitalist
Orthopedics
Hospitalist
Hospitalist
Orthopedics

## 2025-05-01 NOTE — DIETITIAN INITIAL EVALUATION ADULT - PROBLEM SELECTOR PLAN 1
Admit to Orthopaedic Service.  Presents today for elective L3-L4, L5-S1 LAMI, PSF L3-S1, TLIF L5-S1, L3-pelvic osteotomies   Pt medically stable and cleared for procedure today by Dr. Vásquez and Dr. Hutson (cardio)

## 2025-05-01 NOTE — DIETITIAN INITIAL EVALUATION ADULT - NSFNSGIIOFT_GEN_A_CORE
Type 2 diabetes mellitus with other specified complication, without long-term current use of insulin
I&O's Detail    30 Apr 2025 07:01  -  01 May 2025 07:00  --------------------------------------------------------  IN:  Total IN: 0 mL    OUT:    Voided (mL): 200 mL  Total OUT: 200 mL    Total NET: -200 mL      01 May 2025 07:01  -  01 May 2025 12:40  --------------------------------------------------------  IN:  Total IN: 0 mL    OUT:    Voided (mL): 300 mL  Total OUT: 300 mL    Total NET: -300 mL

## 2025-05-01 NOTE — PROGRESS NOTE ADULT - SUBJECTIVE AND OBJECTIVE BOX
***Note in progress***    OVERNIGHT EVENTS: NAEO    SUBJECTIVE / INTERVAL HPI: Patient seen and examined at bedside. Patient denying chest pain, SOB, palpitations, cough. Patient denies fever, chills, HA, Dizziness, change in vision/hearing, N/V, abdominal pain, diarrhea, constipation, hematochezia/melena, dysuria, hematuria, new onset weakness/numbness, LE pain and/or swelling.    Remaining ROS negative       PHYSICAL EXAM:    General: WDWN  HEENT: NC/AT; PERRL, anicteric sclera; MMM  Neck: supple  Cardiovascular: +S1/S2, RRR  Respiratory: CTA B/L; no W/R/R  Gastrointestinal: soft, NT/ND; +BSx4  Extremities: WWP; no edema, clubbing or cyanosis  Vascular: 2+ radial, DP/PT pulses B/L  Neurological: AAOx3; no focal deficits  Psychiatric: pleasant mood and affect  Dermatologic: no appreciable wounds or damage to the skin    VITAL SIGNS:  Vital Signs Last 24 Hrs  T(C): 36.9 (01 May 2025 08:30), Max: 37.9 (30 Apr 2025 17:26)  T(F): 98.4 (01 May 2025 08:30), Max: 100.3 (30 Apr 2025 17:26)  HR: 85 (01 May 2025 08:30) (75 - 102)  BP: 105/54 (01 May 2025 08:30) (97/55 - 120/75)  BP(mean): --  RR: 15 (01 May 2025 08:30) (14 - 18)  SpO2: 96% (01 May 2025 08:30) (95% - 98%)    Parameters below as of 01 May 2025 08:30  Patient On (Oxygen Delivery Method): room air          MEDICATIONS:  MEDICATIONS  (STANDING):  acetaminophen     Tablet .. 1000 milliGRAM(s) Oral every 8 hours  atorvastatin 40 milliGRAM(s) Oral at bedtime  enoxaparin Injectable 100 milliGRAM(s) SubCutaneous every 12 hours  methocarbamol 500 milliGRAM(s) Oral every 8 hours  ondansetron   Disintegrating Tablet 4 milliGRAM(s) Oral every 6 hours  polyethylene glycol 3350 17 Gram(s) Oral daily  pregabalin 50 milliGRAM(s) Oral three times a day  senna 2 Tablet(s) Oral at bedtime  spironolactone 50 milliGRAM(s) Oral daily    MEDICATIONS  (PRN):  benzocaine/menthol Lozenge 1 Lozenge Oral every 3 hours PRN Sore Throat  bisacodyl 5 milliGRAM(s) Oral every 12 hours PRN Constipation  famotidine    Tablet 20 milliGRAM(s) Oral every 12 hours PRN Dyspepsia  oxyCODONE    IR 5 milliGRAM(s) Oral every 6 hours PRN Moderate Pain (4 - 6)  oxyCODONE    IR 10 milliGRAM(s) Oral every 4 hours PRN Severe Pain (7 - 10)      ALLERGIES:  Allergies    latex (Rash)  penicillin (Rash)    Intolerances        LABS:                        8.9    10.56 )-----------( 200      ( 01 May 2025 05:30 )             28.2     05-01    141  |  105  |  13  ----------------------------<  114[H]  4.3   |  25  |  0.77    Ca    8.3[L]      01 May 2025 05:30        Urinalysis Basic - ( 01 May 2025 05:30 )    Color: x / Appearance: x / SG: x / pH: x  Gluc: 114 mg/dL / Ketone: x  / Bili: x / Urobili: x   Blood: x / Protein: x / Nitrite: x   Leuk Esterase: x / RBC: x / WBC x   Sq Epi: x / Non Sq Epi: x / Bacteria: x      CAPILLARY BLOOD GLUCOSE          RADIOLOGY & ADDITIONAL TESTS: Reviewed.

## 2025-05-01 NOTE — PROGRESS NOTE ADULT - ASSESSMENT
69F with PMH of HTN, HLD, obesity, OA of the hips and knees, presenting for elective L3-L4, L5-S1 lami, PSF L3-S1, TLIF L5-S1, L3-pelvis osteotomies.    #Post-operative state  - plan per orthopedic team  - passed TOV  - bowel regimen with senna and miralax  - pain control: robaxin 500mg every 8 hours, ocy 5 every 6 hours as needed for moderate pain, oxy 10mg every 4 hours for severe pain, dilaudid 0.5mg IV every 3 hours prn for breakthrough pain, tylenol 1000mg every 8 hours  - pregabalin 50mg three times a day  - encourage IS use  - had bowel movement yesterday    #HTN  #HLD  - continue atorvastatin 40mg daily   - continue spironolactone 50mg daily    #Leukocytosis  #Acute blood loss anemia - post-operative  #Isolated fever  - one temperature 100.6 - discussed with team, CXR, UA and RVP obtained - CXR with atelectasis  - leukocytosis likely reactive from recent surgery  - anemia related to recent surgery as well- continue to monitor  - transfuse for Hb < 7 - s/p 1U PRBCs with appropriate response  - maintain active type and screen and monitor for any signs/symptoms of bleeding    #Acute pulmonary embolism  - PESI 67- low risk Class 2 (gets points for age)  - no significant RV strain pattern on CTA, but evidence of pulmonary hypertension  - started on therapeutic lovenox  - troponin and BNP WNL  - dopplers negative  - pending TTE  - likely provoked event, given recent surgery - 3 month treatment  - recommend starting eliquis once safe from surgeon standpoint    50 minutes spent on this encounter, including face to face with patient, review of chart, care coordination and documentation.  Plan discussed with orthopedic team. 
69F with PMH of HTN, HLD, obesity, OA of the hips and knees, presenting for elective L3-L4, L5-S1 lami, PSF L3-S1, TLIF L5-S1, L3-pelvis osteotomies.    #Post-operative state  - plan per orthopedic team  - passed TOV  - bowel regimen with senna and miralax  - pain control: robaxin 500mg every 8 hours, ocy 5 every 6 hours as needed for moderate pain, oxy 10mg every 4 hours for severe pain, dilaudid 0.5mg IV every 3 hours prn for breakthrough pain, tylenol 1000mg every 8 hours  - pregabalin 50mg three times a day  - encourage IS use  - had bowel movement    #HTN  #HLD  - continue atorvastatin 40mg daily   - continue spironolactone 50mg daily    #Leukocytosis  #Acute blood loss anemia - post-operative  #Isolated fever  - one temperature 100.6 - discussed with team, CXR, UA and RVP obtained - CXR with atelectasis  - occasionally has O2 requirements, and mildly elevated temps (not clinical fever) - as well as tachycardia- recommend VTE evaluation prior to clearance for discharge with lower extremity dopplers and CTA  - leukocytosis likely reactive from recent surgery  - anemia related to recent surgery as well- continue to monitor  - transfuse for Hb < 7 - s/p 1U PRBCs with appropriate response  - maintain active type and screen and monitor for any signs/symptoms of bleeding    Moderate level of medical decision making required for this case, including the presence of two stable chronic medical issues and discussion of plan with the orthopedic team. 
69F with PMH of HTN, HLD, obesity, OA of the hips and knees, presenting for elective L3-L4, L5-S1 lami, PSF L3-S1, TLIF L5-S1, L3-pelvis osteotomies.    #Post-operative state  - plan per orthopedic team  - passed TOV  - bowel regimen with senna and miralax  - pain control: robaxin 500mg every 8 hours, ocy 5 every 6 hours as needed for moderate pain, oxy 10mg every 4 hours for severe pain, dilaudid 0.5mg IV every 3 hours prn for breakthrough pain, tylenol 1000mg every 8 hours  - pregabalin 50mg three times a day    #HTN  #HLD  - continue atorvastatin 40mg daily   - continue spironolactone 50mg daily    #Leukocytosis  #Acute blood loss anemia - post-operative  - leukocytosis likely reactive from recent surgery  - anemia related to recent surgery as well- continue to monitor  - transfuse for Hb < 7   - maintain active type and screen and monitor for any signs/symptoms of bleeding    35 minutes spent on this encounter, including face to face with patient, review of cahrt, care coordination and documentation.  Plan discussed with orthopedic team. 
69F with PMH of HTN, HLD, obesity, OA of the hips and knees, presenting for elective L3-L4, L5-S1 lami, PSF L3-S1, TLIF L5-S1, L3-pelvis osteotomies.    #Post-operative state  - plan per orthopedic team  - still has morales in place, pending PT and TOV when appropriate  - bowel regimen with senna and miralax  - pain control: robaxin 500mg every 8 hours, ocy 5 every 6 hours as needed for moderate pain, oxy 10mg every 4 hours for severe pain, dilaudid 0.5mg IV every 3 hours prn for breakthrough pain, tylenol 1000mg every 8 hours  - pregabalin 50mg three times a day    #HTN  #HLD  - continue atorvastatin 40mg daily   - continue spironolactone 50mg daily    #Leukocytosis  #Acute blood loss anemia - post-operative  - leukocytosis likely reactive from recent surgery  - anemia related to recent surgery as well- continue to monitor  - transfuse for Hb < 7   - maintain active type and screen and monitor for any signs/symptoms of bleeding  
69F with PMH of HTN, HLD, obesity, OA of the hips and knees, presenting for elective L3-L4, L5-S1 lami, PSF L3-S1, TLIF L5-S1, L3-pelvis osteotomies.    #Post-operative state  - plan per orthopedic team  - passed TOV  - bowel regimen with senna and miralax  - pain control: robaxin 500mg every 8 hours, ocy 5 every 6 hours as needed for moderate pain, oxy 10mg every 4 hours for severe pain, dilaudid 0.5mg IV every 3 hours prn for breakthrough pain, tylenol 1000mg every 8 hours  - pregabalin 50mg three times a day  - encourage IS use  - had bowel movement yesterday    #HTN  #HLD  - continue atorvastatin 40mg daily   - continue spironolactone 50mg daily    #Leukocytosis  #Acute blood loss anemia - post-operative  #Isolated fever  - one temperature 100.6 - discussed with team, CXR, UA and RVP obtained - CXR with atelectasis  - leukocytosis likely reactive from recent surgery  - anemia related to recent surgery as well- continue to monitor  - transfuse for Hb < 7 - s/p 1U PRBCs with appropriate response  - maintain active type and screen and monitor for any signs/symptoms of bleeding    #Acute pulmonary embolism  - PESI 67- low risk Class 2 (gets points for age)  - no significant RV strain pattern on CTA, but evidence of pulmonary hypertension  - started on therapeutic lovenox  - troponin and BNP WNL  - dopplers negative  - pending TTE  - likely provoked event, given recent surgery - 3 month treatment  - recommend starting eliquis once safe from surgeon standpoint    50 minutes spent on this encounter, including face to face with patient, review of chart, care coordination and documentation.  Plan discussed with orthopedic team. 
69F with PMH of HTN, HLD, obesity, OA of the hips and knees, presenting for elective L3-L4, L5-S1 lami, PSF L3-S1, TLIF L5-S1, L3-pelvis osteotomies.    #Post-operative state  - plan per orthopedic team  - still has morales in place, pending PT and TOV when appropriate  - bowel regimen with senna and miralax  - pain control: robaxin 500mg every 8 hours, ocy 5 every 6 hours as needed for moderate pain, oxy 10mg every 4 hours for severe pain, dilaudid 0.5mg IV every 3 hours prn for breakthrough pain, tylenol 1000mg every 8 hours - Deescalate opiates if possible   - pregabalin 50mg three times a day    #HTN  #HLD  - continue atorvastatin 40mg daily   - Hold spironolactone 50mg       #Leukocytosis  #Acute blood loss anemia - post-operative  - leukocytosis likely reactive from recent surgery  - anemia related to recent surgery as well- continue to monitor  - transfuse for Hb < 7   - maintain active type and screen and monitor for any signs/symptoms of bleeding

## 2025-05-01 NOTE — DIETITIAN INITIAL EVALUATION ADULT - ADD RECOMMEND
1. Continue current diet order  2. Encourage and monitor PO intake, honor preferences as able   >> Consistently meet >75% of estimated needs during admission   >> Consider oral nutrition supplement or liberalizing diet should intake decline </= 50%   3. Monitor wt trends, GI function, skin integrity  4. Monitor lytes, renal indices, blood glucose, LFTs    5. Pain and bowel regimen per team   RD will continue to monitor PO intake, labs, hydration, and wt prn.

## 2025-05-02 ENCOUNTER — NON-APPOINTMENT (OUTPATIENT)
Age: 70
End: 2025-05-02

## 2025-05-03 DIAGNOSIS — M46.1 SACROILIITIS, NOT ELSEWHERE CLASSIFIED: ICD-10-CM

## 2025-05-03 DIAGNOSIS — Z96.652 PRESENCE OF LEFT ARTIFICIAL KNEE JOINT: ICD-10-CM

## 2025-05-03 DIAGNOSIS — Z88.0 ALLERGY STATUS TO PENICILLIN: ICD-10-CM

## 2025-05-03 DIAGNOSIS — D72.829 ELEVATED WHITE BLOOD CELL COUNT, UNSPECIFIED: ICD-10-CM

## 2025-05-03 DIAGNOSIS — M43.16 SPONDYLOLISTHESIS, LUMBAR REGION: ICD-10-CM

## 2025-05-03 DIAGNOSIS — E78.5 HYPERLIPIDEMIA, UNSPECIFIED: ICD-10-CM

## 2025-05-03 DIAGNOSIS — D62 ACUTE POSTHEMORRHAGIC ANEMIA: ICD-10-CM

## 2025-05-03 DIAGNOSIS — Z79.82 LONG TERM (CURRENT) USE OF ASPIRIN: ICD-10-CM

## 2025-05-03 DIAGNOSIS — R50.9 FEVER, UNSPECIFIED: ICD-10-CM

## 2025-05-03 DIAGNOSIS — E66.9 OBESITY, UNSPECIFIED: ICD-10-CM

## 2025-05-03 DIAGNOSIS — I10 ESSENTIAL (PRIMARY) HYPERTENSION: ICD-10-CM

## 2025-05-03 DIAGNOSIS — Z91.040 LATEX ALLERGY STATUS: ICD-10-CM

## 2025-05-03 DIAGNOSIS — Z96.643 PRESENCE OF ARTIFICIAL HIP JOINT, BILATERAL: ICD-10-CM

## 2025-05-03 DIAGNOSIS — J45.909 UNSPECIFIED ASTHMA, UNCOMPLICATED: ICD-10-CM

## 2025-05-03 DIAGNOSIS — M51.16 INTERVERTEBRAL DISC DISORDERS WITH RADICULOPATHY, LUMBAR REGION: ICD-10-CM

## 2025-05-03 DIAGNOSIS — J98.11 ATELECTASIS: ICD-10-CM

## 2025-05-03 DIAGNOSIS — M40.36 FLATBACK SYNDROME, LUMBAR REGION: ICD-10-CM

## 2025-05-03 DIAGNOSIS — M48.062 SPINAL STENOSIS, LUMBAR REGION WITH NEUROGENIC CLAUDICATION: ICD-10-CM

## 2025-05-03 DIAGNOSIS — I26.99 OTHER PULMONARY EMBOLISM WITHOUT ACUTE COR PULMONALE: ICD-10-CM

## 2025-05-03 DIAGNOSIS — I27.20 PULMONARY HYPERTENSION, UNSPECIFIED: ICD-10-CM

## 2025-05-03 DIAGNOSIS — R00.0 TACHYCARDIA, UNSPECIFIED: ICD-10-CM

## 2025-05-09 RX ORDER — OXYCODONE 5 MG/1
5 TABLET ORAL
Qty: 42 | Refills: 0 | Status: ACTIVE | COMMUNITY
Start: 2025-05-09 | End: 1900-01-01

## 2025-05-09 RX ORDER — PREGABALIN 50 MG/1
50 CAPSULE ORAL
Qty: 60 | Refills: 0 | Status: ACTIVE | COMMUNITY
Start: 2025-05-09 | End: 1900-01-01

## 2025-05-09 RX ORDER — METHOCARBAMOL 500 MG/1
500 TABLET, FILM COATED ORAL EVERY 8 HOURS
Qty: 60 | Refills: 0 | Status: ACTIVE | COMMUNITY
Start: 2025-05-09 | End: 1900-01-01

## 2025-05-13 ENCOUNTER — APPOINTMENT (OUTPATIENT)
Dept: HOME HEALTH SERVICES | Facility: HOME HEALTH | Age: 70
End: 2025-05-13
Payer: MEDICARE

## 2025-05-13 DIAGNOSIS — M54.41 LUMBAGO WITH SCIATICA, RIGHT SIDE: ICD-10-CM

## 2025-05-13 DIAGNOSIS — H91.93 UNSPECIFIED HEARING LOSS, BILATERAL: ICD-10-CM

## 2025-05-13 DIAGNOSIS — G89.29 SACROCOCCYGEAL DISORDERS, NOT ELSEWHERE CLASSIFIED: ICD-10-CM

## 2025-05-13 DIAGNOSIS — I26.99 OTHER PULMONARY EMBOLISM W/OUT ACUTE COR PULMONALE: ICD-10-CM

## 2025-05-13 DIAGNOSIS — E78.2 MIXED HYPERLIPIDEMIA: ICD-10-CM

## 2025-05-13 DIAGNOSIS — E66.812 OBESITY, CLASS 2: ICD-10-CM

## 2025-05-13 DIAGNOSIS — M48.061 SPINAL STENOSIS, LUMBAR REGION WITHOUT NEUROGENIC CLAUDICATION: ICD-10-CM

## 2025-05-13 DIAGNOSIS — G89.29 LUMBAGO WITH SCIATICA, RIGHT SIDE: ICD-10-CM

## 2025-05-13 DIAGNOSIS — K21.9 GASTRO-ESOPHAGEAL REFLUX DISEASE W/OUT ESOPHAGITIS: ICD-10-CM

## 2025-05-13 DIAGNOSIS — M53.3 SACROCOCCYGEAL DISORDERS, NOT ELSEWHERE CLASSIFIED: ICD-10-CM

## 2025-05-13 DIAGNOSIS — I10 ESSENTIAL (PRIMARY) HYPERTENSION: ICD-10-CM

## 2025-05-13 DIAGNOSIS — G62.89 OTHER SPECIFIED POLYNEUROPATHIES: ICD-10-CM

## 2025-05-13 PROCEDURE — 99350 HOME/RES VST EST HIGH MDM 60: CPT

## 2025-05-13 RX ORDER — PREGABALIN 50 MG/1
50 CAPSULE ORAL 3 TIMES DAILY
Qty: 1 | Refills: 3 | Status: ACTIVE | COMMUNITY
Start: 2025-05-13

## 2025-05-13 RX ORDER — APIXABAN 5 MG/1
5 TABLET, FILM COATED ORAL
Qty: 180 | Refills: 3 | Status: ACTIVE | COMMUNITY
Start: 2025-05-13

## 2025-05-14 ENCOUNTER — APPOINTMENT (OUTPATIENT)
Dept: ORTHOPEDIC SURGERY | Facility: CLINIC | Age: 70
End: 2025-05-14

## 2025-05-14 PROCEDURE — 99024 POSTOP FOLLOW-UP VISIT: CPT

## 2025-05-14 PROCEDURE — 72100 X-RAY EXAM L-S SPINE 2/3 VWS: CPT

## 2025-05-23 DIAGNOSIS — Z98.1 ARTHRODESIS STATUS: ICD-10-CM

## 2025-05-29 ENCOUNTER — NON-APPOINTMENT (OUTPATIENT)
Age: 70
End: 2025-05-29

## 2025-06-09 RX ORDER — APIXABAN 5 MG/1
5 TABLET, FILM COATED ORAL TWICE DAILY
Qty: 60 | Refills: 0 | Status: ACTIVE | COMMUNITY
Start: 2025-06-09 | End: 1900-01-01

## 2025-06-11 ENCOUNTER — APPOINTMENT (OUTPATIENT)
Dept: ORTHOPEDIC SURGERY | Facility: CLINIC | Age: 70
End: 2025-06-11

## 2025-06-11 RX ORDER — OXYCODONE 5 MG/1
5 TABLET ORAL TWICE DAILY
Qty: 14 | Refills: 0 | Status: ACTIVE | COMMUNITY
Start: 2025-06-11 | End: 1900-01-01

## 2025-06-24 ENCOUNTER — APPOINTMENT (OUTPATIENT)
Dept: PULMONOLOGY | Facility: CLINIC | Age: 70
End: 2025-06-24
Payer: MEDICARE

## 2025-06-24 VITALS
SYSTOLIC BLOOD PRESSURE: 109 MMHG | DIASTOLIC BLOOD PRESSURE: 76 MMHG | TEMPERATURE: 206.96 F | OXYGEN SATURATION: 98 % | HEART RATE: 105 BPM | RESPIRATION RATE: 13 BRPM | BODY MASS INDEX: 39.78 KG/M2 | WEIGHT: 233 LBS | HEIGHT: 64 IN

## 2025-06-24 PROCEDURE — G2211 COMPLEX E/M VISIT ADD ON: CPT

## 2025-06-24 PROCEDURE — 99204 OFFICE O/P NEW MOD 45 MIN: CPT

## 2025-07-08 ENCOUNTER — NON-APPOINTMENT (OUTPATIENT)
Age: 70
End: 2025-07-08

## 2025-07-23 ENCOUNTER — APPOINTMENT (OUTPATIENT)
Dept: ORTHOPEDIC SURGERY | Facility: CLINIC | Age: 70
End: 2025-07-23

## 2025-07-29 DIAGNOSIS — Z98.1 ARTHRODESIS STATUS: ICD-10-CM

## 2025-08-11 RX ORDER — PREGABALIN 50 MG/1
50 CAPSULE ORAL TWICE DAILY
Qty: 28 | Refills: 0 | Status: DISCONTINUED | COMMUNITY
Start: 2025-07-29 | End: 2025-08-11

## 2025-08-12 ENCOUNTER — APPOINTMENT (OUTPATIENT)
Dept: HOME HEALTH SERVICES | Facility: HOME HEALTH | Age: 70
End: 2025-08-12
Payer: MEDICARE

## 2025-08-12 VITALS
HEART RATE: 84 BPM | RESPIRATION RATE: 18 BRPM | OXYGEN SATURATION: 97 % | DIASTOLIC BLOOD PRESSURE: 70 MMHG | SYSTOLIC BLOOD PRESSURE: 108 MMHG | TEMPERATURE: 206.6 F

## 2025-08-12 DIAGNOSIS — M53.3 SACROCOCCYGEAL DISORDERS, NOT ELSEWHERE CLASSIFIED: ICD-10-CM

## 2025-08-12 DIAGNOSIS — I26.99 OTHER PULMONARY EMBOLISM W/OUT ACUTE COR PULMONALE: ICD-10-CM

## 2025-08-12 DIAGNOSIS — M43.16 SPONDYLOLISTHESIS, LUMBAR REGION: ICD-10-CM

## 2025-08-12 DIAGNOSIS — M43.17 SPONDYLOLISTHESIS, LUMBOSACRAL REGION: ICD-10-CM

## 2025-08-12 DIAGNOSIS — G89.29 LUMBAGO WITH SCIATICA, RIGHT SIDE: ICD-10-CM

## 2025-08-12 DIAGNOSIS — M54.41 LUMBAGO WITH SCIATICA, RIGHT SIDE: ICD-10-CM

## 2025-08-12 DIAGNOSIS — M51.369: ICD-10-CM

## 2025-08-12 DIAGNOSIS — G89.29 SACROCOCCYGEAL DISORDERS, NOT ELSEWHERE CLASSIFIED: ICD-10-CM

## 2025-08-12 DIAGNOSIS — I10 ESSENTIAL (PRIMARY) HYPERTENSION: ICD-10-CM

## 2025-08-12 DIAGNOSIS — E78.2 MIXED HYPERLIPIDEMIA: ICD-10-CM

## 2025-08-12 DIAGNOSIS — E66.812 OBESITY, CLASS 2: ICD-10-CM

## 2025-08-12 DIAGNOSIS — M48.061 SPINAL STENOSIS, LUMBAR REGION WITHOUT NEUROGENIC CLAUDICATION: ICD-10-CM

## 2025-08-12 DIAGNOSIS — K21.9 GASTRO-ESOPHAGEAL REFLUX DISEASE W/OUT ESOPHAGITIS: ICD-10-CM

## 2025-08-12 PROCEDURE — 99349 HOME/RES VST EST MOD MDM 40: CPT

## 2025-08-23 ENCOUNTER — RX RENEWAL (OUTPATIENT)
Age: 70
End: 2025-08-23

## (undated) DEVICE — ROSA NAVITRACKER KIT KNEE/SPINE

## (undated) DEVICE — SUT PDS II 1 96" XLH

## (undated) DEVICE — POSITIONER JACKSON TABLE XODUS

## (undated) DEVICE — DRAPE LIGHT HANDLE COVER (BLUE)

## (undated) DEVICE — SYR LUER LOK 50CC

## (undated) DEVICE — SUCTION YANKAUER OPEN TIP NO VENT CURVE

## (undated) DEVICE — DRAPE INSTRUMENT POUCH 6.75" X 11"

## (undated) DEVICE — SPECIMEN CONTAINER 4OZ

## (undated) DEVICE — PREP CHLORAPREP HI-LITE ORANGE 26ML

## (undated) DEVICE — SUT VICRYL 0 36" CT-1 UNDYED

## (undated) DEVICE — GLV 7 PROTEXIS ORTHO (CREAM)

## (undated) DEVICE — TUBING XTRA AAL ASPIRATION AND ANTICOAGULATION LINE 1/4"

## (undated) DEVICE — POSITIONER FOAM EGG CRATE ULNAR 2PCS (PINK)

## (undated) DEVICE — BUR STRYKER MATCH HEAD NEURO SOFT TOUCH 3MM

## (undated) DEVICE — DRAIN JACKSON PRATT 3 SPRING RESERVOIR W 10FR PVC DRAIN

## (undated) DEVICE — WOUND IRR IRRISEPT W 0.5 CHG

## (undated) DEVICE — DRAPE STERI-DRAPE INCISE 32X33"

## (undated) DEVICE — SUT PROLENE 4-0 36" SH

## (undated) DEVICE — MIDAS REX MR8 BALL FLUTED LG BORE 5MM X 14CM

## (undated) DEVICE — BLADE SURGICAL #15 CARBON

## (undated) DEVICE — SUT ETHILON 3-0 18" PS-1

## (undated) DEVICE — NDL HYPO SAFE 22G X 1.5" (BLACK)

## (undated) DEVICE — SAW BLADE STRYKER SAGITTAL DUAL CUT TEETH 35X64X.64MM

## (undated) DEVICE — SUT PROLENE 6-0 30" BV

## (undated) DEVICE — SUT VICRYL 2-0 27" CT-1 UNDYED

## (undated) DEVICE — MIDAS REX MR8 MATCH HEAD FLUTED LG BORE 3MM X 14CM

## (undated) DEVICE — GLV 7.5 PROTEXIS (WHITE)

## (undated) DEVICE — SUT VICRYL 3-0 27" SH

## (undated) DEVICE — GLV 7 PROTEXIS (WHITE)

## (undated) DEVICE — SUT STRATAFIX SPIRAL PDO 0 24CM CP-2

## (undated) DEVICE — VENODYNE/SCD SLEEVE CALF MEDIUM

## (undated) DEVICE — ELCTR STRYKER NEPTUNE SMOKE EVACUATION PENCIL (GREEN)

## (undated) DEVICE — SUT STRATAFIX SPIRAL MONOCRYL PLUS 3-0 30CM PS-1 UNDYED

## (undated) DEVICE — DRAPE U POLY BLUE 60X72"

## (undated) DEVICE — SUT VICRYL 0 18" CT-2 (POP-OFF)

## (undated) DEVICE — SYR ASEPTO

## (undated) DEVICE — PREP DURAPREP 26CC

## (undated) DEVICE — DRAPE SURGICAL #1010

## (undated) DEVICE — DRSG BIOPATCH DISK W CHG 1" W 4.0MM HOLE

## (undated) DEVICE — SUT SILK 0 30" TIES

## (undated) DEVICE — BLADE SCALPEL SAFETYLOCK #15

## (undated) DEVICE — DRSG MASTISOL

## (undated) DEVICE — PREP BETADINE SPONGE STICKS

## (undated) DEVICE — LAP PAD 18 X 18"

## (undated) DEVICE — DRSG 2X2

## (undated) DEVICE — GLV 5.5 PROTEXIS (WHITE)

## (undated) DEVICE — WARMING BLANKET UPPER ADULT

## (undated) DEVICE — NDL 18G BLUNT FILL PINK

## (undated) DEVICE — POLISHER OR CAUTERY TIP

## (undated) DEVICE — SUCTION YANKAUER NO CONTROL VENT

## (undated) DEVICE — SUT VICRYL PLUS 2-0 18" CT-2 (POP-OFF)

## (undated) DEVICE — PACK TOTAL KNEE

## (undated) DEVICE — DRSG TELFA 3 X 8

## (undated) DEVICE — ELCTR BOVIE PENCIL HANDPIECE ROCKER SWITCH 15FT

## (undated) DEVICE — DRSG TEGADERM 4 X 4.75"

## (undated) DEVICE — NDL CHRONOS BMA 11GA

## (undated) DEVICE — SUT SILK 2-0 12-18"

## (undated) DEVICE — SUT STRATAFIX SPIRAL PDO 1 30CM OS-6

## (undated) DEVICE — DRSG COBAN 6"

## (undated) DEVICE — DRAPE 1/2 SHEET 40X57"

## (undated) DEVICE — DRAPE 3/4 SHEET 52X76"

## (undated) DEVICE — DRAPE GENERAL ENDOSCOPY

## (undated) DEVICE — SUT SILK 2-0 30" PSL

## (undated) DEVICE — STRYKER 4-PORT MANIFOLD W/SPECIMEN COLLECTION

## (undated) DEVICE — SUT MONOCRYL 3-0 27" PS-2 UNDYED

## (undated) DEVICE — TOURNIQUET ESMARK 6"

## (undated) DEVICE — DRAPE LIGHT HANDLE COVER (GREEN)

## (undated) DEVICE — SAW SAGITTAL 2108 SERIES 20.5X1.27X85MM

## (undated) DEVICE — ROSA DRAPE ROBOTIC UNIT

## (undated) DEVICE — NEURO SURGICAL STRIP 1/2 X 6"

## (undated) DEVICE — HOOD T5 PEELAWAY

## (undated) DEVICE — STRYKER NAVIGATION INSTRUMENT BATTERY

## (undated) DEVICE — SUT STRATAFIX SPIRAL PDS PLUS 0 23X23CM CP-2 VIOLET

## (undated) DEVICE — Device

## (undated) DEVICE — DRSG ACE BANDAGE 6"

## (undated) DEVICE — SUT VICRYL 2-0 27" CT-1

## (undated) DEVICE — DRSG DERMABOND PRINEO 22CM

## (undated) DEVICE — ELCTR AQUAMANTYS BIPOLAR SEALER 6.0

## (undated) DEVICE — DRSG TEGADERM 4X4.75"

## (undated) DEVICE — SUT PROLENE 5-0 36" RB-1

## (undated) DEVICE — ELCTR BOVIE PENCIL BLADE 10FT

## (undated) DEVICE — DRAPE MICROSCOPE LEICA MINI

## (undated) DEVICE — DRAPE TOWEL BLUE 17" X 24"

## (undated) DEVICE — GLV 7.5 PROTEXIS ORTHO (CREAM)

## (undated) DEVICE — SUT STRATAFIX SYMMETRIC PDS 1 45CM OS-6

## (undated) DEVICE — WOUND IRR SURGIPHOR

## (undated) DEVICE — DRSG GAUZE MOISTURIZER 0.5 OZ 4X8

## (undated) DEVICE — PACK SPINE

## (undated) DEVICE — FILTER LIP GRD SB 40/CA

## (undated) DEVICE — PREP BETADINE KIT

## (undated) DEVICE — NEURO SURGICAL STRIP 1/4 X 6"

## (undated) DEVICE — NDL SPINAL 18G X 3.5" (PINK)

## (undated) DEVICE — HOOD T7 PLUS PEELAWAY

## (undated) DEVICE — SUT PROLENE 4-0 18" PS-2

## (undated) DEVICE — SUT NYLON 3-0 18" PS-2

## (undated) DEVICE — MARKING PEN W RULER

## (undated) DEVICE — GLV 8 PROTEXIS (WHITE)

## (undated) DEVICE — DRSG STOCKINETTE IMPERVIOUS XL

## (undated) DEVICE — SAW BLADE STRYKER SAGITTAL 25X86.5X1.32MM

## (undated) DEVICE — SAW BLADE STRYKER SAGITTAL 81.5X12.5X1.19MM